# Patient Record
Sex: FEMALE | Race: WHITE | NOT HISPANIC OR LATINO | Employment: UNEMPLOYED | URBAN - NONMETROPOLITAN AREA
[De-identification: names, ages, dates, MRNs, and addresses within clinical notes are randomized per-mention and may not be internally consistent; named-entity substitution may affect disease eponyms.]

---

## 2017-01-04 ENCOUNTER — MYC MEDICAL ADVICE (OUTPATIENT)
Dept: FAMILY MEDICINE | Facility: CLINIC | Age: 47
End: 2017-01-04

## 2017-01-04 NOTE — Clinical Note
[facility]  [address] [facility] [address]  Phone: [phone]    January 10, 2017    Re: Noe Fitzgerald  [address] [address]              To Whom It May Concern:           I'm writing this letter on behalf of my patient, Noe Fitzgerald. For years she has suffered with chronic neck and back pain, headaches and deep grooves from her bra in to her shoulders. I recommend that she have breast reduction surgery done to alleviate her symptoms.       It is my pleasure to participate in Noe's health care needs. Please feel free to call if any questions or concerns.                Sincerely,            Abril Flores MD

## 2017-01-05 NOTE — TELEPHONE ENCOUNTER
Please advise   Thank you.      Notes from 11/23/16 Office Visit   Likely the phlegm in throat is from the lisinopril. Will stop and start losartan.      Omeprazole - Gastroesophageal reflux disease without esophagitis   Metoprolol - beta blocker - hypertension    Chlorthalidone - diuretic- Hypertension  Lisinopril - Ace inhibitor - Hypertension

## 2017-01-10 ENCOUNTER — TELEPHONE (OUTPATIENT)
Dept: FAMILY MEDICINE | Facility: CLINIC | Age: 47
End: 2017-01-10

## 2017-01-10 ENCOUNTER — ALLIED HEALTH/NURSE VISIT (OUTPATIENT)
Dept: FAMILY MEDICINE | Facility: CLINIC | Age: 47
End: 2017-01-10
Payer: COMMERCIAL

## 2017-01-10 ENCOUNTER — MEDICAL CORRESPONDENCE (OUTPATIENT)
Dept: HEALTH INFORMATION MANAGEMENT | Facility: CLINIC | Age: 47
End: 2017-01-10

## 2017-01-10 DIAGNOSIS — I77.810 ASCENDING AORTA DILATATION (H): Primary | ICD-10-CM

## 2017-01-10 PROCEDURE — 99207 ZZC NO CHARGE NURSE ONLY: CPT

## 2017-01-10 NOTE — PROGRESS NOTES
Patient came in to the clinic today for concerns about her medications.  Patient was confused about what she is suppose to stop and start.  Patient reports she has not started her Metoprolol.  Patient states she stopped the Lisinopril this summer and the tingling and swallowing concerns have not improved.  Patient reports the omeprazole did not improve the the swallowing concerns.  Patient stopped taking the Omeprazole. Patient stopped the chlorthalidone. Patient will start the Metoprolol.   Per notes below:    Notes from :     Abril Bello MD at 9/12/2016  4:43 PM      Status: Signed         Expand All Collapse All    BP Readings from Last 6 Encounters:    06/06/16  118/82    12/08/15  122/88    11/23/15  122/84    04/05/14  149/92    08/12/13  132/80    05/13/13  122/88        I discussed her echocardiogram with Dr Deshpande, recommend going on metoprolol, and recheck echocardiogram in one year.    Stop chlorthalidone  Start metoprolol    She is still having trouble swallowing and now it burns more, so she thinks it is heartburn.    Will have her start omeprazole and let me know           Will send to provider for review.    Thank you,  Valarie KAUR RN

## 2017-01-10 NOTE — TELEPHONE ENCOUNTER
Pt notified understood letter placed at  for pt to  per pt.  [facility]

## 2017-01-11 ENCOUNTER — OFFICE VISIT (OUTPATIENT)
Dept: FAMILY MEDICINE | Facility: CLINIC | Age: 47
End: 2017-01-11
Payer: COMMERCIAL

## 2017-01-11 VITALS
SYSTOLIC BLOOD PRESSURE: 128 MMHG | WEIGHT: 236.6 LBS | TEMPERATURE: 98.5 F | RESPIRATION RATE: 24 BRPM | HEIGHT: 66 IN | OXYGEN SATURATION: 97 % | BODY MASS INDEX: 38.02 KG/M2 | DIASTOLIC BLOOD PRESSURE: 72 MMHG | HEART RATE: 81 BPM

## 2017-01-11 DIAGNOSIS — I77.810 ASCENDING AORTA DILATATION (H): Primary | ICD-10-CM

## 2017-01-11 DIAGNOSIS — K21.9 GASTROESOPHAGEAL REFLUX DISEASE, ESOPHAGITIS PRESENCE NOT SPECIFIED: ICD-10-CM

## 2017-01-11 DIAGNOSIS — R13.10 DYSPHAGIA, UNSPECIFIED TYPE: ICD-10-CM

## 2017-01-11 DIAGNOSIS — I10 HYPERTENSION GOAL BP (BLOOD PRESSURE) < 140/90: ICD-10-CM

## 2017-01-11 PROCEDURE — 99214 OFFICE O/P EST MOD 30 MIN: CPT | Performed by: NURSE PRACTITIONER

## 2017-01-11 RX ORDER — METOPROLOL SUCCINATE 25 MG/1
25 TABLET, EXTENDED RELEASE ORAL DAILY
Qty: 30 TABLET | Refills: 3 | Status: SHIPPED | OUTPATIENT
Start: 2017-01-11 | End: 2017-02-17

## 2017-01-11 RX ORDER — LISINOPRIL 10 MG/1
10 TABLET ORAL DAILY
Qty: 90 TABLET | Refills: 3 | Status: SHIPPED | OUTPATIENT
Start: 2017-01-11 | End: 2017-02-17

## 2017-01-11 NOTE — MR AVS SNAPSHOT
After Visit Summary   1/11/2017    Noe Fitzgerald    MRN: [id number]           Patient Information     Date Of Birth          1970        Visit Information        Provider Department      1/11/2017 2:00 PM Samantha Cruz APRN [facility]        Today's Diagnoses     Ascending aorta dilatation (H)    -  1     Hypertension goal BP (blood pressure) < 140/90         Dysphagia, unspecified type         Gastroesophageal reflux disease, esophagitis presence not specified           Care Instructions    Schedule your endoscopy, phone number is below.    As the Prilosec is no longer effective, you could try Protonix 40 mg a day for a month then reduce to 20 mg a day. It's available over the counter.      Tips to Control Acid Reflux  To control acid reflux, you ll need to make some basic diet and lifestyle changes. The simple steps outlined below may be all you ll need to relieve discomfort.  Watch What You Eat      Avoid fatty foods and spicy foods.    Eat fewer acidic foods, such as citrus and tomato-based foods. These can increase symptoms.    Limit drinking alcohol, caffeine, and fizzy beverages. All increase acid reflux.    Try limiting chocolate, peppermint, and spearmint. These can worsen acid reflux in some people.  Watch When You Eat    Avoid lying down for 3 hours after eating.    Do not snack before going to bed.  Raise Your Head    Raising your head and upper body by 4 inches to 6 inches helps limit reflux when you re lying down. Put blocks under the head of the bed frame to raise it.  Other Changes    Lose weight, if you need to.    Don t work out near bedtime.    Avoid tight-fitting clothes.    Limit aspirin and ibuprofen.    Stop smoking.     2859-0559 [employer]. [address] [place] [address], [place] [address]. All rights reserved. This information is not intended as a substitute for professional medical care. Always follow your healthcare professional's  instructions.        What Is Acid Reflux?    Do you have to clear your throat or cough often? Are you hoarse? Do you have difficulty swallowing? If you have these or other throat symptoms, you may have acid reflux (when stomach acid washes up and irritates your throat).  Why You Have Throat Symptoms  At both ends of the esophagus (the tube that carries food to the stomach) are the esophageal sphincters. These muscles relax to let food pass, then tighten to keep stomach acid down. When the lower esophageal sphincter (LES) doesn t tighten enough, acid can reflux from the stomach into the esophagus. This may cause heartburn. If the upper esophageal sphincter (UES) also doesn t work well, acid can travel higher and enter your throat (pharynx). In many cases, this causes throat symptoms.  Common Throat Symptoms    Frequent need to clear your throat    Feeling like you re choking    Chronic cough    Hoarseness    Trouble swallowing    Sensation of having  a lump in the throat     Sour or acid taste    Recurrent sore throat     3960-6806 [employer]. [address] [place] [address]. All rights reserved. This information is not intended as a substitute for professional medical care. Always follow your healthcare professional's instructions.              Follow-ups after your visit        Additional Services     GASTROENTEROLOGY ADULT REFERRAL +/- PROCEDURE       Coverage of these services is subject to the terms and limitations of your health insurance plan.  Please call  member services at your health plan with any benefit or coverage questions.  Any procedures must be performed at a [facility] facility OR coordinated by your clinic's referral office.    PROCEDURE ONLY - UPPER GI ENDOSCOPY (EGD) - Reason for procedure: Chronic GERD  FMG: [facility] [place] [phone]   [email or web address]  0                  Your next 10 appointments already scheduled     [place]  23, 2017 11:00 AM   [facility] Mamm[facility] with [facility]   [facility]    [address] [address] [phone]           Do NOT use body powder, lotions, perfume or deodorant the day of the exam.      If your last mammogram was not done at [place], please bring your mammogram films. We will need the name of your provider to send a copy of your report.        A mammogram may be covered on an annual or biannual basis, please check with your insurance company.             Jan 30, 2017 11:00 AM   SHORT with Abril Bello MD   [facility]    [address] [address] [phone]              Who to contact     If you have questions or need follow up information about today's clinic visit or your schedule please contact [facility] directly at [phone].  Normal or non-critical lab and imaging results will be communicated to you by Connectbeamhart, letter or phone within 4 business days after the clinic has received the results. If you do not hear from us within 7 days, please contact the clinic through HidInImage or phone. If you have a critical or abnormal lab result, we will notify you by phone as soon as possible.  Submit refill requests through HidInImage or call your pharmacy and they will forward the refill request to us. Please allow 3 business days for your refill to be completed.          Additional Information About Your Visit        HidInImage Information     HidInImage gives you secure access to your electronic health record. If you see a primary care provider, you can also send messages to your care team and make appointments. If you have questions, please call your primary care clinic.  If you do not have a primary care provider, please call [phone] and they will assist you.        Care EveryWhere ID     This is your Care EveryWhere ID. This could be used by other organizations to  "access your [facility] medical records  [id number]        Your Vitals Were     Pulse Temperature Respirations    81 98.5  F (36.9  C) (Tympanic) 24    Height BMI (Body Mass Index) Pulse Oximetry    5' 5.75\" (1.67 m) 38.48 kg/m2 97%    Last Period Breastfeeding?       12/07/2016 (Approximate) No        Blood Pressure from Last 3 Encounters:   01/11/17 128/72   12/27/16 136/94   06/06/16 118/82    Weight from Last 3 Encounters:   01/11/17 236 lb 9.6 oz (107.321 kg)   12/27/16 240 lb 1.6 oz (108.909 kg)   06/06/16 233 lb (105.688 kg)              We Performed the Following     GASTROENTEROLOGY ADULT REFERRAL +/- PROCEDURE          Today's Medication Changes          These changes are accurate as of: 1/11/17  2:43 PM.  If you have any questions, ask your nurse or doctor.               Stop taking these medicines if you haven't already. Please contact your care team if you have questions.     chlorthalidone 25 MG tablet   Commonly known as:  HYGROTON                Where to get your medicines      These medications were sent to [facility] [address] [place] [address] - [address]  [address] [facility] [address]     Phone:  [phone]    - lisinopril 10 MG tablet  - metoprolol 25 MG 24 hr tablet             Primary Care Provider Office Phone # Fax #    Abril Bello [phone]       [facility] [address] [facility] [address]        Thank you!     Thank you for choosing [facility]  for your care. Our goal is always to provide you with excellent care. Hearing back from our patients is one way we can continue to improve our services. Please take a few minutes to complete the written survey that you may receive in the mail after your visit with us. Thank you!             Your Updated Medication List - Protect others around you: Learn how to safely use, store and throw away your medicines at www.disposemymeds.org.          This list is accurate as of: 1/11/17  " 2:43 PM.  Always use your most recent med list.                   Brand Name Dispense Instructions for use    IBU-200 PO      Take 2 tablets by mouth as needed.       lisinopril 10 MG tablet    PRINIVIL/ZESTRIL    90 tablet    Take 1 tablet (10 mg) by mouth daily       metoprolol 25 MG 24 hr tablet    TOPROL-XL    30 tablet    Take 1 tablet (25 mg) by mouth daily       omeprazole 20 MG CR capsule    priLOSEC    30 capsule    Take 1 capsule (20 mg) by mouth daily

## 2017-01-11 NOTE — PATIENT INSTRUCTIONS
Schedule your endoscopy, phone number is below.    As the Prilosec is no longer effective, you could try Protonix 40 mg a day for a month then reduce to 20 mg a day. It's available over the counter.      Tips to Control Acid Reflux  To control acid reflux, you ll need to make some basic diet and lifestyle changes. The simple steps outlined below may be all you ll need to relieve discomfort.  Watch What You Eat      Avoid fatty foods and spicy foods.    Eat fewer acidic foods, such as citrus and tomato-based foods. These can increase symptoms.    Limit drinking alcohol, caffeine, and fizzy beverages. All increase acid reflux.    Try limiting chocolate, peppermint, and spearmint. These can worsen acid reflux in some people.  Watch When You Eat    Avoid lying down for 3 hours after eating.    Do not snack before going to bed.  Raise Your Head    Raising your head and upper body by 4 inches to 6 inches helps limit reflux when you re lying down. Put blocks under the head of the bed frame to raise it.  Other Changes    Lose weight, if you need to.    Don t work out near bedtime.    Avoid tight-fitting clothes.    Limit aspirin and ibuprofen.    Stop smoking.     8815-8732 [employer]. [address] [place] [address], [place] [address]. All rights reserved. This information is not intended as a substitute for professional medical care. Always follow your healthcare professional's instructions.        What Is Acid Reflux?    Do you have to clear your throat or cough often? Are you hoarse? Do you have difficulty swallowing? If you have these or other throat symptoms, you may have acid reflux (when stomach acid washes up and irritates your throat).  Why You Have Throat Symptoms  At both ends of the esophagus (the tube that carries food to the stomach) are the esophageal sphincters. These muscles relax to let food pass, then tighten to keep stomach acid down. When the lower esophageal sphincter (LES) doesn t tighten  enough, acid can reflux from the stomach into the esophagus. This may cause heartburn. If the upper esophageal sphincter (UES) also doesn t work well, acid can travel higher and enter your throat (pharynx). In many cases, this causes throat symptoms.  Common Throat Symptoms    Frequent need to clear your throat    Feeling like you re choking    Chronic cough    Hoarseness    Trouble swallowing    Sensation of having  a lump in the throat     Sour or acid taste    Recurrent sore throat     9528-7207 [employer]. [address] [place] [address], [place] [address]. All rights reserved. This information is not intended as a substitute for professional medical care. Always follow your healthcare professional's instructions.

## 2017-01-11 NOTE — PROGRESS NOTES
SUBJECTIVE:                                                    Noe Fitzgerald is a 47 year old female who presents to clinic today for the following health issues:  GERD/Heartburn      Duration: 1 week worse, started in June    Description (location/character/radiation): heartburn, acid reflux and food getting stuck    Intensity:  severe    Accompanying signs and symptoms:  food getting stuck: YES  THROAT clearing   nausea/vomiting/blood: YES- vomiting  abdominal pain: no   black/tarry or bloody stools: no :    History (similar episodes/previous evaluation): tried going off lisinopril, not helping, otc prilosec helped for a little bit but quit working    Precipitating or alleviating factors:  worse with alcohol.  current NSAID/Aspirin use: YES- occasionally naproxyn    Therapies tried and outcome: Omeprazole (Prilosec) not helpful   Feels like Food is caught at back of throat. heartburn since June bur symptoms worsened for  about a week, .  No N&V during the day.  Prilosec helped in the past but no more. Normal diet, chews well. Some softer foods. .     Hypertension Follow-up      Outpatient blood pressures are not being checked.    Low Salt Diet: no added salt     BP Readings from Last 6 Encounters:   01/26/17 118/87   01/11/17 128/72   12/27/16 136/94   06/06/16 118/82   12/08/15 122/88   11/23/15 122/84         Amount of exercise or physical activity: 2-3 days/week for an average of 15-30 minutes    Problems taking medications regularly: No    Medication side effects: none    Diet: low salt    Problem list and histories reviewed & adjusted, as indicated.  Additional history: as documented    Labs reviewed in EPIC  Problem list, Medication list, Allergies, and Medical/Social/Surgical histories reviewed in [facility] and updated as appropriate.    ROS:  Constitutional, HEENT, cardiovascular, pulmonary, gi and gu systems are negative, except as otherwise noted.    OBJECTIVE:                                            "         /72 mmHg  Pulse 81  Temp(Src) 98.5  F (36.9  C) (Tympanic)  Resp 24  Ht 5' 5.75\" (1.67 m)  Wt 236 lb 9.6 oz (107.321 kg)  BMI 38.48 kg/m2  SpO2 97%  LMP 01/10/2017  Breastfeeding? No  Body mass index is 38.48 kg/(m^2).  GENERAL: healthy, alert and no distress  RESP: lungs clear to auscultation - no rales, rhonchi or wheezes  CV: regular rate and rhythm, normal S1 S2, no S3 or S4, no murmur, click or rub, no peripheral edema and peripheral pulses strong    Diagnostic Test Results:  none     ASSESSMENT/PLAN:                                                        1. Hypertension goal BP (blood pressure) < 140/90  Well controlled. Needs med refills. No change in plan of care   - lisinopril (PRINIVIL/ZESTRIL) 10 MG tablet; Take 1 tablet (10 mg) by mouth daily  Dispense: 90 tablet; Refill: 3  - metoprolol (TOPROL-XL) 25 MG 24 hr tablet; Take 1 tablet (25 mg) by mouth daily  Dispense: 30 tablet; Refill: 3    2. Dysphagia, unspecified type  Symptoms have worsened over the past week. Will refer for UGI  - GASTROENTEROLOGY ADULT REFERRAL +/- PROCEDURE    3. Gastroesophageal reflux disease, esophagitis presence not specified  Continue PPI. Consider changing to protonix. Further plan pending UGI      Patient Instructions     Schedule your endoscopy, phone number is below.    As the Prilosec is no longer effective, you could try Protonix 40 mg a day for a month then reduce to 20 mg a day. It's available over the counter.      Tips to Control Acid Reflux  To control acid reflux, you ll need to make some basic diet and lifestyle changes. The simple steps outlined below may be all you ll need to relieve discomfort.  Watch What You Eat      Avoid fatty foods and spicy foods.    Eat fewer acidic foods, such as citrus and tomato-based foods. These can increase symptoms.    Limit drinking alcohol, caffeine, and fizzy beverages. All increase acid reflux.    Try limiting chocolate, peppermint, and spearmint. These " can worsen acid reflux in some people.  Watch When You Eat    Avoid lying down for 3 hours after eating.    Do not snack before going to bed.  Raise Your Head    Raising your head and upper body by 4 inches to 6 inches helps limit reflux when you re lying down. Put blocks under the head of the bed frame to raise it.  Other Changes    Lose weight, if you need to.    Don t work out near bedtime.    Avoid tight-fitting clothes.    Limit aspirin and ibuprofen.    Stop smoking.     0789-2737 [employer]. [address] [place] [address]. All rights reserved. This information is not intended as a substitute for professional medical care. Always follow your healthcare professional's instructions.        What Is Acid Reflux?    Do you have to clear your throat or cough often? Are you hoarse? Do you have difficulty swallowing? If you have these or other throat symptoms, you may have acid reflux (when stomach acid washes up and irritates your throat).  Why You Have Throat Symptoms  At both ends of the esophagus (the tube that carries food to the stomach) are the esophageal sphincters. These muscles relax to let food pass, then tighten to keep stomach acid down. When the lower esophageal sphincter (LES) doesn t tighten enough, acid can reflux from the stomach into the esophagus. This may cause heartburn. If the upper esophageal sphincter (UES) also doesn t work well, acid can travel higher and enter your throat (pharynx). In many cases, this causes throat symptoms.  Common Throat Symptoms    Frequent need to clear your throat    Feeling like you re choking    Chronic cough    Hoarseness    Trouble swallowing    Sensation of having  a lump in the throat     Sour or acid taste    Recurrent sore throat     4571-8036 [employer]. [address] [place] [address]. All rights reserved. This information is not intended as a substitute for professional medical care. Always follow your  healthcare professional's instructions.              Samantha Cruz, SILKE, APRN [facility]

## 2017-01-11 NOTE — NURSING NOTE
"Chief Complaint   Patient presents with     Gastrophageal Reflux       Initial /72 mmHg  Pulse 81  Temp(Src) 98.5  F (36.9  C) (Tympanic)  Resp 24  Ht 5' 5.75\" (1.67 m)  Wt 236 lb 9.6 oz (107.321 kg)  BMI 38.48 kg/m2  SpO2 97%  LMP 12/07/2016 (Approximate)  Breastfeeding? No Estimated body mass index is 38.48 kg/(m^2) as calculated from the following:    Height as of this encounter: 5' 5.75\" (1.67 m).    Weight as of this encounter: 236 lb 9.6 oz (107.321 kg).  BP completed using cuff size: large  "

## 2017-01-23 DIAGNOSIS — Z12.31 VISIT FOR SCREENING MAMMOGRAM: ICD-10-CM

## 2017-01-23 PROCEDURE — G0202 SCR MAMMO BI INCL CAD: HCPCS | Mod: TC

## 2017-01-25 ENCOUNTER — ANESTHESIA EVENT (OUTPATIENT)
Dept: GASTROENTEROLOGY | Facility: CLINIC | Age: 47
End: 2017-01-25
Payer: COMMERCIAL

## 2017-01-25 NOTE — ANESTHESIA PREPROCEDURE EVALUATION
Anesthesia Evaluation     . Pt has had prior anesthetic. Type: General and MAC      ROS/MED HX    ENT/Pulmonary:  - neg pulmonary ROS     Neurologic:  - neg neurologic ROS     Cardiovascular:     (+) Dyslipidemia, hypertension----. : . . . :. .       METS/Exercise Tolerance:     Hematologic:  - neg hematologic  ROS       Musculoskeletal:  - neg musculoskeletal ROS       GI/Hepatic:     (+) GERD Symptomatic,       Renal/Genitourinary:  - ROS Renal section negative       Endo:     (+) Obesity, .      Psychiatric:  - neg psychiatric ROS       Infectious Disease:  - neg infectious disease ROS       Malignancy:      - no malignancy   Other:               Physical Exam  Normal systems: cardiovascular, pulmonary and dental    Airway   Mallampati: II  TM distance: >3 FB  Neck ROM: full    Dental     Cardiovascular       Pulmonary     Other findings: Dilated Ascending Aorta                Anesthesia Plan      History & Physical Review  History and physical reviewed and following examination; no interval change.    ASA Status:  2 .    NPO Status:  > 6 hours    Plan for MAC          Postoperative Care      Consents  Anesthetic plan, risks, benefits and alternatives discussed with:  Patient..                          .

## 2017-01-26 ENCOUNTER — ANESTHESIA (OUTPATIENT)
Dept: GASTROENTEROLOGY | Facility: CLINIC | Age: 47
End: 2017-01-26
Payer: COMMERCIAL

## 2017-01-26 ENCOUNTER — HOSPITAL ENCOUNTER (OUTPATIENT)
Facility: CLINIC | Age: 47
Discharge: HOME | End: 2017-01-26
Attending: SURGERY | Admitting: SURGERY
Payer: COMMERCIAL

## 2017-01-26 ENCOUNTER — SURGERY (OUTPATIENT)
Age: 47
End: 2017-01-26

## 2017-01-26 VITALS
OXYGEN SATURATION: 97 % | SYSTOLIC BLOOD PRESSURE: 118 MMHG | DIASTOLIC BLOOD PRESSURE: 87 MMHG | HEART RATE: 76 BPM | TEMPERATURE: 97.1 F | RESPIRATION RATE: 20 BRPM

## 2017-01-26 LAB
HCG UR QL: NEGATIVE
UPPER GI ENDOSCOPY: NORMAL

## 2017-01-26 PROCEDURE — 43235 EGD DIAGNOSTIC BRUSH WASH: CPT | Performed by: SURGERY

## 2017-01-26 PROCEDURE — 37000008 ZZH ANESTHESIA TECHNICAL FEE, 1ST 30 MIN: Performed by: SURGERY

## 2017-01-26 PROCEDURE — 25800025 ZZH RX 258: Performed by: SURGERY

## 2017-01-26 PROCEDURE — 25000125 ZZHC RX 250: Performed by: SURGERY

## 2017-01-26 PROCEDURE — 25000125 ZZHC RX 250: Performed by: NURSE ANESTHETIST, CERTIFIED REGISTERED

## 2017-01-26 PROCEDURE — 25000132 ZZH RX MED GY IP 250 OP 250 PS 637: Performed by: SURGERY

## 2017-01-26 PROCEDURE — 81025 URINE PREGNANCY TEST: CPT | Performed by: SURGERY

## 2017-01-26 RX ORDER — SODIUM CHLORIDE, SODIUM LACTATE, POTASSIUM CHLORIDE, CALCIUM CHLORIDE 600; 310; 30; 20 MG/100ML; MG/100ML; MG/100ML; MG/100ML
INJECTION, SOLUTION INTRAVENOUS CONTINUOUS
Status: DISCONTINUED | OUTPATIENT
Start: 2017-01-26 | End: 2017-01-26 | Stop reason: HOSPADM

## 2017-01-26 RX ORDER — PROPOFOL 10 MG/ML
INJECTION, EMULSION INTRAVENOUS PRN
Status: DISCONTINUED | OUTPATIENT
Start: 2017-01-26 | End: 2017-01-26

## 2017-01-26 RX ORDER — CEFAZOLIN SODIUM 2 G/100ML
2 INJECTION, SOLUTION INTRAVENOUS
Status: DISCONTINUED | OUTPATIENT
Start: 2017-01-26 | End: 2017-01-26 | Stop reason: HOSPADM

## 2017-01-26 RX ORDER — LIDOCAINE HYDROCHLORIDE 10 MG/ML
INJECTION, SOLUTION INFILTRATION; PERINEURAL PRN
Status: DISCONTINUED | OUTPATIENT
Start: 2017-01-26 | End: 2017-01-26

## 2017-01-26 RX ORDER — GLYCOPYRROLATE 0.2 MG/ML
INJECTION, SOLUTION INTRAMUSCULAR; INTRAVENOUS PRN
Status: DISCONTINUED | OUTPATIENT
Start: 2017-01-26 | End: 2017-01-26

## 2017-01-26 RX ORDER — SODIUM CHLORIDE, SODIUM LACTATE, POTASSIUM CHLORIDE, CALCIUM CHLORIDE 600; 310; 30; 20 MG/100ML; MG/100ML; MG/100ML; MG/100ML
1000 INJECTION, SOLUTION INTRAVENOUS CONTINUOUS
Status: DISCONTINUED | OUTPATIENT
Start: 2017-01-26 | End: 2017-01-26 | Stop reason: HOSPADM

## 2017-01-26 RX ORDER — LIDOCAINE 40 MG/G
CREAM TOPICAL
Status: DISCONTINUED | OUTPATIENT
Start: 2017-01-26 | End: 2017-01-26 | Stop reason: HOSPADM

## 2017-01-26 RX ORDER — ONDANSETRON 2 MG/ML
4 INJECTION INTRAMUSCULAR; INTRAVENOUS
Status: DISCONTINUED | OUTPATIENT
Start: 2017-01-26 | End: 2017-01-26 | Stop reason: HOSPADM

## 2017-01-26 RX ORDER — SIMETHICONE 40MG/0.6ML
SUSPENSION, DROPS(FINAL DOSAGE FORM)(ML) ORAL PRN
Status: DISCONTINUED | OUTPATIENT
Start: 2017-01-26 | End: 2017-01-26 | Stop reason: HOSPADM

## 2017-01-26 RX ORDER — PROPOFOL 10 MG/ML
INJECTION, EMULSION INTRAVENOUS CONTINUOUS PRN
Status: DISCONTINUED | OUTPATIENT
Start: 2017-01-26 | End: 2017-01-26

## 2017-01-26 RX ADMIN — PROPOFOL 200 MG: 10 INJECTION, EMULSION INTRAVENOUS at 12:48

## 2017-01-26 RX ADMIN — LIDOCAINE HYDROCHLORIDE 50 MG: 10 INJECTION, SOLUTION INFILTRATION; PERINEURAL at 12:48

## 2017-01-26 RX ADMIN — PROPOFOL 200 MCG/KG/MIN: 10 INJECTION, EMULSION INTRAVENOUS at 12:48

## 2017-01-26 RX ADMIN — BENZOCAINE 1 SPRAY: 220 SPRAY, METERED PERIODONTAL at 12:47

## 2017-01-26 RX ADMIN — SODIUM CHLORIDE, POTASSIUM CHLORIDE, SODIUM LACTATE AND CALCIUM CHLORIDE: 600; 310; 30; 20 INJECTION, SOLUTION INTRAVENOUS at 12:23

## 2017-01-26 RX ADMIN — LIDOCAINE HYDROCHLORIDE 1 ML: 10 INJECTION, SOLUTION INFILTRATION; PERINEURAL at 12:24

## 2017-01-26 RX ADMIN — GLYCOPYRROLATE 0.2 MG: 0.2 INJECTION, SOLUTION INTRAMUSCULAR; INTRAVENOUS at 12:46

## 2017-01-26 RX ADMIN — SIMETHICONE 40 MG: 20 SUSPENSION/ DROPS ORAL at 12:47

## 2017-01-26 NOTE — ANESTHESIA CARE TRANSFER NOTE
Patient: Noe Fitzgerald    COMBINED ESOPHAGOSCOPY, GASTROSCOPY, DUODENOSCOPY (EGD) (N/A Esophagus)  Additional InformationProcedure(s):  Gastroscopy - Wound Class: II-Clean Contaminated    Diagnosis: dysphagia  Diagnosis Additional Information: No value filed.    Anesthesia Type:   MAC     Note:  Airway :Room Air  Patient transferred to:Phase II        Vitals: (Last set prior to Anesthesia Care Transfer)              Electronically Signed By: GEETHA Victoria CRNA  January 26, 2017  1:02 PM

## 2017-01-26 NOTE — ANESTHESIA POSTPROCEDURE EVALUATION
Patient: Noe Fitzgerald    COMBINED ESOPHAGOSCOPY, GASTROSCOPY, DUODENOSCOPY (EGD) (N/A Esophagus)  Additional InformationProcedure(s):  Gastroscopy - Wound Class: II-Clean Contaminated    Diagnosis:dysphagia  Diagnosis Additional Information: No value filed.    Anesthesia Type:  MAC    Note:  Anesthesia Post Evaluation    Patient location during evaluation: Phase 2  Patient participation: Able to fully participate in evaluation  Level of consciousness: awake  Pain management: adequate  Airway patency: patent  Cardiovascular status: acceptable  Respiratory status: acceptable  Hydration status: acceptable  PONV: none     Anesthetic complications: None          Last vitals:  Filed Vitals:    01/26/17 1203   BP: 184/110   Temp: 36  C (96.8  F)   Resp: 20       Electronically Signed By: GEETHA Victoria CRNA  January 26, 2017  1:02 PM

## 2017-01-26 NOTE — H&P
47 year old year old female here for upper endoscopy for dysphagia        Patient Active Problem List   Diagnosis     Large breasts     CARDIOVASCULAR SCREENING; LDL GOAL LESS THAN 160     Hypertension goal BP (blood pressure) < 140/90     ASCUS with positive high risk HPV     Ascending aorta dilatation (H)       Past Medical History   Diagnosis Date     Vaginitis and vulvovaginitis, unspecified      Supervision of normal first pregnancy      Gynecological examination      S/P LEEP of cervix 1991     has been >20 yrs since LEEP, no abnls noted, paps moved to Q3 yrs     ASCUS with positive high risk HPV 11/2015       Past Surgical History   Procedure Laterality Date     Surgical history of -        Ear surgery     Surgical history of -        Foot surgery     Leep tx, cervical  1991     LEEP TX Cervical       Family History   Problem Relation Age of Onset     Hypertension Father        No current outpatient prescriptions on file.    Allergies   Allergen Reactions     Bactrim [Sulfamethoxazole W/Trimethoprim]        Pt reports that she has never smoked. She has never used smokeless tobacco. She reports that she does not drink alcohol or use illicit drugs.    Exam:    Awake, Alert OX3  Lungs - CTA bilaterally  CV - RRR, no murmurs, distal pulses intact  Abd - soft, non-distended, non-tender, +BS  Extr - No cyanosis or edema    A/P 47 year old year old female in need of upper endoscopy for dysphagia. Risks, benefits, alternatives, and complications were discussed including the possibility of perforation and the patient agreed to proceed.    Damian Ames MD

## 2017-02-17 ENCOUNTER — OFFICE VISIT (OUTPATIENT)
Dept: FAMILY MEDICINE | Facility: CLINIC | Age: 47
End: 2017-02-17
Payer: COMMERCIAL

## 2017-02-17 VITALS
BODY MASS INDEX: 37.93 KG/M2 | OXYGEN SATURATION: 98 % | SYSTOLIC BLOOD PRESSURE: 122 MMHG | HEART RATE: 102 BPM | DIASTOLIC BLOOD PRESSURE: 84 MMHG | WEIGHT: 236 LBS | TEMPERATURE: 99.3 F | HEIGHT: 66 IN

## 2017-02-17 DIAGNOSIS — Z00.00 ROUTINE GENERAL MEDICAL EXAMINATION AT A HEALTH CARE FACILITY: Primary | ICD-10-CM

## 2017-02-17 DIAGNOSIS — K21.9 GASTROESOPHAGEAL REFLUX DISEASE WITHOUT ESOPHAGITIS: ICD-10-CM

## 2017-02-17 DIAGNOSIS — I77.810 ASCENDING AORTA DILATATION (H): ICD-10-CM

## 2017-02-17 DIAGNOSIS — R87.610 PAPANICOLAOU SMEAR OF CERVIX WITH ATYPICAL SQUAMOUS CELLS OF UNDETERMINED SIGNIFICANCE (ASC-US): ICD-10-CM

## 2017-02-17 DIAGNOSIS — R21 RASH AND NONSPECIFIC SKIN ERUPTION: ICD-10-CM

## 2017-02-17 DIAGNOSIS — N62 LARGE BREASTS: ICD-10-CM

## 2017-02-17 DIAGNOSIS — I10 HYPERTENSION GOAL BP (BLOOD PRESSURE) < 140/90: ICD-10-CM

## 2017-02-17 PROCEDURE — 99396 PREV VISIT EST AGE 40-64: CPT | Performed by: FAMILY MEDICINE

## 2017-02-17 PROCEDURE — 99212 OFFICE O/P EST SF 10 MIN: CPT | Mod: 25 | Performed by: FAMILY MEDICINE

## 2017-02-17 PROCEDURE — G0145 SCR C/V CYTO,THINLAYER,RESCR: HCPCS | Performed by: FAMILY MEDICINE

## 2017-02-17 PROCEDURE — 87624 HPV HI-RISK TYP POOLED RSLT: CPT | Performed by: FAMILY MEDICINE

## 2017-02-17 PROCEDURE — 36415 COLL VENOUS BLD VENIPUNCTURE: CPT | Performed by: FAMILY MEDICINE

## 2017-02-17 PROCEDURE — 80053 COMPREHEN METABOLIC PANEL: CPT | Performed by: FAMILY MEDICINE

## 2017-02-17 RX ORDER — METOPROLOL SUCCINATE 25 MG/1
25 TABLET, EXTENDED RELEASE ORAL DAILY
Qty: 90 TABLET | Refills: 3 | Status: SHIPPED | OUTPATIENT
Start: 2017-02-17 | End: 2018-02-02

## 2017-02-17 RX ORDER — NYSTATIN 100000 [USP'U]/G
POWDER TOPICAL 2 TIMES DAILY PRN
Qty: 60 G | Refills: 1 | Status: SHIPPED | OUTPATIENT
Start: 2017-02-17 | End: 2017-05-01

## 2017-02-17 RX ORDER — LISINOPRIL 10 MG/1
10 TABLET ORAL DAILY
Qty: 90 TABLET | Refills: 3 | Status: SHIPPED | OUTPATIENT
Start: 2017-02-17 | End: 2017-05-18

## 2017-02-17 NOTE — PROGRESS NOTES
SUBJECTIVE:     CC: Noe Fitzgerald is an 47 year old woman who presents for preventive health visit.     Healthy Habits:    Do you get at least three servings of calcium containing foods daily (dairy, green leafy vegetables, etc.)? yes    Amount of exercise or daily activities, outside of work: 0 day(s) per week    Problems taking medications regularly No    Medication side effects: No    Have you had an eye exam in the past two years? no    Do you see a dentist twice per year? yes  Do you have sleep apnea, excessive snoring or daytime drowsiness?no    Back/neck pain-  She was denied for breast reduction surgery because she did not have skin complications. She states though that she does get moisture under her breasts. She has additional paperwork to fill out to get insurance to cover it. She is still having neck and back pain. She currently wears a 42DD bra.She has tried chiropractic care, ibuprofen and tylenol with no relief.    HTN-lisinopril, metoprolol  Has no chest pain or shortness of breath   BP Readings from Last 6 Encounters:   02/17/17 122/84   01/26/17 118/87   01/11/17 128/72   12/27/16 (!) 136/94   06/06/16 118/82   12/08/15 122/88       Ascending aorta dilatation-metoprolol  Echo in September 9/7/16- ascending aorta is mildly dilated. I had spoken to cardiology who recommended metoprolol and repeat echocardiogram in one year.    Reflux- omeprazole  She has not been waking up with reflux pain since she has started the omeprazole.    Today's PHQ-2 Score:   PHQ-2 ( 1999 Pf[employer]) 1/11/2017 2/21/2013   Q1: Little interest or pleasure in doing things 1 0   Q2: Feeling down, depressed or hopeless 1 0   PHQ-2 Score 2 0       Abuse: Current or Past(Physical, Sexual or Emotional)- No  Do you feel safe in your environment - Yes    Social History   Substance Use Topics     Smoking status: Never Smoker     Smokeless tobacco: Never Used     Alcohol use No      Comment: rarely     The patient does not drink >3  "drinks per day nor >7 drinks per week.    Recent Labs   Lab Test  02/25/11   1611   CHOL  220*   HDL  40*   LDL  147*   TRIG  167*   CHOLHDLRATIO  5.0       Reviewed orders with patient.  Reviewed health maintenance and updated orders accordingly - Yes    Mammo Decision Support:  Patient under age 50, mutual decision reflected in health maintenance.      Pertinent mammograms are reviewed under the imaging tab.  History of abnormal Pap smear:     Last 3 Pap Results:   PAP (no units)   Date Value   11/23/2015 ASC-US (A)   02/21/2013 NIL   02/25/2011 NIL   High Risk Other type.    All Histories reviewed and updated in Epic.    ROS:  C: NEGATIVE for fever, chills, change in weight  I: NEGATIVE for worrisome rashes, moles or lesions  E: NEGATIVE for vision changes or irritation  ENT: NEGATIVE for ear, mouth and throat problems  R: NEGATIVE for significant cough or SOB  B: NEGATIVE for masses, tenderness or discharge  CV: NEGATIVE for chest pain, palpitations or peripheral edema  GI: NEGATIVE for nausea, abdominal pain, heartburn, or change in bowel habits  : NEGATIVE for unusual urinary or vaginal symptoms.  M: POSITIVE for neck and back pain.     BP Readings from Last 3 Encounters:   02/17/17 122/84   01/26/17 118/87   01/11/17 128/72    Wt Readings from Last 3 Encounters:   02/17/17 236 lb (107 kg)   01/11/17 236 lb 9.6 oz (107.3 kg)   12/27/16 240 lb 1.6 oz (108.9 kg)            OBJECTIVE:     /84 (BP Location: Right arm, Patient Position: Chair, Cuff Size: Adult Large)  Pulse 102  Temp 99.3  F (37.4  C) (Tympanic)  Ht 5' 6\" (1.676 m)  Wt 236 lb (107 kg)  LMP 02/10/2017  SpO2 98%  BMI 38.09 kg/m2  EXAM:  GENERAL: healthy, alert and no distress  HENT: nose and mouth without ulcers or lesions. Sclerosis and post-op changes in right TM membrane, Left is normal.   NECK: no adenopathy, no asymmetry, masses, or scars and thyroid normal to palpation  RESP: lungs clear to auscultation - no rales, rhonchi or " wheezes  BREAST: very large breasts, without masses, tenderness or nipple discharge and no palpable axillary masses or adenopathy. Moist under breasts with some slightly pink skin  CV: regular rate and rhythm, normal S1 S2, no S3 or S4, no murmur, click or rub,   ABDOMEN: soft, nontender, no hepatosplenomegaly, no masses. Limited by body habitus.   (female): normal female external genitalia, normal urethral meatus, vaginal mucosa, normal cervix/adnexa/uterus discharge. At approximately 1 o'clock erythematous whitish cyst consistent with nabothian cyst. PAP and HPV taken.  MS: no gross musculoskeletal defects noted, no edema  SKIN: no suspicious lesions or rashes    ASSESSMENT/PLAN:     Noe was seen today for physical.    Diagnoses and all orders for this visit:    Routine general medical examination at a health care facility  -     Pap imaged thin layer screen with HPV - recommended age 30 - 65 years (select HPV order below)  -     HPV High Risk Types DNA Cervical    Hypertension goal BP (blood pressure) < 140/90  -     lisinopril (PRINIVIL/ZESTRIL) 10 MG tablet; Take 1 tablet (10 mg) by mouth daily  -     Comprehensive metabolic panel (BMP + Alb, Alk Phos, ALT, AST, Total. Bili, TP)    Ascending aorta dilatation (H)  -     metoprolol (TOPROL-XL) 25 MG 24 hr tablet; Take 1 tablet (25 mg) by mouth daily    Gastroesophageal reflux disease without esophagitis  -     omeprazole (PRILOSEC) 20 MG CR capsule; Take 1 capsule (20 mg) by mouth daily    Papanicolaou smear of cervix with atypical squamous cells of undetermined significance (ASC-US)    Rash and nonspecific skin eruption  -     nystatin (MYCOSTATIN) 221987 UNIT/GM POWD; Apply topically 2 times daily as needed    Large breasts    Will try nystatin to prevent rashes and yeast under breasts.  Letter written on patients behalf      Patient Instructions     Preventive Health Recommendations  Female Ages 40 to 49    Yearly exam:     See your health care provider  "every year in order to  1. Review health changes.   2. Discuss preventive care.    3. Review your medicines if your doctor prescribed any.      Get a Pap test every three years (unless you have an abnormal result and your provider advises testing more often).      If you get Pap tests with HPV test, you only need to test every 5 years, unless you have an abnormal result. You do not need a Pap test if your uterus was removed (hysterectomy) and you have not had cancer.      You should be tested each year for STDs (sexually transmitted diseases), if you're at risk.       Ask your doctor if you should have a mammogram.      Have a colonoscopy (test for colon cancer) if someone in your family has had colon cancer or polyps before age 50.       Have a cholesterol test every 5 years.       Have a diabetes test (fasting glucose) after age 45. If you are at risk for diabetes, you should have this test every 3 years.    Shots: Get a flu shot each year. Get a tetanus shot every 10 years.     Nutrition:     Eat at least 5 servings of fruits and vegetables each day.    Eat whole-grain bread, whole-wheat pasta and brown rice instead of white grains and rice.    Talk to your provider about Calcium and Vitamin D.     Lifestyle    Exercise at least 150 minutes a week (an average of 30 minutes a day, 5 days a week). This will help you control your weight and prevent disease.    Limit alcohol to one drink per day.    No smoking.     Wear sunscreen to prevent skin cancer.    See your dentist every six months for an exam and cleaning.        COUNSELING:   Reviewed preventive health counseling, as reflected in patient instructions   reports that she has never smoked. She has never used smokeless tobacco.  Estimated body mass index is 38.09 kg/(m^2) as calculated from the following:    Height as of this encounter: 5' 6\" (1.676 m).    Weight as of this encounter: 236 lb (107 kg).   Weight management plan: Discussed healthy diet and " exercise guidelines and patient will follow up in 12 months in clinic to re-evaluate.    Counseling Resources:  ATP IV Guidelines  Pooled Cohorts Equation Calculator  Breast Cancer Risk Calculator  FRAX Risk Assessment  ICSI Preventive Guidelines  Dietary Guidelines for Americans, 2010  USDA's MyPlate  ASA Prophylaxis  Lung CA Screening    This document serves as a record of the services and decisions personally performed and made by Abril Bello MD. It was created on her behalf by Adela Bae, a trained medical scribe. The creation of this document is based the provider's statements to the medical scribe.  Adela Bae 1:27 PM 2/17/2017    Provider:   The information in this document, created by the medical scribe for me, accurately reflects the services I personally performed and the decisions made by me. I have reviewed and approved this document for accuracy prior to leaving the patient care area.  Abril Bello MD 1:27 PM 2/17/2017    Abril Bello MD  [facility]

## 2017-02-17 NOTE — LETTER
[facility]  [address]  [address]  Phone: [phone]    02/17/17    Re: Noe Fitzgerald  [address] [address]      To Whom It May Concern:     I'm writing this letter on behalf of my patient, Noe Fitzgerald. For years she has suffered with chronic neck and back pain, headaches and deep grooves from her bra into her shoulders resulting from the excessive weight of her breasts. She has tried chiropractic care, Aspirin, NSAIDS such as ibuprofen, and acetaminophen for many years with no relief. She also is experiencing significant moisture under her breasts and has nystatin powder to prevent any rashes or yeast infections.  I recommend that she have breast reduction surgery done to alleviate her symptoms and prevent future further complications.      It is my pleasure to participate in Noe's health care needs. Please feel free to call if any questions or concerns.     Sincerely,        Abril Bello MD

## 2017-02-17 NOTE — MR AVS SNAPSHOT
After Visit Summary   2/17/2017    Noe Fitzgerald    MRN: [id number]           Patient Information     Date Of Birth          1970        Visit Information        Provider Department      2/17/2017 1:00 PM Abril Bello MD [facility]        Today's Diagnoses     Routine general medical examination at a health care facility    -  1    Hypertension goal BP (blood pressure) < 140/90        Ascending aorta dilatation (H)        Gastroesophageal reflux disease without esophagitis        Papanicolaou smear of cervix with atypical squamous cells of undetermined significance (ASC-US)        Rash and nonspecific skin eruption        Large breasts          Care Instructions      Preventive Health Recommendations  Female Ages 40 to 49    Yearly exam:     See your health care provider every year in order to  1. Review health changes.   2. Discuss preventive care.    3. Review your medicines if your doctor prescribed any.      Get a Pap test every three years (unless you have an abnormal result and your provider advises testing more often).      If you get Pap tests with HPV test, you only need to test every 5 years, unless you have an abnormal result. You do not need a Pap test if your uterus was removed (hysterectomy) and you have not had cancer.      You should be tested each year for STDs (sexually transmitted diseases), if you're at risk.       Ask your doctor if you should have a mammogram.      Have a colonoscopy (test for colon cancer) if someone in your family has had colon cancer or polyps before age 50.       Have a cholesterol test every 5 years.       Have a diabetes test (fasting glucose) after age 45. If you are at risk for diabetes, you should have this test every 3 years.    Shots: Get a flu shot each year. Get a tetanus shot every 10 years.     Nutrition:     Eat at least 5 servings of fruits and vegetables each day.    Eat whole-grain bread, whole-wheat pasta and  "brown rice instead of white grains and rice.    Talk to your provider about Calcium and Vitamin D.     Lifestyle    Exercise at least 150 minutes a week (an average of 30 minutes a day, 5 days a week). This will help you control your weight and prevent disease.    Limit alcohol to one drink per day.    No smoking.     Wear sunscreen to prevent skin cancer.    See your dentist every six months for an exam and cleaning.        Follow-ups after your visit        Who to contact     If you have questions or need follow up information about today's clinic visit or your schedule please contact [facility] directly at [phone].  Normal or non-critical lab and imaging results will be communicated to you by [employer], letter or phone within 4 business days after the clinic has received the results. If you do not hear from us within 7 days, please contact the clinic through [employer] or phone. If you have a critical or abnormal lab result, we will notify you by phone as soon as possible.  Submit refill requests through [employer] or call your pharmacy and they will forward the refill request to us. Please allow 3 business days for your refill to be completed.          Additional Information About Your Visit        [employer] Information     [employer] gives you secure access to your electronic health record. If you see a primary care provider, you can also send messages to your care team and make appointments. If you have questions, please call your primary care clinic.  If you do not have a primary care provider, please call [phone] and they will assist you.        Care EveryWhere ID     This is your Care EveryWhere ID. This could be used by other organizations to access your [place] medical records  [id number]        Your Vitals Were     Pulse Temperature Height Last Period Pulse Oximetry BMI (Body Mass Index)    102 99.3  F (37.4  C) (Tympanic) 5' 6\" (1.676 m) 02/10/2017 98% 38.09 kg/m2       Blood Pressure " from Last 3 Encounters:   02/17/17 122/84   01/26/17 118/87   01/11/17 128/72    Weight from Last 3 Encounters:   02/17/17 236 lb (107 kg)   01/11/17 236 lb 9.6 oz (107.3 kg)   12/27/16 240 lb 1.6 oz (108.9 kg)              We Performed the Following     Comprehensive metabolic panel (BMP + Alb, Alk Phos, ALT, AST, Total. Bili, TP)     HPV High Risk Types DNA Cervical     OFFICE/OUTPT VISIT,EST,LEVL II     Pap imaged thin layer screen with HPV - recommended age 30 - 65 years (select HPV order below)          Today's Medication Changes          These changes are accurate as of: 2/17/17 11:59 PM.  If you have any questions, ask your nurse or doctor.               Start taking these medicines.        Dose/Directions    nystatin 337560 UNIT/GM Powd   Commonly known as:  MYCOSTATIN   Used for:  Rash and nonspecific skin eruption   Started by:  Abril Bello MD        Apply topically 2 times daily as needed   Quantity:  60 g   Refills:  1            Where to get your medicines      These medications were sent to [facility] [address] [place] [address] [address]  [address] [facility] [address]     Phone:  [phone]     lisinopril 10 MG tablet    metoprolol 25 MG 24 hr tablet    nystatin 813824 UNIT/GM Powd    omeprazole 20 MG CR capsule                Primary Care Provider Office Phone # Fax #    Abril Bello [phone]       [facility] [address] [facility] [address]        Thank you!     Thank you for choosing [facility]  for your care. Our goal is always to provide you with excellent care. Hearing back from our patients is one way we can continue to improve our services. Please take a few minutes to complete the written survey that you may receive in the mail after your visit with us. Thank you!             Your Updated Medication List - Protect others around you: Learn how to safely use, store and throw away your medicines at  www.disposemymeds.org.          This list is accurate as of: 2/17/17 11:59 PM.  Always use your most recent med list.                   Brand Name Dispense Instructions for use    IBU-200 PO      Take 2 tablets by mouth as needed.       lisinopril 10 MG tablet    PRINIVIL/ZESTRIL    90 tablet    Take 1 tablet (10 mg) by mouth daily       metoprolol 25 MG 24 hr tablet    TOPROL-XL    90 tablet    Take 1 tablet (25 mg) by mouth daily       nystatin [id number] UNIT/GM Powd    MYCOSTATIN    60 g    Apply topically 2 times daily as needed       omeprazole 20 MG CR capsule    priLOSEC    90 capsule    Take 1 capsule (20 mg) by mouth daily

## 2017-02-17 NOTE — NURSING NOTE
"Chief Complaint   Patient presents with     Physical     yearly       Initial /84 (BP Location: Right arm, Patient Position: Chair, Cuff Size: Adult Large)  Pulse 102  Temp 99.3  F (37.4  C) (Tympanic)  Ht 5' 6\" (1.676 m)  Wt 236 lb (107 kg)  LMP 02/10/2017  SpO2 98%  BMI 38.09 kg/m2 Estimated body mass index is 38.09 kg/(m^2) as calculated from the following:    Height as of this encounter: 5' 6\" (1.676 m).    Weight as of this encounter: 236 lb (107 kg).  Medication Reconciliation: complete    Health Maintenance that is potentially due pending provider review:  Pap Smear    n/a    "

## 2017-02-18 LAB
ALBUMIN SERPL-MCNC: 3.7 G/DL (ref 3.4–5)
ALP SERPL-CCNC: 54 U/L (ref 40–150)
ALT SERPL W P-5'-P-CCNC: 22 U/L (ref 0–50)
ANION GAP SERPL CALCULATED.3IONS-SCNC: 7 MMOL/L (ref 3–14)
AST SERPL W P-5'-P-CCNC: 12 U/L (ref 0–45)
BILIRUB SERPL-MCNC: 0.3 MG/DL (ref 0.2–1.3)
BUN SERPL-MCNC: 11 MG/DL (ref 7–30)
CALCIUM SERPL-MCNC: 8.2 MG/DL (ref 8.5–10.1)
CHLORIDE SERPL-SCNC: 109 MMOL/L (ref 94–109)
CO2 SERPL-SCNC: 25 MMOL/L (ref 20–32)
CREAT SERPL-MCNC: 0.84 MG/DL (ref 0.52–1.04)
GFR SERPL CREATININE-BSD FRML MDRD: 72 ML/MIN/1.7M2
GLUCOSE SERPL-MCNC: 99 MG/DL (ref 70–99)
POTASSIUM SERPL-SCNC: 3.9 MMOL/L (ref 3.4–5.3)
PROT SERPL-MCNC: 7.5 G/DL (ref 6.8–8.8)
SODIUM SERPL-SCNC: 141 MMOL/L (ref 133–144)

## 2017-02-21 LAB
COPATH REPORT: NORMAL
PAP: NORMAL

## 2017-02-22 LAB
FINAL DIAGNOSIS: NORMAL
HPV HR 12 DNA CVX QL NAA+PROBE: NEGATIVE
HPV16 DNA SPEC QL NAA+PROBE: NEGATIVE
HPV18 DNA SPEC QL NAA+PROBE: NEGATIVE
SPECIMEN DESCRIPTION: NORMAL

## 2017-03-09 DIAGNOSIS — N62 HYPERTROPHY OF BREAST: Primary | ICD-10-CM

## 2017-05-01 ENCOUNTER — OFFICE VISIT (OUTPATIENT)
Dept: FAMILY MEDICINE | Facility: CLINIC | Age: 47
End: 2017-05-01
Payer: COMMERCIAL

## 2017-05-01 VITALS
OXYGEN SATURATION: 97 % | DIASTOLIC BLOOD PRESSURE: 100 MMHG | WEIGHT: 237 LBS | BODY MASS INDEX: 38.25 KG/M2 | SYSTOLIC BLOOD PRESSURE: 150 MMHG | TEMPERATURE: 98.6 F | HEART RATE: 84 BPM

## 2017-05-01 DIAGNOSIS — I10 HYPERTENSION GOAL BP (BLOOD PRESSURE) < 140/90: ICD-10-CM

## 2017-05-01 DIAGNOSIS — N62 LARGE BREASTS: ICD-10-CM

## 2017-05-01 DIAGNOSIS — Z01.818 PREOP GENERAL PHYSICAL EXAM: Primary | ICD-10-CM

## 2017-05-01 LAB
ANION GAP SERPL CALCULATED.3IONS-SCNC: 8 MMOL/L (ref 3–14)
BETA HCG QUAL IFA URINE: NEGATIVE
BUN SERPL-MCNC: 10 MG/DL (ref 7–30)
CALCIUM SERPL-MCNC: 8.6 MG/DL (ref 8.5–10.1)
CHLORIDE SERPL-SCNC: 105 MMOL/L (ref 94–109)
CO2 SERPL-SCNC: 24 MMOL/L (ref 20–32)
CREAT SERPL-MCNC: 0.88 MG/DL (ref 0.52–1.04)
ERYTHROCYTE [DISTWIDTH] IN BLOOD BY AUTOMATED COUNT: 12.9 % (ref 10–15)
GFR SERPL CREATININE-BSD FRML MDRD: 68 ML/MIN/1.7M2
GLUCOSE SERPL-MCNC: 93 MG/DL (ref 70–99)
HCT VFR BLD AUTO: 38.8 % (ref 35–47)
HGB BLD-MCNC: 13.2 G/DL (ref 11.7–15.7)
MCH RBC QN AUTO: 30 PG (ref 26.5–33)
MCHC RBC AUTO-ENTMCNC: 34 G/DL (ref 31.5–36.5)
MCV RBC AUTO: 88 FL (ref 78–100)
PLATELET # BLD AUTO: 216 10E9/L (ref 150–450)
POTASSIUM SERPL-SCNC: 4 MMOL/L (ref 3.4–5.3)
RBC # BLD AUTO: 4.4 10E12/L (ref 3.8–5.2)
SODIUM SERPL-SCNC: 137 MMOL/L (ref 133–144)
WBC # BLD AUTO: 4.8 10E9/L (ref 4–11)

## 2017-05-01 PROCEDURE — 80048 BASIC METABOLIC PNL TOTAL CA: CPT | Performed by: FAMILY MEDICINE

## 2017-05-01 PROCEDURE — 93000 ELECTROCARDIOGRAM COMPLETE: CPT | Performed by: FAMILY MEDICINE

## 2017-05-01 PROCEDURE — 36415 COLL VENOUS BLD VENIPUNCTURE: CPT | Performed by: FAMILY MEDICINE

## 2017-05-01 PROCEDURE — 99214 OFFICE O/P EST MOD 30 MIN: CPT | Performed by: FAMILY MEDICINE

## 2017-05-01 PROCEDURE — 85027 COMPLETE CBC AUTOMATED: CPT | Performed by: FAMILY MEDICINE

## 2017-05-01 PROCEDURE — 84703 CHORIONIC GONADOTROPIN ASSAY: CPT | Performed by: FAMILY MEDICINE

## 2017-05-01 NOTE — NURSING NOTE
"Chief Complaint   Patient presents with     Pre-Op Exam     Mammoplasty Reduction Bilateral       Initial BP (!) 139/100 (BP Location: Right arm)  Pulse 84  Temp 98.6  F (37  C) (Tympanic)  Wt 237 lb (107.5 kg)  LMP 04/24/2017  SpO2 97%  BMI 38.25 kg/m2 Estimated body mass index is 38.25 kg/(m^2) as calculated from the following:    Height as of 2/17/17: 5' 6\" (1.676 m).    Weight as of this encounter: 237 lb (107.5 kg).  Medication Reconciliation: complete    Health Maintenance that is potentially due pending provider review:  NONE    n/a    "

## 2017-05-01 NOTE — MR AVS SNAPSHOT
After Visit Summary   5/1/2017    Noe Fitzgerald    MRN: [id number]           Patient Information     Date Of Birth          1970        Visit Information        Provider Department      5/1/2017 12:40 PM Abril Bello MD [facility]        Today's Diagnoses     Preop general physical exam    -  1    Large breasts        Hypertension goal BP (blood pressure) < 140/90          Care Instructions      Before Your Surgery      Call your surgeon if there is any change in your health. This includes signs of a cold or flu (such as a sore throat, runny nose, cough, rash or fever).    Do not smoke, drink alcohol or take over the counter medicine (unless your surgeon or primary care doctor tells you to) for the 24 hours before and after surgery.    If you take prescribed drugs: Follow your doctor s orders about which medicines to take and which to stop until after surgery.    Eating and drinking prior to surgery: follow the instructions from your surgeon    Take a shower or bath the night before surgery. Use the soap your surgeon gave you to gently clean your skin. If you do not have soap from your surgeon, use your regular soap. Do not shave or scrub the surgery site.  Wear clean pajamas and have clean sheets on your bed.       Stop all NSAIDS (NSAIDs: Advil, Motrin (generic ibuprofen); Aleve (generic naproxen sodium); Ascript, Trisha, Ecoorin (generic aspirin) 5 days before surgery.    Take all of your current medications with a sip of water day of surgery.                 Follow-ups after your visit        Your next 10 appointments already scheduled     May 16, 2017  2:00 PM CDT   (Arrive by 1:45 PM)   Return Plastic Surgery with Franny Reynolds MD   [facility] ([facility])    [address] [place] [address] [phone]           Do not wear perfume.            May 22, 2017   Procedure  with Franny Reynolds MD   [facility] [place], Same Day Surgery (--)    [address]  [facility] [place] [address] [phone]              Who to contact     If you have questions or need follow up information about today's clinic visit or your schedule please contact [facility] directly at [phone].  Normal or non-critical lab and imaging results will be communicated to you by [email or web address], letter or phone within 4 business days after the clinic has received the results. If you do not hear from us within 7 days, please contact the clinic through [email or web address] or phone. If you have a critical or abnormal lab result, we will notify you by phone as soon as possible.  Submit refill requests through Xendo or call your pharmacy and they will forward the refill request to us. Please allow 3 business days for your refill to be completed.          Additional Information About Your Visit        [email or web address]???? Information     Xendo gives you secure access to your electronic health record. If you see a primary care provider, you can also send messages to your care team and make appointments. If you have questions, please call your primary care clinic.  If you do not have a primary care provider, please call [phone] and they will assist you.        Care EveryWhere ID     This is your Care EveryWhere ID. This could be used by other organizations to access your [facility] medical records  [id number]        Your Vitals Were     Pulse Temperature Last Period Pulse Oximetry BMI (Body Mass Index)       84 98.6  F (37  C) (Tympanic) 04/24/2017 97% 38.25 kg/m2        Blood Pressure from Last 3 Encounters:   05/01/17 (!) 139/100   02/17/17 122/84   01/26/17 118/87    Weight from Last 3 Encounters:   05/01/17 237 lb (107.5 kg)   02/17/17 236 lb (107 kg)   01/11/17 236 lb 9.6 oz (107.3 kg)              We Performed the Following     Basic metabolic panel  (Ca, Cl, CO2, Creat, Gluc, K, Na, BUN)     Beta HCG qual IFA urine      CBC with platelets     EKG 12-lead complete w/read - Clinics          Today's Medication Changes          These changes are accurate as of: 5/1/17  1:34 PM.  If you have any questions, ask your nurse or doctor.               Stop taking these medicines if you haven't already. Please contact your care team if you have questions.     nystatin [id number] UNIT/GM Powd   Commonly known as:  MYCOSTATIN   Stopped by:  Abril Bello MD                    Primary Care Provider Office Phone # Fax #    Abril Bello [phone]       [facility] [address] [facility] [address]        Thank you!     Thank you for choosing [facility]  for your care. Our goal is always to provide you with excellent care. Hearing back from our patients is one way we can continue to improve our services. Please take a few minutes to complete the written survey that you may receive in the mail after your visit with us. Thank you!             Your Updated Medication List - Protect others around you: Learn how to safely use, store and throw away your medicines at www.disposemymeds.org.          This list is accurate as of: 5/1/17  1:34 PM.  Always use your most recent med list.                   Brand Name Dispense Instructions for use    IBU-200 PO      Take 2 tablets by mouth as needed.       lisinopril 10 MG tablet    PRINIVIL/ZESTRIL    90 tablet    Take 1 tablet (10 mg) by mouth daily       metoprolol 25 MG 24 hr tablet    TOPROL-XL    90 tablet    Take 1 tablet (25 mg) by mouth daily       omeprazole 20 MG CR capsule    priLOSEC    90 capsule    Take 1 capsule (20 mg) by mouth daily

## 2017-05-01 NOTE — PROGRESS NOTES
[facility]  [address] [facility] [address]  [phone]  Dept: [phone]    PRE-OP EVALUATION:  Today's date: 2017    Noe Fitzgerald (: 1970) presents for pre-operative evaluation assessment as requested by Dr. Reynolds.  She requires evaluation and anesthesia risk assessment prior to undergoing surgery/procedure for treatment of breasts .  Proposed procedure: mammoplasty Reduction Bilateral    Date of Surgery/ Procedure: 2017  Time of Surgery/ Procedure: 8:00am  Hospital/Surgical Facility: [facility]    Primary Physician: Abril Bello  Type of Anesthesia Anticipated: General    Patient has a Health Care Directive or Living Will:  NO    1. NO - Do you have a history of heart attack, stroke, stent, bypass or surgery on an artery in the head, neck, heart or legs?  2. NO - Do you ever have any pain or discomfort in your chest?  3. NO - Do you have a history of  Heart Failure?  4. NO - Are you troubled by shortness of breath when: walking on the level, up a slight hill or at night?  5. NO - Do you currently have a cold, bronchitis or other respiratory infection?  6. NO - Do you have a cough, shortness of breath or wheezing?  7. NO - Do you sometimes get pains in the calves of your legs when you walk?  8. NO - Do you or anyone in your family have previous history of blood clots?  9. NO - Do you or does anyone in your family have a serious bleeding problem such as prolonged bleeding following surgeries or cuts?  10. NO - Have you ever had problems with anemia or been told to take iron pills?  11. NO - Have you had any abnormal blood loss such as black, tarry or bloody stools, or abnormal vaginal bleeding?  12. NO - Have you ever had a blood transfusion?  13. NO - Have you or any of your relatives ever had problems with anesthesia?  14. NO - Do you have sleep apnea, excessive snoring or daytime drowsiness?  15. NO - Do you have any prosthetic heart valves?  16. NO - Do you  have prosthetic joints?  17. NO - Is there any chance that you may be pregnant?      HPI:                                                      Brief HPI related to upcoming procedure: Patient has a long history of back and neck pain from weight of breasts. She has tried chiropractic care, ibuprofen, and tylenol without relief. She also gets rashes from moisture under her breasts.       See problem list for active medical problems.  Problems all longstanding and stable, except as noted/documented.  See ROS for pertinent symptoms related to these conditions.                                                                                                  .    MEDICAL HISTORY:                                                      Patient Active Problem List    Diagnosis Date Noted     Papanicolaou smear of cervix with atypical squamous cells of undetermined significance (ASC-US) 02/17/2017     Priority: Medium     Ascending aorta dilatation (H) 09/12/2016     Priority: Medium     Per cardiology, put on beta blocker and recheck echocardiogram in one year       ASCUS with positive high risk HPV cervical 12/02/2015     Priority: Medium     11/23/2015:Pap--ASCUS, +HR HPV.   12/08/15 Colpo benign. Plan: co-test in 12 months.  2/17/17:Pap--NIL, neg HPV. Repeat Pap+HPV in 3 yrs. Due 2020       Hypertension goal BP (blood pressure) < 140/90 10/17/2012     Priority: Medium     CARDIOVASCULAR SCREENING; LDL GOAL LESS THAN 160 10/31/2010     Priority: Medium     Large breasts 10/27/2008     Priority: Medium      Past Medical History:   Diagnosis Date     ASCUS with positive high risk HPV 11/2015     Gynecological examination      S/P LEEP of cervix 1991    has been >20 yrs since LEEP, no abnls noted, paps moved to Q3 yrs     Supervision of normal first pregnancy      Vaginitis and vulvovaginitis, unspecified      Past Surgical History:   Procedure Laterality Date     ESOPHAGOSCOPY, GASTROSCOPY, DUODENOSCOPY (EGD), COMBINED N/A  1/26/2017    Procedure: COMBINED ESOPHAGOSCOPY, GASTROSCOPY, DUODENOSCOPY (EGD);  Surgeon: Damian Ames MD;  Location: [facility]     LEEP TX, CERVICAL  1991    LEEP TX Cervical     SURGICAL HISTORY OF -       Ear surgery     SURGICAL HISTORY OF -       Foot surgery     Current Outpatient Prescriptions   Medication Sig Dispense Refill     metoprolol (TOPROL-XL) 25 MG 24 hr tablet Take 1 tablet (25 mg) by mouth daily 90 tablet 3     omeprazole (PRILOSEC) 20 MG CR capsule Take 1 capsule (20 mg) by mouth daily 90 capsule 3     lisinopril (PRINIVIL/ZESTRIL) 10 MG tablet Take 1 tablet (10 mg) by mouth daily 90 tablet 3     Ibuprofen (IBU-200 PO) Take 2 tablets by mouth as needed.       OTC products: None, except as noted above    Allergies   Allergen Reactions     Bactrim [Sulfamethoxazole W/Trimethoprim]       Latex Allergy: NO    Social History   Substance Use Topics     Smoking status: Never Smoker     Smokeless tobacco: Never Used     Alcohol use No      Comment: rarely     History   Drug Use No       REVIEW OF SYSTEMS:                                                    C: NEGATIVE for fever, chills, change in weight  I: NEGATIVE for worrisome rashes, moles or lesions  E: NEGATIVE for vision changes or irritation  E/M: NEGATIVE for ear, mouth and throat problems  R: NEGATIVE for significant cough or SOB  B: NEGATIVE for masses, tenderness or discharge  CV: NEGATIVE for chest pain, palpitations or peripheral edema  GI: NEGATIVE for nausea, abdominal pain, heartburn, or change in bowel habits  : NEGATIVE for frequency, dysuria, or hematuria  M: NEGATIVE for significant arthralgias or myalgia  N: NEGATIVE for weakness, dizziness or paresthesias  E: NEGATIVE for temperature intolerance, skin/hair changes  H: NEGATIVE for bleeding problems  P: NEGATIVE for changes in mood or affect    EXAM:                                                    BP (!) 150/100  Pulse 84  Temp 98.6  F (37  C) (Tympanic)  Wt 237 lb (107.5 kg)   LMP 04/24/2017  SpO2 97%  BMI 38.25 kg/m2    GENERAL APPEARANCE: healthy, alert and no distress     EYES: EOMI     HENT:  Nose and mouth without ulcers or lesions. Chronic perforation right TM, fluid behind left TM.      NECK: no adenopathy, no asymmetry, masses, or scars and thyroid normal to palpation     RESP: lungs clear to auscultation - no rales, rhonchi or wheezes     BREAST: Breasts not re-examed. See note from 2/17/17.     CV: regular rates and rhythm, normal S1 S2, no S3 or S4 and no murmur, click or rub     ABDOMEN:  soft, nontender, no HSM or masses      MS: extremities normal- no gross deformities noted, no evidence of inflammation in joints,.     SKIN: no suspicious lesions or rashes    DIAGNOSTICS:                                                    EKG: appears normal, NSR, normal axis, normal intervals, no acute ST/T changes c/w ischemia, no LVH by voltage criteria, unchanged from previous tracings  Results for orders placed or performed in visit on 05/01/17 (from the past 24 hour(s))   CBC with platelets   Result Value Ref Range    WBC 4.8 4.0 - 11.0 10e9/L    RBC Count 4.40 3.8 - 5.2 10e12/L    Hemoglobin 13.2 11.7 - 15.7 g/dL    Hematocrit 38.8 35.0 - 47.0 %    MCV 88 78 - 100 fl    MCH 30.0 26.5 - 33.0 pg    MCHC 34.0 31.5 - 36.5 g/dL    RDW 12.9 10.0 - 15.0 %    Platelet Count 216 150 - 450 10e9/L   Beta HCG qual IFA urine   Result Value Ref Range    Beta HCG Qual IFA Urine Negative NEG       IMPRESSION:                                                    Reason for surgery/procedure:  Reduction of breasts tp relive neck and back pain.     The proposed surgical procedure is considered INTERMEDIATE risk.    REVISED CARDIAC RISK INDEX  The patient has the following serious cardiovascular risks for perioperative complications such as (MI, PE, VFib and 3  AV Block):  No serious cardiac risks  INTERPRETATION: 0 risks: Class I (very low risk - 0.4% complication rate)    The patient has the  following additional risks for perioperative complications:  No identified additional risks      ICD-10-CM    1. Preop general physical exam Z01.818 Beta HCG qual IFA urine     Beta HCG qual IFA urine   2. Large breasts N62 Beta HCG qual IFA urine   3. Hypertension goal BP (blood pressure) < 140/90 I10 EKG 12-lead complete w/read - Clinics     CBC with platelets     Basic metabolic panel  (Ca, Cl, CO2, Creat, Gluc, K, Na, BUN)       RECOMMENDATIONS:                                                          --Patient is to take all scheduled medications on the day of surgery     APPROVAL GIVEN to proceed with proposed procedure, without further diagnostic evaluation       Signed Electronically by: Abril Bello MD    Copy of this evaluation report is provided to requesting physician.    [facility] Preop Guidelines

## 2017-05-01 NOTE — PATIENT INSTRUCTIONS
Before Your Surgery      Call your surgeon if there is any change in your health. This includes signs of a cold or flu (such as a sore throat, runny nose, cough, rash or fever).    Do not smoke, drink alcohol or take over the counter medicine (unless your surgeon or primary care doctor tells you to) for the 24 hours before and after surgery.    If you take prescribed drugs: Follow your doctor s orders about which medicines to take and which to stop until after surgery.    Eating and drinking prior to surgery: follow the instructions from your surgeon    Take a shower or bath the night before surgery. Use the soap your surgeon gave you to gently clean your skin. If you do not have soap from your surgeon, use your regular soap. Do not shave or scrub the surgery site.  Wear clean pajamas and have clean sheets on your bed.       Stop all NSAIDS (NSAIDs: Advil, Motrin (generic ibuprofen); Aleve (generic naproxen sodium); Ascript, Trisha, Ecoorin (generic aspirin) 5 days before surgery.    Take all of your current medications with a sip of water day of surgery.

## 2017-05-16 ENCOUNTER — OFFICE VISIT (OUTPATIENT)
Dept: PLASTIC SURGERY | Facility: CLINIC | Age: 47
End: 2017-05-16

## 2017-05-16 VITALS
OXYGEN SATURATION: 97 % | DIASTOLIC BLOOD PRESSURE: 94 MMHG | HEART RATE: 79 BPM | HEIGHT: 66 IN | BODY MASS INDEX: 37.93 KG/M2 | WEIGHT: 236 LBS | TEMPERATURE: 97.4 F | SYSTOLIC BLOOD PRESSURE: 128 MMHG

## 2017-05-16 DIAGNOSIS — N62 HYPERTROPHY OF BREAST: Primary | ICD-10-CM

## 2017-05-16 ASSESSMENT — PAIN SCALES - GENERAL: PAINLEVEL: NO PAIN (0)

## 2017-05-16 NOTE — LETTER
5/16/2017       RE: Noe Fitzgerald  [address]  [facility] [address]     Dear Colleague,    Thank you for referring your patient, Noe Fitzgerald, to the [facility]GERY at [facility]. Please see a copy of my visit note below.    PLASTICS PREOP    This 47 year old female is scheduled for bilateral breast reduction on 5/22. Her preop was done 5/1 and her mammogram was negative.     We reviewed her Schnur scale weights to be around 850-900 gms for insurance coverage and she's OK with much smaller breasts if we need to make weight. While her SN-NAC distance is 41cms, her IMF-NAC is only 15 cms, so the risk of needing nipple grafts should be low.  There is a possibility of needing TONIA drains depending on the total amounts of resection.     We discussed the possible risks and complications, as well as perioperative cares and limitations for his procedure.  Periop instructions included: NPO after midnight except for am meds with sip of water, preop shower with surgical soap. The events of the day of surgery were explained - including preop, intraop and postop phases of care.  We also went over the possible risks and complications involved with this elective procedure. These include but are not limited to: infection, bleeding, hematoma/seroma formation, poor healing - including dehiscence, nipple loss, hypertrophic scarring. Altered chest/breast sensation- either hypo or hypersensitive, residual deformities and asymmetries, possible further surgical revisions, possible injury to surrounding neurovascular and musculoskeletal structures, including intra-axillary or intra-thoracic, anesthetic risks such as DVT/PE or cardiopulmonary events.  Postop cares and limitations were discussed with relation to her home and work settings.    We will fill out her 's FMLA paperwork as needed. He works in the prison system and will need to take some time  postop to help her with her cares at home.     All of her questions and concerns were addressed to her satisfaction.     Total time = 20 minutes, all spent on educating her about her upcoming procedure.      Again, thank you for allowing me to participate in the care of your patient.      Sincerely,    Franny Reynolds MD

## 2017-05-16 NOTE — NURSING NOTE
"Chief Complaint   Patient presents with     RECHECK     Pre Op Visit 5/22/17       Vitals:    05/16/17 1410   BP: (!) 128/94   BP Location: Left arm   Patient Position: Chair   Cuff Size: Adult Large   Pulse: 79   Temp: 97.4  F (36.3  C)   SpO2: 97%   Weight: 236 lb   Height: 5' 6\"       Body mass index is 38.09 kg/(m^2).    Syl Hair                          "

## 2017-05-18 ENCOUNTER — MYC MEDICAL ADVICE (OUTPATIENT)
Dept: FAMILY MEDICINE | Facility: CLINIC | Age: 47
End: 2017-05-18

## 2017-05-18 DIAGNOSIS — I10 HYPERTENSION GOAL BP (BLOOD PRESSURE) < 140/90: ICD-10-CM

## 2017-05-18 RX ORDER — LISINOPRIL 20 MG/1
20 TABLET ORAL DAILY
Qty: 90 TABLET | Refills: 1 | Status: SHIPPED | OUTPATIENT
Start: 2017-05-18 | End: 2018-02-02

## 2017-05-18 NOTE — TELEPHONE ENCOUNTER
Please see message below:     BP Readings from Last 3 Encounters:   05/16/17 (!) 128/94   05/01/17 (!) 150/100   02/17/17 122/84     Thank you    Valarie KAUR RN

## 2017-05-21 ENCOUNTER — ANESTHESIA EVENT (OUTPATIENT)
Dept: SURGERY | Facility: CLINIC | Age: 47
End: 2017-05-21
Payer: COMMERCIAL

## 2017-05-22 ENCOUNTER — HOSPITAL ENCOUNTER (OUTPATIENT)
Facility: CLINIC | Age: 47
Discharge: HOME | End: 2017-05-22
Attending: SURGERY | Admitting: SURGERY
Payer: COMMERCIAL

## 2017-05-22 ENCOUNTER — ANESTHESIA (OUTPATIENT)
Dept: SURGERY | Facility: CLINIC | Age: 47
End: 2017-05-22
Payer: COMMERCIAL

## 2017-05-22 VITALS
OXYGEN SATURATION: 94 % | TEMPERATURE: 98.6 F | HEIGHT: 66 IN | SYSTOLIC BLOOD PRESSURE: 106 MMHG | RESPIRATION RATE: 18 BRPM | DIASTOLIC BLOOD PRESSURE: 78 MMHG | WEIGHT: 235.23 LBS | BODY MASS INDEX: 37.8 KG/M2

## 2017-05-22 DIAGNOSIS — Z98.890 S/P BILATERAL BREAST REDUCTION: Primary | ICD-10-CM

## 2017-05-22 LAB
HCG SERPL QL: NEGATIVE
HGB BLD-MCNC: 13.1 G/DL (ref 11.7–15.7)

## 2017-05-22 PROCEDURE — 88305 TISSUE EXAM BY PATHOLOGIST: CPT | Performed by: SURGERY

## 2017-05-22 PROCEDURE — 36415 COLL VENOUS BLD VENIPUNCTURE: CPT | Performed by: ANESTHESIOLOGY

## 2017-05-22 PROCEDURE — 25000125 ZZHC RX 250: Performed by: ANESTHESIOLOGY

## 2017-05-22 PROCEDURE — 71000027 ZZH RECOVERY PHASE 2 EACH 15 MINS: Performed by: SURGERY

## 2017-05-22 PROCEDURE — 71000014 ZZH RECOVERY PHASE 1 LEVEL 2 FIRST HR: Performed by: SURGERY

## 2017-05-22 PROCEDURE — 85018 HEMOGLOBIN: CPT | Performed by: ANESTHESIOLOGY

## 2017-05-22 PROCEDURE — 25000128 H RX IP 250 OP 636: Performed by: NURSE ANESTHETIST, CERTIFIED REGISTERED

## 2017-05-22 PROCEDURE — 25000128 H RX IP 250 OP 636: Performed by: SURGERY

## 2017-05-22 PROCEDURE — 36000059 ZZH SURGERY LEVEL 3 EA 15 ADDTL MIN UMMC: Performed by: SURGERY

## 2017-05-22 PROCEDURE — 84703 CHORIONIC GONADOTROPIN ASSAY: CPT | Performed by: ANESTHESIOLOGY

## 2017-05-22 PROCEDURE — 36000057 ZZH SURGERY LEVEL 3 1ST 30 MIN - UMMC: Performed by: SURGERY

## 2017-05-22 PROCEDURE — 37000009 ZZH ANESTHESIA TECHNICAL FEE, EACH ADDTL 15 MIN: Performed by: SURGERY

## 2017-05-22 PROCEDURE — 25000125 ZZHC RX 250: Performed by: SURGERY

## 2017-05-22 PROCEDURE — 25000128 H RX IP 250 OP 636: Performed by: ANESTHESIOLOGY

## 2017-05-22 PROCEDURE — 25000125 ZZHC RX 250: Performed by: NURSE ANESTHETIST, CERTIFIED REGISTERED

## 2017-05-22 PROCEDURE — 27210794 ZZH OR GENERAL SUPPLY STERILE: Performed by: SURGERY

## 2017-05-22 PROCEDURE — 71000015 ZZH RECOVERY PHASE 1 LEVEL 2 EA ADDTL HR: Performed by: SURGERY

## 2017-05-22 PROCEDURE — 37000008 ZZH ANESTHESIA TECHNICAL FEE, 1ST 30 MIN: Performed by: SURGERY

## 2017-05-22 PROCEDURE — 40000170 ZZH STATISTIC PRE-PROCEDURE ASSESSMENT II: Performed by: SURGERY

## 2017-05-22 PROCEDURE — 25000566 ZZH SEVOFLURANE, EA 15 MIN: Performed by: SURGERY

## 2017-05-22 PROCEDURE — 88305 TISSUE EXAM BY PATHOLOGIST: CPT | Mod: 26 | Performed by: SURGERY

## 2017-05-22 RX ORDER — NEOSTIGMINE METHYLSULFATE 1 MG/ML
VIAL (ML) INJECTION PRN
Status: DISCONTINUED | OUTPATIENT
Start: 2017-05-22 | End: 2017-05-22

## 2017-05-22 RX ORDER — FENTANYL CITRATE 50 UG/ML
25-50 INJECTION, SOLUTION INTRAMUSCULAR; INTRAVENOUS
Status: DISCONTINUED | OUTPATIENT
Start: 2017-05-22 | End: 2017-05-23 | Stop reason: HOSPADM

## 2017-05-22 RX ORDER — ONDANSETRON 2 MG/ML
4 INJECTION INTRAMUSCULAR; INTRAVENOUS EVERY 30 MIN PRN
Status: DISCONTINUED | OUTPATIENT
Start: 2017-05-22 | End: 2017-05-23 | Stop reason: HOSPADM

## 2017-05-22 RX ORDER — LIDOCAINE 40 MG/G
CREAM TOPICAL
Status: DISCONTINUED | OUTPATIENT
Start: 2017-05-22 | End: 2017-05-22 | Stop reason: HOSPADM

## 2017-05-22 RX ORDER — NALOXONE HYDROCHLORIDE 0.4 MG/ML
.1-.4 INJECTION, SOLUTION INTRAMUSCULAR; INTRAVENOUS; SUBCUTANEOUS
Status: DISCONTINUED | OUTPATIENT
Start: 2017-05-22 | End: 2017-05-23 | Stop reason: HOSPADM

## 2017-05-22 RX ORDER — HYDROXYZINE HYDROCHLORIDE 25 MG/1
25 TABLET, FILM COATED ORAL 3 TIMES DAILY PRN
Qty: 20 TABLET | Refills: 1 | Status: SHIPPED | OUTPATIENT
Start: 2017-05-22 | End: 2017-06-20

## 2017-05-22 RX ORDER — PROPOFOL 10 MG/ML
INJECTION, EMULSION INTRAVENOUS PRN
Status: DISCONTINUED | OUTPATIENT
Start: 2017-05-22 | End: 2017-05-22

## 2017-05-22 RX ORDER — SODIUM CHLORIDE, SODIUM LACTATE, POTASSIUM CHLORIDE, CALCIUM CHLORIDE 600; 310; 30; 20 MG/100ML; MG/100ML; MG/100ML; MG/100ML
INJECTION, SOLUTION INTRAVENOUS CONTINUOUS
Status: DISCONTINUED | OUTPATIENT
Start: 2017-05-22 | End: 2017-05-22 | Stop reason: HOSPADM

## 2017-05-22 RX ORDER — DEXAMETHASONE SODIUM PHOSPHATE 4 MG/ML
INJECTION, SOLUTION INTRA-ARTICULAR; INTRALESIONAL; INTRAMUSCULAR; INTRAVENOUS; SOFT TISSUE PRN
Status: DISCONTINUED | OUTPATIENT
Start: 2017-05-22 | End: 2017-05-22

## 2017-05-22 RX ORDER — LIDOCAINE HYDROCHLORIDE AND EPINEPHRINE 10; 10 MG/ML; UG/ML
INJECTION, SOLUTION INFILTRATION; PERINEURAL PRN
Status: DISCONTINUED | OUTPATIENT
Start: 2017-05-22 | End: 2017-05-23 | Stop reason: HOSPADM

## 2017-05-22 RX ORDER — FENTANYL CITRATE 50 UG/ML
INJECTION, SOLUTION INTRAMUSCULAR; INTRAVENOUS PRN
Status: DISCONTINUED | OUTPATIENT
Start: 2017-05-22 | End: 2017-05-22

## 2017-05-22 RX ORDER — OXYCODONE HYDROCHLORIDE 5 MG/1
5-10 TABLET ORAL EVERY 4 HOURS PRN
Qty: 40 TABLET | Refills: 0 | Status: SHIPPED | OUTPATIENT
Start: 2017-05-22 | End: 2017-06-20

## 2017-05-22 RX ORDER — MEPERIDINE HYDROCHLORIDE 25 MG/ML
12.5 INJECTION INTRAMUSCULAR; INTRAVENOUS; SUBCUTANEOUS
Status: DISCONTINUED | OUTPATIENT
Start: 2017-05-22 | End: 2017-05-23 | Stop reason: HOSPADM

## 2017-05-22 RX ORDER — CEFAZOLIN SODIUM 1 G/3ML
1 INJECTION, POWDER, FOR SOLUTION INTRAMUSCULAR; INTRAVENOUS SEE ADMIN INSTRUCTIONS
Status: DISCONTINUED | OUTPATIENT
Start: 2017-05-22 | End: 2017-05-22 | Stop reason: HOSPADM

## 2017-05-22 RX ORDER — EPHEDRINE SULFATE 50 MG/ML
INJECTION, SOLUTION INTRAMUSCULAR; INTRAVENOUS; SUBCUTANEOUS PRN
Status: DISCONTINUED | OUTPATIENT
Start: 2017-05-22 | End: 2017-05-22

## 2017-05-22 RX ORDER — SODIUM CHLORIDE, SODIUM LACTATE, POTASSIUM CHLORIDE, CALCIUM CHLORIDE 600; 310; 30; 20 MG/100ML; MG/100ML; MG/100ML; MG/100ML
INJECTION, SOLUTION INTRAVENOUS CONTINUOUS
Status: DISCONTINUED | OUTPATIENT
Start: 2017-05-22 | End: 2017-05-23 | Stop reason: HOSPADM

## 2017-05-22 RX ORDER — ONDANSETRON 4 MG/1
4 TABLET, ORALLY DISINTEGRATING ORAL EVERY 8 HOURS PRN
Qty: 20 TABLET | Refills: 1 | Status: SHIPPED | OUTPATIENT
Start: 2017-05-22 | End: 2017-06-20

## 2017-05-22 RX ORDER — ONDANSETRON 4 MG/1
4 TABLET, ORALLY DISINTEGRATING ORAL EVERY 30 MIN PRN
Status: DISCONTINUED | OUTPATIENT
Start: 2017-05-22 | End: 2017-05-23 | Stop reason: HOSPADM

## 2017-05-22 RX ORDER — GLYCOPYRROLATE 0.2 MG/ML
INJECTION, SOLUTION INTRAMUSCULAR; INTRAVENOUS PRN
Status: DISCONTINUED | OUTPATIENT
Start: 2017-05-22 | End: 2017-05-22

## 2017-05-22 RX ORDER — LIDOCAINE HYDROCHLORIDE 20 MG/ML
INJECTION, SOLUTION INFILTRATION; PERINEURAL PRN
Status: DISCONTINUED | OUTPATIENT
Start: 2017-05-22 | End: 2017-05-22

## 2017-05-22 RX ORDER — SCOLOPAMINE TRANSDERMAL SYSTEM 1 MG/1
1 PATCH, EXTENDED RELEASE TRANSDERMAL ONCE
Status: COMPLETED | OUTPATIENT
Start: 2017-05-22 | End: 2017-05-22

## 2017-05-22 RX ORDER — CEFAZOLIN SODIUM 2 G/100ML
2 INJECTION, SOLUTION INTRAVENOUS
Status: COMPLETED | OUTPATIENT
Start: 2017-05-22 | End: 2017-05-22

## 2017-05-22 RX ORDER — AZITHROMYCIN 250 MG/1
250 TABLET, FILM COATED ORAL DAILY
Qty: 6 TABLET | Refills: 0 | Status: SHIPPED | OUTPATIENT
Start: 2017-05-22 | End: 2017-06-20

## 2017-05-22 RX ORDER — ONDANSETRON 2 MG/ML
INJECTION INTRAMUSCULAR; INTRAVENOUS PRN
Status: DISCONTINUED | OUTPATIENT
Start: 2017-05-22 | End: 2017-05-22

## 2017-05-22 RX ADMIN — Medication: at 22:11

## 2017-05-22 RX ADMIN — CEFAZOLIN SODIUM 1 G: 2 INJECTION, SOLUTION INTRAVENOUS at 17:21

## 2017-05-22 RX ADMIN — ONDANSETRON 4 MG: 2 INJECTION INTRAMUSCULAR; INTRAVENOUS at 18:44

## 2017-05-22 RX ADMIN — FENTANYL CITRATE 50 MCG: 50 INJECTION, SOLUTION INTRAMUSCULAR; INTRAVENOUS at 14:15

## 2017-05-22 RX ADMIN — Medication 7.5 MG: at 13:53

## 2017-05-22 RX ADMIN — FENTANYL CITRATE 50 MCG: 50 INJECTION, SOLUTION INTRAMUSCULAR; INTRAVENOUS at 19:19

## 2017-05-22 RX ADMIN — PHENYLEPHRINE HYDROCHLORIDE 50 MCG: 10 INJECTION, SOLUTION INTRAMUSCULAR; INTRAVENOUS; SUBCUTANEOUS at 16:56

## 2017-05-22 RX ADMIN — FENTANYL CITRATE 50 MCG: 50 INJECTION, SOLUTION INTRAMUSCULAR; INTRAVENOUS at 19:54

## 2017-05-22 RX ADMIN — LIDOCAINE HYDROCHLORIDE 50 MG: 20 INJECTION, SOLUTION INFILTRATION; PERINEURAL at 12:46

## 2017-05-22 RX ADMIN — PROPOFOL 40 MG: 10 INJECTION, EMULSION INTRAVENOUS at 13:05

## 2017-05-22 RX ADMIN — Medication 5 MG: at 15:14

## 2017-05-22 RX ADMIN — HYDROMORPHONE HYDROCHLORIDE 0.3 MG: 1 INJECTION, SOLUTION INTRAMUSCULAR; INTRAVENOUS; SUBCUTANEOUS at 20:04

## 2017-05-22 RX ADMIN — SODIUM CHLORIDE, POTASSIUM CHLORIDE, SODIUM LACTATE AND CALCIUM CHLORIDE: 600; 310; 30; 20 INJECTION, SOLUTION INTRAVENOUS at 12:29

## 2017-05-22 RX ADMIN — NEOSTIGMINE METHYLSULFATE 3 MG: 1 INJECTION INTRAMUSCULAR; INTRAVENOUS; SUBCUTANEOUS at 19:16

## 2017-05-22 RX ADMIN — PHENYLEPHRINE HYDROCHLORIDE 100 MCG: 10 INJECTION, SOLUTION INTRAMUSCULAR; INTRAVENOUS; SUBCUTANEOUS at 17:33

## 2017-05-22 RX ADMIN — MIDAZOLAM HYDROCHLORIDE 2 MG: 1 INJECTION, SOLUTION INTRAMUSCULAR; INTRAVENOUS at 12:31

## 2017-05-22 RX ADMIN — GLYCOPYRROLATE 0.6 MG: 0.2 INJECTION, SOLUTION INTRAMUSCULAR; INTRAVENOUS at 19:16

## 2017-05-22 RX ADMIN — FENTANYL CITRATE 50 MCG: 50 INJECTION, SOLUTION INTRAMUSCULAR; INTRAVENOUS at 14:11

## 2017-05-22 RX ADMIN — HYDROMORPHONE HYDROCHLORIDE 0.5 MG: 1 INJECTION, SOLUTION INTRAMUSCULAR; INTRAVENOUS; SUBCUTANEOUS at 14:23

## 2017-05-22 RX ADMIN — Medication 5 MG: at 13:40

## 2017-05-22 RX ADMIN — PROPOFOL 200 MG: 10 INJECTION, EMULSION INTRAVENOUS at 12:46

## 2017-05-22 RX ADMIN — Medication 10 MG: at 13:36

## 2017-05-22 RX ADMIN — SCOPALAMINE 1 PATCH: 1 PATCH, EXTENDED RELEASE TRANSDERMAL at 11:33

## 2017-05-22 RX ADMIN — PHENYLEPHRINE HYDROCHLORIDE 25 MCG: 10 INJECTION, SOLUTION INTRAMUSCULAR; INTRAVENOUS; SUBCUTANEOUS at 16:40

## 2017-05-22 RX ADMIN — CEFAZOLIN SODIUM 2 G: 2 INJECTION, SOLUTION INTRAVENOUS at 13:21

## 2017-05-22 RX ADMIN — CEFAZOLIN SODIUM 1 G: 2 INJECTION, SOLUTION INTRAVENOUS at 15:21

## 2017-05-22 RX ADMIN — FENTANYL CITRATE 50 MCG: 50 INJECTION, SOLUTION INTRAMUSCULAR; INTRAVENOUS at 19:38

## 2017-05-22 RX ADMIN — DEXAMETHASONE SODIUM PHOSPHATE 4 MG: 4 INJECTION, SOLUTION INTRAMUSCULAR; INTRAVENOUS at 13:20

## 2017-05-22 RX ADMIN — PHENYLEPHRINE HYDROCHLORIDE 50 MCG: 10 INJECTION, SOLUTION INTRAMUSCULAR; INTRAVENOUS; SUBCUTANEOUS at 14:46

## 2017-05-22 RX ADMIN — FENTANYL CITRATE 50 MCG: 50 INJECTION, SOLUTION INTRAMUSCULAR; INTRAVENOUS at 13:36

## 2017-05-22 RX ADMIN — FENTANYL CITRATE 50 MCG: 50 INJECTION, SOLUTION INTRAMUSCULAR; INTRAVENOUS at 13:04

## 2017-05-22 RX ADMIN — PHENYLEPHRINE HYDROCHLORIDE 50 MCG: 10 INJECTION, SOLUTION INTRAMUSCULAR; INTRAVENOUS; SUBCUTANEOUS at 15:32

## 2017-05-22 RX ADMIN — SODIUM CHLORIDE, POTASSIUM CHLORIDE, SODIUM LACTATE AND CALCIUM CHLORIDE: 600; 310; 30; 20 INJECTION, SOLUTION INTRAVENOUS at 15:42

## 2017-05-22 RX ADMIN — Medication 40 MG: at 12:53

## 2017-05-22 RX ADMIN — DEXAMETHASONE SODIUM PHOSPHATE 6 MG: 4 INJECTION, SOLUTION INTRAMUSCULAR; INTRAVENOUS at 13:15

## 2017-05-22 RX ADMIN — PHENYLEPHRINE HYDROCHLORIDE 25 MCG: 10 INJECTION, SOLUTION INTRAMUSCULAR; INTRAVENOUS; SUBCUTANEOUS at 16:24

## 2017-05-22 RX ADMIN — HYDROMORPHONE HYDROCHLORIDE 0.5 MG: 1 INJECTION, SOLUTION INTRAMUSCULAR; INTRAVENOUS; SUBCUTANEOUS at 15:44

## 2017-05-22 RX ADMIN — FENTANYL CITRATE 50 MCG: 50 INJECTION, SOLUTION INTRAMUSCULAR; INTRAVENOUS at 14:29

## 2017-05-22 RX ADMIN — Medication 10 MG: at 13:06

## 2017-05-22 RX ADMIN — PHENYLEPHRINE HYDROCHLORIDE 100 MCG: 10 INJECTION, SOLUTION INTRAMUSCULAR; INTRAVENOUS; SUBCUTANEOUS at 17:05

## 2017-05-22 RX ADMIN — PHENYLEPHRINE HYDROCHLORIDE 50 MCG: 10 INJECTION, SOLUTION INTRAMUSCULAR; INTRAVENOUS; SUBCUTANEOUS at 13:21

## 2017-05-22 RX ADMIN — FENTANYL CITRATE 50 MCG: 50 INJECTION, SOLUTION INTRAMUSCULAR; INTRAVENOUS at 12:46

## 2017-05-22 NOTE — ANESTHESIA PREPROCEDURE EVALUATION
Anesthesia Evaluation     . Pt has had prior anesthetic. Type: MAC           ROS/MED HX    ENT/Pulmonary:       Neurologic:       Cardiovascular: Comment: H/o ascending aortic dilatation. (09/12/2016).  Per PCP note, pt placed on beta blocker with repeat ECHO in 1 year.    (+) Dyslipidemia, hypertension----. : . . . :. . Previous cardiac testing Echodate:09/07/2016results:Interpretation Summary     The aortic valve is not well visualized.  No aortic stenosis is present.  Partial effacement of sinutubular ridge. Sinus valsalva 37mm, ascending aorta  39-41mm  The ascending aorta is Mildly dilated.    EF 55-60%date: results:ECG reviewed date:05/01/2017 results:NSR @ 77 bpm date: results:          METS/Exercise Tolerance:     Hematologic:         Musculoskeletal:         GI/Hepatic:     (+) GERD Asymptomatic on medication,       Renal/Genitourinary:         Endo:     (+) Obesity, .      Psychiatric:         Infectious Disease:         Malignancy:         Other:                     Physical Exam  Normal systems: cardiovascular, pulmonary and dental    Airway   Mallampati: II  TM distance: >3 FB  Neck ROM: full    Dental     Cardiovascular       Pulmonary     Other findings: Rear lower crowns                Anesthesia Plan      History & Physical Review  History and physical reviewed and following examination; no interval change.    ASA Status:  3 .    NPO Status:  > 8 hours    Plan for ETT and General with Propofol induction. Maintenance will be Balanced.    PONV prophylaxis:  Ondansetron (or other 5HT-3) and Scopolamine patch       Postoperative Care  Postoperative pain management:  IV analgesics.      Consents  Anesthetic plan, risks, benefits and alternatives discussed with:  Patient and Spouse..            Plan - GETA.  Transdermal scopolamine patch in PreOp.    Risks and possible complications of GETA discussed in full with patient.  She voices understanding and wishes to proceed.    Dr. Virginia Roman,  MD  Anesthesia  05/22/2017 @ 1034 am              .

## 2017-05-22 NOTE — IP AVS SNAPSHOT
[place] [address] [facility] [address]    Phone:  [phone]                                       After Visit Summary   5/22/2017    Noe Fitzgerald    MRN: [id number]           After Visit Summary Signature Page     I have received my discharge instructions, and my questions have been answered. I have discussed any challenges I see with this plan with the nurse or doctor.    ..........................................................................................................................................  Patient/Patient Representative Signature      ..........................................................................................................................................  Patient Representative Print Name and Relationship to Patient    ..................................................               ................................................  Date                                            Time    ..........................................................................................................................................  Reviewed by Signature/Title    ...................................................              ..............................................  Date                                                            Time

## 2017-05-22 NOTE — IP AVS SNAPSHOT
MRN:[id number]                      After Visit Summary   5/22/2017    Noe Fitzgerald    MRN: [id number]           Thank you!     Thank you for choosing [facility] for your care. Our goal is always to provide you with excellent care. Hearing back from our patients is one way we can continue to improve our services. Please take a few minutes to complete the written survey that you may receive in the mail after you visit with us. Thank you!        Patient Information     Date Of Birth          1970        About your hospital stay     You were admitted on:  May 22, 2017 [facility]    You were discharged on:  May 22, 2017        Reason for your hospital stay       S/p bilateral breast reduction.   Tolerated under GET.  OK to dc home per anesthesia criteria                  Who to Call     For medical emergencies, please call 911.  For non-urgent questions about your medical care, please call your primary care provider or clinic, [phone]  For questions related to your surgery, please call your surgery clinic        Attending Provider     Provider Specialty    Franny Reynolds MD Plastic Surgery       Primary Care Provider Office Phone # Fax #    Abril Bello [phone]       [facility] [address] [place] [id number]         When to contact your care team       Call plastics on-call ([phone]) or clinic nurses ([phone]) if you have any of the following: temperature greater than 102.5. Excessive bright bleeding from TONIA drain. Significant swelling on one side with increasing pain (expanding hematoma). Problems with drains or dressings or meds.                  After Care Instructions     Activity       Your activity upon discharge: no heavy lifting for 3 weeks. Avoid excessive or extreme movement of upper extremities. Avoid direct trauma to surgical sites.            Diet       Follow this diet upon discharge:  Advance to a regular diet as tolerated            Monitor and record       Drain output            Supplies       List the supplies the pt needs to go home: please send extra ACE wraps from OR home with patient.  Please send supplies for TONIA drain cares. Thanks!            Tubes and drains       You are going home with the following tubes or drains: TONIA.  Follow these instructions to care for your tube:  STRIP and record tubing output Qam and Qpm.            Wound care and dressings       Instructions to care for your wound at home: as directed. OK to rewrap ACE if too tight or too loose, or if shower. rewrap over old t-shirt or gauze padding.                  Follow-up Appointments     Follow Up and recommended labs and tests       Follow up with me,  Franny Reynolds, within 1 week. to evaluate after surgery.  No follow up labs or test are needed.  Clinic RN will call you to assess when drain tube can be removed.                  Your next 10 appointments already scheduled     Jun 06, 2017  4:00 PM CDT   (Arrive by 3:45 PM)   Post-Op with Franny Reynolds MD   [facility]astic [facility] ([facility])    [address] [place] [address] [id number] [phone]              Further instructions from your care team       Same-Day Surgery   Adult Discharge Orders & Instructions     For 24 hours after surgery:  1. Get plenty of rest.  A responsible adult must stay with you for at least 24 hours after you leave the hospital.   2. Pain medication can slow your reflexes. Do not drive or use heavy equipment.  If you have weakness or tingling, don't drive or use heavy equipment until this feeling goes away.  3. Mixing alcohol and pain medication can cause dizziness and slow your breathing. It can even be fatal. Do not drink alcohol while taking pain medication.  4. Avoid strenuous or risky activities.  Ask for help when climbing stairs.   5. You may feel  lightheaded.  If so, sit for a few minutes before standing.  Have someone help you get up.   6. If you have nausea (feel sick to your stomach), drink only clear liquids such as apple juice, ginger ale, broth or 7-Up.  Rest may also help.  Be sure to drink enough fluids.  Move to a regular diet as you feel able. Take pain medications with a small amount of solid food, such as toast or crackers, to avoid nausea.   7. A slight fever is normal. Call the doctor if your fever is over 100 F (37.7 C) (taken under the tongue) or lasts longer than 24 hours.  8. You may have a dry mouth, muscle aches, trouble sleeping or a sore throat.  These symptoms should go away after 24 hours.  9. Do not make important or legal decisions.   Pain Management:      1. Take pain medication (if prescribed) for pain as directed by your physician.        2. WARNING: If the pain medication you have been prescribed contains Tylenol  (acetaminophen), DO NOT take additional doses of Tylenol (acetaminophen).     Call your doctor for any of the followin.  Signs of infection (fever, growing tenderness at the surgery site, severe pain, a large amount of drainage or bleeding, foul-smelling drainage, redness, swelling).    2.  It has been over 8 to 10 hours since surgery and you are still not able to urinate (pee).    3.  Headache for over 24 hours.    4.  Numbness, tingling or weakness the day after surgery (if you had spinal anesthesia).  To contact a doctor, call _____________________________________ or:      [phone] and ask for the Resident On Call for:          __Plastic Surgery__________ (answered 24 hours a day)      Emergency Department:  [facility]: [phone]  [facility]: [phone]               Rev. 10/2014       Pending Results     No orders found from 2017 to 2017.            Admission Information     Date & Time Provider Department Dept. Phone    2017 Franny Reynolds  "MD [facility] PACU [phone]      Your Vitals Were     Blood Pressure Temperature Respirations Height Weight Last Period    107/76 98.6  F (37  C) (Oral) 16 1.676 m (5' 6\") 106.7 kg (235 lb 3.7 oz) 04/22/2017 (Exact Date)    Pulse Oximetry BMI (Body Mass Index)                93% 37.97 kg/m2          [employer] Information     [employer] gives you secure access to your electronic health record. If you see a primary care provider, you can also send messages to your care team and make appointments. If you have questions, please call your primary care clinic.  If you do not have a primary care provider, please call [phone] and they will assist you.        Care EveryWhere ID     This is your Care EveryWhere ID. This could be used by other organizations to access your [facility] medical records  [id number]           Review of your medicines      START taking        Dose / Directions    azithromycin 250 MG tablet   Commonly known as:  ZITHROMAX Z-ALFRED   Used for:  S/P bilateral breast reduction        Dose:  250 mg   Take 1 tablet (250 mg) by mouth daily Take 2 tabs 1st day   Quantity:  6 tablet   Refills:  0       hydrOXYzine 25 MG tablet   Commonly known as:  ATARAX   Used for:  S/P bilateral breast reduction        Dose:  25 mg   Take 1 tablet (25 mg) by mouth 3 times daily as needed for itching   Quantity:  20 tablet   Refills:  1       ondansetron 4 MG ODT tab   Commonly known as:  ZOFRAN ODT   Used for:  S/P bilateral breast reduction        Dose:  4 mg   Take 1 tablet (4 mg) by mouth every 8 hours as needed for nausea   Quantity:  20 tablet   Refills:  1       oxyCODONE 5 MG IR tablet   Commonly known as:  ROXICODONE   Used for:  S/P bilateral breast reduction        Dose:  5-10 mg   Take 1-2 tablets (5-10 mg) by mouth every 4 hours as needed for moderate to severe pain   Quantity:  40 tablet   Refills:  0         CONTINUE these medicines which may have CHANGED, or have new prescriptions. If we are uncertain of " the size of tablets/capsules you have at home, strength may be listed as something that might have changed.        Dose / Directions    lisinopril 20 MG tablet   Commonly known as:  PRINIVIL/ZESTRIL   This may have changed:  when to take this   Used for:  Hypertension goal BP (blood pressure) < 140/90        Dose:  20 mg   Take 1 tablet (20 mg) by mouth daily   Quantity:  90 tablet   Refills:  1       metoprolol 25 MG 24 hr tablet   Commonly known as:  TOPROL-XL   This may have changed:  when to take this   Used for:  Ascending aorta dilatation (H)        Dose:  25 mg   Take 1 tablet (25 mg) by mouth daily   Quantity:  90 tablet   Refills:  3       omeprazole 20 MG CR capsule   Commonly known as:  priLOSEC   This may have changed:  when to take this   Used for:  Gastroesophageal reflux disease without esophagitis        Dose:  20 mg   Take 1 capsule (20 mg) by mouth daily   Quantity:  90 capsule   Refills:  3         STOP taking     IBU-200 PO                Where to get your medicines      These medications were sent to [facility] [address]  [address] [address]     Phone:  [phone]     azithromycin 250 MG tablet    hydrOXYzine 25 MG tablet    ondansetron 4 MG ODT tab         Some of these will need a paper prescription and others can be bought over the counter. Ask your nurse if you have questions.     Bring a paper prescription for each of these medications     oxyCODONE 5 MG IR tablet                Protect others around you: Learn how to safely use, store and throw away your medicines at www.disposemymeds.org.             Medication List: This is a list of all your medications and when to take them. Check marks below indicate your daily home schedule. Keep this list as a reference.      Medications           Morning Afternoon Evening Bedtime As Needed    azithromycin 250 MG tablet   Commonly known as:  ZITHROMAX Z-ALFRED   Take 1 tablet (250 mg)  by mouth daily Take 2 tabs 1st day                                hydrOXYzine 25 MG tablet   Commonly known as:  ATARAX   Take 1 tablet (25 mg) by mouth 3 times daily as needed for itching                                lisinopril 20 MG tablet   Commonly known as:  PRINIVIL/ZESTRIL   Take 1 tablet (20 mg) by mouth daily                                metoprolol 25 MG 24 hr tablet   Commonly known as:  TOPROL-XL   Take 1 tablet (25 mg) by mouth daily                                omeprazole 20 MG CR capsule   Commonly known as:  priLOSEC   Take 1 capsule (20 mg) by mouth daily                                ondansetron 4 MG ODT tab   Commonly known as:  ZOFRAN ODT   Take 1 tablet (4 mg) by mouth every 8 hours as needed for nausea                                oxyCODONE 5 MG IR tablet   Commonly known as:  ROXICODONE   Take 1-2 tablets (5-10 mg) by mouth every 4 hours as needed for moderate to severe pain

## 2017-05-23 ENCOUNTER — TELEPHONE (OUTPATIENT)
Dept: PLASTIC SURGERY | Facility: CLINIC | Age: 47
End: 2017-05-23

## 2017-05-23 NOTE — BRIEF OP NOTE
Brief Op Note    Pre Op Diagnosis: symptomatic bilateral breast hypertrophy  Post Op Diagnosis: same  Procedure: bilateral breast reduction  Anesthesia:GET  Surgeon: Rosalina Reynolds MD  Assistant: none  EBL: 100 ml  IV fluids:2000  Urine output:1200  Counts: correct  Specimens: right breast=1134 gms. Left breast= 1102 gms  Drain: TONIA x 2  Condition: stable  Findings:good nipple perfusion. No obvious abnormalities. Specimens sent to pathology.      Rosalina Reynolds MD

## 2017-05-23 NOTE — TELEPHONE ENCOUNTER
RN Post Op Care Coordination Note    Ms. Noe Fitzgerald is a 47 year old female who underwent bilateral breast reduction on 5/22/17 with Dr. Reynolds for a history of hypertrophy of breast.  Spoke with Patient.    Support  Family member assisting with care   staying home assistingGALO paperwork faxed 5/23/17   Health Status  Fevers/chills: Patient reports subjective fever and chills. Minor chills feels as if a little warm.  Patient does not have thermometer.  Nausea/Vomiting: Patient reports feeling nauseated after eating, no vomiting after last night.  Nausea is getting better with medication and time patient reports.   Eating/drinking: Patient is able to eat and drink without any complaints. Patient is eating soups and soft foods working her way up to more solid foods.   Bowel habits: Patient reports no bowel movement since surgery.   Urinary: Voiding normally  Drains (TONIA): Output 25 to 10 mL each empty , Color dark red   Incisions: Patient denies any signs and symptoms of infection.  Vascular Assessment: Patient reports good color motion sensitivity with no numbness or tingling.  Pain: Patient reports pain as a 7 out of 10.  The pain is located high in between the breast area . Pain is managed with Narcotics.    Activity/Restrictions  Following restrictions outlined by surgeon.    Equipment  Ace wrap    Whom and When to Call  Patient acknowledges understanding of how to manage any medication changes and when to seek medical care.     Patient advised that if after hour medical concerns arise to please call [phone] and choose option 4 to speak to the physician on call.       Follow up appointment scheduled for 6/6/17 with Dr. Reynolds.

## 2017-05-23 NOTE — ANESTHESIA CARE TRANSFER NOTE
Patient: Noe Fitzgerald    Procedure(s):  Bilateral Breast Reduction Mammoplasty - Wound Class: I-Clean    Diagnosis: Symptomatic Breast Hypertrophy  Diagnosis Additional Information: No value filed.    Anesthesia Type:   ETT, General     Note:  Airway :Face Mask  Patient transferred to:PACU        Vitals: (Last set prior to Anesthesia Care Transfer)    CRNA VITALS  5/22/2017 1901 - 5/22/2017 1939      5/22/2017             NIBP: (!)  138/91    Ht Rate: 80                Electronically Signed By: GEETHA Escobar CRNA  May 22, 2017  7:39 PM

## 2017-05-23 NOTE — DISCHARGE INSTRUCTIONS
Same-Day Surgery   Adult Discharge Orders & Instructions     For 24 hours after surgery:  1. Get plenty of rest.  A responsible adult must stay with you for at least 24 hours after you leave the hospital.   2. Pain medication can slow your reflexes. Do not drive or use heavy equipment.  If you have weakness or tingling, don't drive or use heavy equipment until this feeling goes away.  3. Mixing alcohol and pain medication can cause dizziness and slow your breathing. It can even be fatal. Do not drink alcohol while taking pain medication.  4. Avoid strenuous or risky activities.  Ask for help when climbing stairs.   5. You may feel lightheaded.  If so, sit for a few minutes before standing.  Have someone help you get up.   6. If you have nausea (feel sick to your stomach), drink only clear liquids such as apple juice, ginger ale, broth or 7-Up.  Rest may also help.  Be sure to drink enough fluids.  Move to a regular diet as you feel able. Take pain medications with a small amount of solid food, such as toast or crackers, to avoid nausea.   7. A slight fever is normal. Call the doctor if your fever is over 100 F (37.7 C) (taken under the tongue) or lasts longer than 24 hours.  8. You may have a dry mouth, muscle aches, trouble sleeping or a sore throat.  These symptoms should go away after 24 hours.  9. Do not make important or legal decisions.   Pain Management:      1. Take pain medication (if prescribed) for pain as directed by your physician.        2. WARNING: If the pain medication you have been prescribed contains Tylenol  (acetaminophen), DO NOT take additional doses of Tylenol (acetaminophen).     Call your doctor for any of the followin.  Signs of infection (fever, growing tenderness at the surgery site, severe pain, a large amount of drainage or bleeding, foul-smelling drainage, redness, swelling).    2.  It has been over 8 to 10 hours since surgery and you are still not able to urinate (pee).    3.   Headache for over 24 hours.    4.  Numbness, tingling or weakness the day after surgery (if you had spinal anesthesia).  To contact a doctor, call _____________________________________ or:      [phone] and ask for the Resident On Call for:          __Plastic Surgery__________ (answered 24 hours a day)      Emergency Department:  [facility]: [phone]  [facility]: [phone]               Rev. 10/2014

## 2017-05-23 NOTE — OP NOTE
DATE OF SERVICE:  05/22/2017      ATTENDING PHYSICIAN:  Gail.      PREOPERATIVE DIAGNOSIS:  Symptomatic bilateral breast hypertrophy.      POSTOPERATIVE DIAGNOSIS:  Symptomatic bilateral breast hypertrophy.      PROCEDURE:  Bilateral breast reduction, mammoplasty.      ANESTHESIA:  GET.      ESTIMATED BLOOD LOSS:  100 mL.      IV FLUIDS:  2000 mL.      URINE OUTPUT:  1200 mL.      COUNTS:  Correct.      COMPLICATIONS:  None.      DRAINS:  TONIA x2.      SPECIMEN:  Right breast 1134 g, left breast 1102 grams.  Note that for a portion of this case a student nurse first assist Austin Wynn was assisting.      INDICATIONS:  Noe Fitzgerald is a 47-year-old female who presented with symptomatic bilateral breast hypertrophy.  She met insurance criteria for breast reduction and per the Schnur scale would require at least 850 grams removal per breast.  Due to her large size and ptosis, we did have a discussion about the possibility of nipple grafting.  We also talked about the possibility that she might have fairly small breasts for her body habitus.  She felt comfortable with having small breasts if necessary.      DESCRIPTION OF PROCEDURE:  The patient was seen in the preoperative waiting area.  The operative sites were marked.  This included the sternal notch, sternal midline, inframammary folds and essentially a Burgos pattern.  The vertical limbs were approximately 8 cm.  The base was 14 cm with a small triangle at the midline.  We did draw an extension of the IMF incision out laterally to include some of her lateral thoracic rolls.  By estimation, the right breast was slightly larger than the left.  Otherwise, they were fairly symmetric.  Informed consent was obtained after reviewing the possible risks and complications including but not limited to the following:  Infection, bleeding, hematoma, seroma formation, poor healing, including possible dehiscence, possible fat necrosis, possible skin or nipple necrosis,  possible hypertrophic scarring, altered sensation of the operative site including either hypo or hypersensitivity of the nipple areolar complex, asymmetries, residual deformities, possible injury to surrounding neurovascular and musculoskeletal structures as well as intrathoracic and intra-axillary structures, need for further surgery and anesthetic risks such as DVT, PE and cardiopulmonary arrest.  The patient was then brought to the operating room, placed supine on the OR table.  After general anesthesia was administered, the patient was secured with her arms on arm boards using Ace wraps.  These were at 90 degrees from the OR table.  After Serrano was placed, additional padding was placed over her thighs and forelegs with security straps.  She already had sequential compression devices on her lower extremities prior to induction.  The patient was then put into a sitting position to check for symmetry and adjustments were made as necessary.  Chest breast area was then prepped and draped in the usual sterile fashion using ChloraPrep.      After timeout and the proper patient and procedure were identified, we prashant out an approximately 10 to 12 cm wide pedicle to de-epithelialize.  The skin was injected in the subq, subdermal layer with 1% lidocaine with epinephrine.  Approximately 50 mL was used per side.  A 38 mm nipple marker was used to identify our NAC.  The pedicle skin was then deepithelialized sharply with a #10 blade.  Great care was taken not to undermine the NAC.  After this was done, the incisions for the superior skin flaps were incised sharply with a peak plasma blade on the right side and a scalpel followed by Valleylab cautery on the left.  The skin flaps were made approximately 4 cm in thickness.  Once this dissection was taken down to the pec fascia, we then turned our attention to incising the inferior pedicle.  This was done in a similar fashion and the excess tissue surrounding the pedicle was then  resected with the Bovie.  Great care was taken not to undermine the pedicle or disrupt its blood supply.  The resected tissue was then weighed off the backtable and kept for ultimate submission to pathology.  The nipple areola complex had adequate perfusion throughout the case.  We temporarily stapled our skin envelope and put the patient into a sitting position.  This allowed us to alfonso the area for resecting the dog ear and to further adjust our skin flaps.  This took off another centimeter to bring our distance from the NAC to the IMF to be approximately 5 cm.  Some additional tissue needed to be resected from the left breast to achieve better symmetry for volume and shape.  When the rest of the tissue was resected, we put the patient back into a sitting position with temporary skin staples.  We had pretty good symmetry with regards to volume and shape and incisions.  A 38 mm nipple marker was then used again at 5 cm from the IMF and marked with a marker.  The patient was then put into a supine position again and the entire dissection pocket was irrigated with triple antibiotic solution.  Final hemostasis was achieved with cautery.  15 round TONIA drains were introduced through separate stab wound incisions laterally and secured with 3-0 nylon suture.  We then performed a definitive closure with 3-0 Vicryl deep dermal buried sutures followed by 4-0 Vicryl running subcuticular sutures.  The nipple inset site was then excised sharply and the NAC was delivered into the wound and secured with 3-0 Vicryl deep dermal buried sutures.  The skin was reapproximated with 5-0 fast absorbing running cutaneous sutures.  When we were happy with our closure, Dermabond Prineo was applied to the incisions.  JPs were trimmed and put to bulb suction.  Additional dressings of Kerlix were applied and then a double long 6-inch Ace was wrapped from the patient's chest circumferentially.  The Serrano was removed.  The patient was extubated.   She was transferred to a stretcher and taken to the recovery room in satisfactory condition having tolerated the procedure without difficulty or complication.  Of note, final weights for breast tissue was 1134 grams on the right and 1102 grams on the left.  This tissue was then sent to pathology for permanent examination for histologic abnormalities.         HAWA CARRILLO MD             D: 2017 08:54   T: 2017 11:18   MT: MIGUELINA      Name:     LISA PRATT   MRN:      [id number]        Account:        [id number]   :      1970           Procedure Date: 2017      Document: [id number]

## 2017-05-23 NOTE — ANESTHESIA POSTPROCEDURE EVALUATION
Patient: Noe Fitzgerald    Procedure(s):  Bilateral Breast Reduction Mammoplasty - Wound Class: I-Clean    Diagnosis:Symptomatic Breast Hypertrophy  Diagnosis Additional Information: No value filed.    Anesthesia Type:  ETT, General    Note:  Anesthesia Post Evaluation    Patient location during evaluation: PACU  Patient participation: Able to fully participate in evaluation  Level of consciousness: awake and alert  Pain management: adequate  Airway patency: patent  Cardiovascular status: acceptable  Respiratory status: acceptable  Hydration status: acceptable  PONV: none     Anesthetic complications: None    Comments: Doing well.        Last vitals:  Vitals:    05/22/17 2015 05/22/17 2030 05/22/17 2045   BP: 118/77 116/73 107/81   Resp: 16 20 16   Temp:      SpO2: 94% 94% 94%         Electronically Signed By: Juan Carlos Ojeda MD  May 22, 2017  8:53 PM

## 2017-05-26 ENCOUNTER — OFFICE VISIT (OUTPATIENT)
Dept: FAMILY MEDICINE | Facility: CLINIC | Age: 47
End: 2017-05-26
Payer: COMMERCIAL

## 2017-05-26 VITALS
HEART RATE: 88 BPM | DIASTOLIC BLOOD PRESSURE: 100 MMHG | SYSTOLIC BLOOD PRESSURE: 144 MMHG | WEIGHT: 226 LBS | BODY MASS INDEX: 36.48 KG/M2

## 2017-05-26 DIAGNOSIS — Z98.890 S/P BILATERAL BREAST REDUCTION: Primary | ICD-10-CM

## 2017-05-26 LAB — COPATH REPORT: NORMAL

## 2017-05-26 PROCEDURE — 99213 OFFICE O/P EST LOW 20 MIN: CPT | Performed by: FAMILY MEDICINE

## 2017-05-26 NOTE — NURSING NOTE
"Chief Complaint   Patient presents with     Surgical Followup     breast reduction - requesting tubes to be pulled       Initial BP (!) 144/100  Pulse 88  Wt 226 lb (102.5 kg)  LMP 04/22/2017 (Exact Date)  BMI 36.48 kg/m2 Estimated body mass index is 36.48 kg/(m^2) as calculated from the following:    Height as of 5/22/17: 5' 6\" (1.676 m).    Weight as of this encounter: 226 lb (102.5 kg).  Medication Reconciliation: complete    Health Maintenance that is potentially due pending provider review:  NONE    n/a    "

## 2017-05-26 NOTE — MR AVS SNAPSHOT
After Visit Summary   5/26/2017    Noe Fitzgerald    MRN: [id number]           Patient Information     Date Of Birth          1970        Visit Information        Provider Department      5/26/2017 12:40 PM Abril Bello MD [facility]        Today's Diagnoses     S/P bilateral breast reduction    -  1       Follow-ups after your visit        Your next 10 appointments already scheduled     May 30, 2017 12:30 PM CDT   (Arrive by 12:15 PM)   Post-Op with Franny Reynolds MD   [facility] Plastic and Reconstructive Surgery ([facility])    [address] [place] [address]  4th [facility] [address] [phone]              Who to contact     If you have questions or need follow up information about today's clinic visit or your schedule please contact [facility] directly at [phone].  Normal or non-critical lab and imaging results will be communicated to you by MyChart, letter or phone within 4 business days after the clinic has received the results. If you do not hear from us within 7 days, please contact the clinic through MyChart or phone. If you have a critical or abnormal lab result, we will notify you by phone as soon as possible.  Submit refill requests through [employer] or call your pharmacy and they will forward the refill request to us. Please allow 3 business days for your refill to be completed.          Additional Information About Your Visit        MyChart Information     [employer] gives you secure access to your electronic health record. If you see a primary care provider, you can also send messages to your care team and make appointments. If you have questions, please call your primary care clinic.  If you do not have a primary care provider, please call [phone] and they will assist you.        Care EveryWhere ID     This is your Care EveryWhere ID. This could be used by other organizations to access your [place]  medical records  [id number]        Your Vitals Were     Pulse Last Period BMI (Body Mass Index)             88 04/22/2017 (Exact Date) 36.48 kg/m2          Blood Pressure from Last 3 Encounters:   05/26/17 (!) 144/100   05/22/17 106/78   05/16/17 (!) 128/94    Weight from Last 3 Encounters:   05/26/17 226 lb (102.5 kg)   05/22/17 235 lb 3.7 oz (106.7 kg)   05/16/17 236 lb (107 kg)              Today, you had the following     No orders found for display         Today's Medication Changes          These changes are accurate as of: 5/26/17 11:59 PM.  If you have any questions, ask your nurse or doctor.               These medicines have changed or have updated prescriptions.        Dose/Directions    lisinopril 20 MG tablet   Commonly known as:  PRINIVIL/ZESTRIL   This may have changed:  when to take this   Used for:  Hypertension goal BP (blood pressure) < 140/90        Dose:  20 mg   Take 1 tablet (20 mg) by mouth daily   Quantity:  90 tablet   Refills:  1       metoprolol 25 MG 24 hr tablet   Commonly known as:  TOPROL-XL   This may have changed:  when to take this   Used for:  Ascending aorta dilatation (H)        Dose:  25 mg   Take 1 tablet (25 mg) by mouth daily   Quantity:  90 tablet   Refills:  3       omeprazole 20 MG CR capsule   Commonly known as:  priLOSEC   This may have changed:  when to take this   Used for:  Gastroesophageal reflux disease without esophagitis        Dose:  20 mg   Take 1 capsule (20 mg) by mouth daily   Quantity:  90 capsule   Refills:  3                Primary Care Provider Office Phone # Fax #    Abril Bello [phone]       [facility] [address]  [address]        Thank you!     Thank you for choosing [facility]  for your care. Our goal is always to provide you with excellent care. Hearing back from our patients is one way we can continue to improve our services. Please take a few minutes to complete the written  survey that you may receive in the mail after your visit with us. Thank you!             Your Updated Medication List - Protect others around you: Learn how to safely use, store and throw away your medicines at www.disposemymeds.org.          This list is accurate as of: 5/26/17 11:59 PM.  Always use your most recent med list.                   Brand Name Dispense Instructions for use    azithromycin 250 MG tablet    ZITHROMAX Z-ALFRED    6 tablet    Take 1 tablet (250 mg) by mouth daily Take 2 tabs 1st day       hydrOXYzine 25 MG tablet    ATARAX    20 tablet    Take 1 tablet (25 mg) by mouth 3 times daily as needed for itching       lisinopril 20 MG tablet    PRINIVIL/ZESTRIL    90 tablet    Take 1 tablet (20 mg) by mouth daily       metoprolol 25 MG 24 hr tablet    TOPROL-XL    90 tablet    Take 1 tablet (25 mg) by mouth daily       omeprazole 20 MG CR capsule    priLOSEC    90 capsule    Take 1 capsule (20 mg) by mouth daily       ondansetron 4 MG ODT tab    ZOFRAN ODT    20 tablet    Take 1 tablet (4 mg) by mouth every 8 hours as needed for nausea       oxyCODONE 5 MG IR tablet    ROXICODONE    40 tablet    Take 1-2 tablets (5-10 mg) by mouth every 4 hours as needed for moderate to severe pain

## 2017-05-26 NOTE — PROGRESS NOTES
SUBJECTIVE:                                                    Noe Fitzgerald is a 47 year old female who presents to clinic today for the following health issues:    Post surgical breast reduction 5/22/17  Tubes were to be removed yesterday, but there was a problem with scheduling. She is here today for tubal removal.     Problem list and histories reviewed & adjusted, as indicated.  Additional history: as documented    BP Readings from Last 3 Encounters:   05/26/17 (!) 144/100   05/22/17 106/78   05/16/17 (!) 128/94    Wt Readings from Last 3 Encounters:   05/26/17 102.5 kg (226 lb)   05/22/17 106.7 kg (235 lb 3.7 oz)   05/16/17 107 kg (236 lb)         ROS:  C: NEGATIVE for fever, chills, change in weight  BREAST: NEGATIVE for masses, tenderness or discharge    OBJECTIVE:                                                    BP (!) 144/100  Pulse 88  Wt 102.5 kg (226 lb)  LMP 04/22/2017 (Exact Date)  BMI 36.48 kg/m2  Body mass index is 36.48 kg/(m^2).   General: appears well, in no distress   Breasts with multiple well healing incisions  No fluid draining from drainage tubes in breasts.          ASSESSMENT/PLAN:                                                      ASSESSMENT:  1. S/P bilateral breast reduction      Patient told what to look for in case of infection, tenderness, fever.    Procedure note:   Sutures clipped and bilateral drainage tubes removed without difficulty.   follow up with surgeon as planned    This document serves as a record of the services and decisions personally performed and made by Abril Bello MD. It was created on her behalf by Adela Bae, a trained medical scribe. The creation of this document is based the provider's statements to the medical scribe.  Adela Bae 12:56 PM 5/26/2017    Provider:   The information in this document, created by the medical scribe for me, accurately reflects the services I personally performed and the decisions made by me. I have reviewed  and approved this document for accuracy prior to leaving the patient care area.  Abril Bello MD 12:56 PM 5/26/2017    Abril Bello MD  [facility]

## 2017-05-30 DIAGNOSIS — M62.830 BACK MUSCLE SPASM: Primary | ICD-10-CM

## 2017-05-30 DIAGNOSIS — Z98.890 S/P REDUCTION MAMMOPLASTY: Primary | ICD-10-CM

## 2017-05-30 RX ORDER — CYCLOBENZAPRINE HCL 5 MG
5 TABLET ORAL 3 TIMES DAILY PRN
Qty: 42 TABLET | Refills: 0 | Status: CANCELLED | OUTPATIENT
Start: 2017-05-30

## 2017-05-30 RX ORDER — CYCLOBENZAPRINE HCL 5 MG
5 TABLET ORAL 3 TIMES DAILY PRN
Qty: 42 TABLET | Refills: 0 | Status: SHIPPED | OUTPATIENT
Start: 2017-05-30 | End: 2018-10-25

## 2017-06-20 ENCOUNTER — OFFICE VISIT (OUTPATIENT)
Dept: PLASTIC SURGERY | Facility: CLINIC | Age: 47
End: 2017-06-20

## 2017-06-20 VITALS
DIASTOLIC BLOOD PRESSURE: 104 MMHG | WEIGHT: 226.5 LBS | HEART RATE: 92 BPM | TEMPERATURE: 97.5 F | OXYGEN SATURATION: 95 % | BODY MASS INDEX: 36.4 KG/M2 | SYSTOLIC BLOOD PRESSURE: 149 MMHG | HEIGHT: 66 IN

## 2017-06-20 DIAGNOSIS — Z98.890 POSTOPERATIVE STATE: Primary | ICD-10-CM

## 2017-06-20 DIAGNOSIS — Z98.890 S/P BILATERAL BREAST REDUCTION: ICD-10-CM

## 2017-06-20 ASSESSMENT — PAIN SCALES - GENERAL: PAINLEVEL: WORST PAIN (10)

## 2017-06-20 NOTE — NURSING NOTE
"Chief Complaint   Patient presents with     Surgical Followup     4 week post op visit       Vitals:    06/20/17 1702   BP: (!) 149/104   BP Location: Left arm   Patient Position: Chair   Cuff Size: Adult Large   Pulse: 92   Temp: 97.5  F (36.4  C)   TempSrc: Oral   SpO2: 95%   Weight: 102.7 kg (226 lb 8 oz)   Height: 1.676 m (5' 6\")       Body mass index is 36.56 kg/(m^2).      Татьяна Bravo"

## 2017-06-20 NOTE — LETTER
6/20/2017       RE: Noe Fitzgerald  [address] [address]     Dear Colleague,    Thank you for referring your patient, Noe Fitzgerald, to the [facility] P[facility]IC [facility] RECONSTRUCTIVE SURGERY at [facility]. Please see a copy of my visit note below.    PLASTICS POSTOP  HISTORY OF PRESENT ILLNESS:  This is a 47-year-old female who is approximately 4 weeks status post bilateral breast reduction mammoplasty.  She had some issues early on with her drains due to the large resection weight, but that was removed locally by one of her providers after about 4 days.  Once they came out, she was feeling much better.  She is still having some problems having to sleep supine since she is usually a stomach sleeper.  She has been using a sports bra because she feels like she is kind of in between sizes for other bras.  The majority of the Prineo was still intact and that was removed today.  She has a few little scattered areas of rawness at the T-junction on the right and just a little bit around the areola on the left, but otherwise she is healing quite nicely.  Overall, I would say she has pretty good symmetry as far shape and volume.  She has increased sensation in both nipples.  Overall, I think she is quite happy with them.  I think they match her body habitus, although she did comment on her large belly.      ASSESSMENT AND PLAN:  At this point she has been complaining of lower back pain and really in need of chiropractic adjustment, but we have not been comfortable letting her lie prone for that.  I think she can trial sleeping prone with her sports bra here in the next week or so and see how that goes and possibly see her chiropractor to do any possible adjustments from a lateral position or maybe a sitting chair position, but I would wait an additional 2 weeks before she gets any adjustments in the prone position since she is still kind of midway on the curve for  laying down collagen.  Once those little raw areas heal over, she is okay for sitting.  She is hoping to go back to work on 07/05 and may be calling for a letter for return to work.  I would like to see her in a couple months just to make sure all of her healing is good and see how things settle out after the edema and the remodeling has occurred.  We gave her some Uni-Solve to remove the tape residue from the Dermabond Prineo and she will let us know if there are any other issues or concerns.     Again, thank you for allowing me to participate in the care of your patient.      Sincerely,    Franny Reynolds MD

## 2017-06-20 NOTE — PROGRESS NOTES
PLASTICS POSTOP  HISTORY OF PRESENT ILLNESS:  This is a 47-year-old female who is approximately 4 weeks status post bilateral breast reduction mammoplasty.  She had some issues early on with her drains due to the large resection weight, but that was removed locally by one of her providers after about 4 days.  Once they came out, she was feeling much better.  She is still having some problems having to sleep supine since she is usually a stomach sleeper.  She has been using a sports bra because she feels like she is kind of in between sizes for other bras.  The majority of the Prineo was still intact and that was removed today.  She has a few little scattered areas of rawness at the T-junction on the right and just a little bit around the areola on the left, but otherwise she is healing quite nicely.  Overall, I would say she has pretty good symmetry as far shape and volume.  She has increased sensation in both nipples.  Overall, I think she is quite happy with them.  I think they match her body habitus, although she did comment on her large belly.      ASSESSMENT AND PLAN:  At this point she has been complaining of lower back pain and really in need of chiropractic adjustment, but we have not been comfortable letting her lie prone for that.  I think she can trial sleeping prone with her sports bra here in the next week or so and see how that goes and possibly see her chiropractor to do any possible adjustments from a lateral position or maybe a sitting chair position, but I would wait an additional 2 weeks before she gets any adjustments in the prone position since she is still kind of midway on the curve for laying down collagen.  Once those little raw areas heal over, she is okay for sitting.  She is hoping to go back to work on 07/05 and may be calling for a letter for return to work.  I would like to see her in a couple months just to make sure all of her healing is good and see how things settle out after the  edema and the remodeling has occurred.  We gave her some Uni-Solve to remove the tape residue from the Dermabond eo and she will let us know if there are any other issues or concerns.

## 2017-06-20 NOTE — MR AVS SNAPSHOT
After Visit Summary   6/20/2017    Noe Fitzgerald    MRN: [id number]           Patient Information     Date Of Birth          1970        Visit Information        Provider Department      6/20/2017 4:30 PM Franny Reynolds MD [facility] Plastic and Reconstructive Surgery        Today's Diagnoses     Postoperative state    -  1    S/P bilateral breast reduction           Follow-ups after your visit        Who to contact     Please call your clinic at [phone] to:    Ask questions about your health    Make or cancel appointments    Discuss your medicines    Learn about your test results    Speak to your doctor   If you have compliments or concerns about an experience at your clinic, or if you wish to file a complaint, please contact [facility] Patient Relations at [phone] or email us at [email or web address]         Additional Information About Your Visit        MyChart Information     [email or web address] gives you secure access to your electronic health record. If you see a primary care provider, you can also send messages to your care team and make appointments. If you have questions, please call your primary care clinic.  If you do not have a primary care provider, please call [phone] and they will assist you.      [employer] is an electronic gateway that provides easy, online access to your medical records. With [employer], you can request a clinic appointment, read your test results, renew a prescription or communicate with your care team.     To access your existing account, please contact your [facility] or call [phone] for assistance.        Care EveryWhere ID     This is your Care EveryWhere ID. This could be used by other organizations to access your [place] medical records  [id number]        Your Vitals Were     Pulse Temperature Height Last Period Pulse Oximetry BMI (Body Mass Index)    92 97.5  F (36.4  C)  "(Oral) 5' 6\" 06/05/2017 (Approximate) 95% 36.56 kg/m2       Blood Pressure from Last 3 Encounters:   06/20/17 (!) 149/104   05/26/17 (!) 144/100   05/22/17 106/78    Weight from Last 3 Encounters:   06/20/17 226 lb 8 oz   05/26/17 226 lb   05/22/17 235 lb 3.7 oz              Today, you had the following     No orders found for display         Today's Medication Changes          These changes are accurate as of: 6/20/17 11:59 PM.  If you have any questions, ask your nurse or doctor.               These medicines have changed or have updated prescriptions.        Dose/Directions    lisinopril 20 MG tablet   Commonly known as:  PRINIVIL/ZESTRIL   This may have changed:  when to take this   Used for:  Hypertension goal BP (blood pressure) < 140/90        Dose:  20 mg   Take 1 tablet (20 mg) by mouth daily   Quantity:  90 tablet   Refills:  1       metoprolol 25 MG 24 hr tablet   Commonly known as:  TOPROL-XL   This may have changed:  when to take this   Used for:  Ascending aorta dilatation (H)        Dose:  25 mg   Take 1 tablet (25 mg) by mouth daily   Quantity:  90 tablet   Refills:  3       omeprazole 20 MG CR capsule   Commonly known as:  priLOSEC   This may have changed:  when to take this   Used for:  Gastroesophageal reflux disease without esophagitis        Dose:  20 mg   Take 1 capsule (20 mg) by mouth daily   Quantity:  90 capsule   Refills:  3                Primary Care Provider Office Phone # Fax #    JoannNely Bello [phone]       [facility] [address] [place] [address]        Equal Access to Services     [facility]: Hadii matt arreguin hadasho Soomaali, waaxda luqadaha, qaybta kaalmada adeegocnor, david idimagali guillen. So [facility] [phone].    ATENCIÓN: Si habla español, tiene a gaytan disposición servicios gratuitos de asistencia lingüística. Llame al [phone].    We comply with applicable federal civil rights laws and [place] laws. We do not " discriminate on the basis of race, color, national origin, age, disability sex, sexual orientation or gender identity.            Thank you!     Thank you for choosing [facility] PLASTIC AND RECONSTRUCTIVE SURGERY  for your care. Our goal is always to provide you with excellent care. Hearing back from our patients is one way we can continue to improve our services. Please take a few minutes to complete the written survey that you may receive in the mail after your visit with us. Thank you!             Your Updated Medication List - Protect others around you: Learn how to safely use, store and throw away your medicines at www.disposemymeds.org.          This list is accurate as of: 6/20/17 11:59 PM.  Always use your most recent med list.                   Brand Name Dispense Instructions for use Diagnosis    cyclobenzaprine 5 MG tablet    FLEXERIL    42 tablet    Take 1 tablet (5 mg) by mouth 3 times daily as needed for muscle spasms    Back muscle spasm       lisinopril 20 MG tablet    PRINIVIL/ZESTRIL    90 tablet    Take 1 tablet (20 mg) by mouth daily    Hypertension goal BP (blood pressure) < 140/90       metoprolol 25 MG 24 hr tablet    TOPROL-XL    90 tablet    Take 1 tablet (25 mg) by mouth daily    Ascending aorta dilatation (H)       omeprazole 20 MG CR capsule    priLOSEC    90 capsule    Take 1 capsule (20 mg) by mouth daily    Gastroesophageal reflux disease without esophagitis

## 2017-07-17 NOTE — PROGRESS NOTES
PLASTICS PREOP    This 47 year old female is scheduled for bilateral breast reduction on 5/22. Her preop was done 5/1 and her mammogram was negative.     We reviewed her Schnur scale weights to be around 850-900 gms for insurance coverage and she's OK with much smaller breasts if we need to make weight. While her SN-NAC distance is 41cms, her IMF-NAC is only 15 cms, so the risk of needing nipple grafts should be low.  There is a possibility of needing TONIA drains depending on the total amounts of resection.     We discussed the possible risks and complications, as well as perioperative cares and limitations for his procedure.  Periop instructions included: NPO after midnight except for am meds with sip of water, preop shower with surgical soap. The events of the day of surgery were explained - including preop, intraop and postop phases of care.  We also went over the possible risks and complications involved with this elective procedure. These include but are not limited to: infection, bleeding, hematoma/seroma formation, poor healing - including dehiscence, nipple loss, hypertrophic scarring. Altered chest/breast sensation- either hypo or hypersensitive, residual deformities and asymmetries, possible further surgical revisions, possible injury to surrounding neurovascular and musculoskeletal structures, including intra-axillary or intra-thoracic, anesthetic risks such as DVT/PE or cardiopulmonary events.  Postop cares and limitations were discussed with relation to her home and work settings.    We will fill out her 's [place] paperwork as needed. 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     All of her questions and concerns were addressed to her satisfaction.     Total time = 20 minutes, all spent on educating her about her upcoming procedure.

## 2017-07-21 ENCOUNTER — TELEPHONE (OUTPATIENT)
Dept: PLASTIC SURGERY | Facility: CLINIC | Age: 47
End: 2017-07-21

## 2017-07-21 NOTE — TELEPHONE ENCOUNTER
Left message on voicemail to make appt with dr Reynolds before resuming these activities        ----- Message from Татьяна Bravo LPN sent at 7/21/2017  9:14 AM CDT -----  Regarding: FW: Questions--would like a call back today  Neftali Bass,   Can you please give her a call back.  I am not able to get to her today.  Thanks so much.  Татьяна  ----- Message -----     From: Yue Curry RN     Sent: 7/20/2017   1:40 PM       To: Татьяна Bravo LPN  Subject: Questions--would like a call back today          BRM on 5/22    1.  Can she go on amusement rides?  2. Can she start exercising?    Selena

## 2018-02-02 DIAGNOSIS — I10 HYPERTENSION GOAL BP (BLOOD PRESSURE) < 140/90: ICD-10-CM

## 2018-02-02 DIAGNOSIS — I77.810 ASCENDING AORTA DILATATION (H): ICD-10-CM

## 2018-02-02 RX ORDER — METOPROLOL SUCCINATE 25 MG/1
TABLET, EXTENDED RELEASE ORAL
Qty: 90 TABLET | Refills: 3 | Status: SHIPPED | OUTPATIENT
Start: 2018-02-02 | End: 2019-02-23

## 2018-02-02 RX ORDER — LISINOPRIL 20 MG/1
TABLET ORAL
Qty: 90 TABLET | Refills: 1 | Status: SHIPPED | OUTPATIENT
Start: 2018-02-02 | End: 2018-08-21

## 2018-08-21 DIAGNOSIS — I10 HYPERTENSION GOAL BP (BLOOD PRESSURE) < 140/90: ICD-10-CM

## 2018-08-21 RX ORDER — LISINOPRIL 20 MG/1
TABLET ORAL
Qty: 30 TABLET | Refills: 0 | Status: SHIPPED | OUTPATIENT
Start: 2018-08-21 | End: 2018-10-02

## 2018-08-21 NOTE — TELEPHONE ENCOUNTER
"Requested Prescriptions   Pending Prescriptions Disp Refills     lisinopril (PRINIVIL/ZESTRIL) 20 MG tablet [Pharmacy Med Name: LISINOPRIL 20MG     TAB] 90 tablet 1     Sig: TAKE ONE TABLET BY MOUTH ONCE DAILY    ACE Inhibitors (Including Combos) Protocol Failed    8/21/2018  1:59 PM       Failed - Blood pressure under 140/90 in past 12 months    BP Readings from Last 3 Encounters:   06/20/17 (!) 149/104   05/26/17 (!) 144/100   05/22/17 106/78                Failed - Recent (12 mo) or future (30 days) visit within the authorizing provider's specialty    Patient had office visit in the last 12 months or has a visit in the next 30 days with authorizing provider or within the authorizing provider's specialty.  See \"Patient Info\" tab in inbasket, or \"Choose Columns\" in Meds & Orders section of the refill encounter.           Failed - Normal serum creatinine on file in past 12 months    Recent Labs   Lab Test  05/01/17   1314   CR  0.88            Failed - Normal serum potassium on file in past 12 months    Recent Labs   Lab Test  05/01/17   1314   POTASSIUM  4.0            Passed - Patient is age 18 or older       Passed - No active pregnancy on record       Passed - No positive pregnancy test in past 12 months          "

## 2018-10-02 DIAGNOSIS — I10 HYPERTENSION GOAL BP (BLOOD PRESSURE) < 140/90: ICD-10-CM

## 2018-10-02 RX ORDER — LISINOPRIL 20 MG/1
TABLET ORAL
Qty: 14 TABLET | Refills: 0 | Status: SHIPPED | OUTPATIENT
Start: 2018-10-02 | End: 2018-10-17

## 2018-10-02 NOTE — TELEPHONE ENCOUNTER
"Requested Prescriptions   Pending Prescriptions Disp Refills     lisinopril (PRINIVIL/ZESTRIL) 20 MG tablet [Pharmacy Med Name: LISINOPRIL 20MG     TAB] 30 tablet 0     Sig: TAKE 1 TABLET BY MOUTH ONCE DAILY (NEED  APPOINTMENT  BEFORE  FURTHER  REFILLS)    ACE Inhibitors (Including Combos) Protocol Failed    10/2/2018  1:16 PM       Failed - Blood pressure under 140/90 in past 12 months    BP Readings from Last 3 Encounters:   06/20/17 (!) 149/104   05/26/17 (!) 144/100   05/22/17 106/78                Failed - Normal serum creatinine on file in past 12 months    Recent Labs   Lab Test  05/01/17   1314   CR  0.88            Failed - Normal serum potassium on file in past 12 months    Recent Labs   Lab Test  05/01/17   1314   POTASSIUM  4.0            Passed - Recent (12 mo) or future (30 days) visit within the authorizing provider's specialty    Patient had office visit in the last 12 months or has a visit in the next 30 days with authorizing provider or within the authorizing provider's specialty.  See \"Patient Info\" tab in inbasket, or \"Choose Columns\" in Meds & Orders section of the refill encounter.           Passed - Patient is age 18 or older       Passed - No active pregnancy on record       Passed - No positive pregnancy test in past 12 months            "

## 2018-10-17 DIAGNOSIS — I10 HYPERTENSION GOAL BP (BLOOD PRESSURE) < 140/90: ICD-10-CM

## 2018-10-17 RX ORDER — LISINOPRIL 20 MG/1
TABLET ORAL
Qty: 14 TABLET | Refills: 0 | Status: SHIPPED | OUTPATIENT
Start: 2018-10-17 | End: 2018-10-25

## 2018-10-17 NOTE — TELEPHONE ENCOUNTER
"** Pt has appt scheduled 10/25/18 Dr. Flores. Pt is requesting another two weeks until appt.  Requested Prescriptions   Pending Prescriptions Disp Refills     lisinopril (PRINIVIL/ZESTRIL) 20 MG tablet 14 tablet 0    ACE Inhibitors (Including Combos) Protocol Failed    10/17/2018 10:29 AM       Failed - Blood pressure under 140/90 in past 12 months    BP Readings from Last 3 Encounters:   06/20/17 (!) 149/104   05/26/17 (!) 144/100   05/22/17 106/78                Failed - Normal serum creatinine on file in past 12 months    Recent Labs   Lab Test  05/01/17   1314   CR  0.88            Failed - Normal serum potassium on file in past 12 months    Recent Labs   Lab Test  05/01/17   1314   POTASSIUM  4.0            Passed - Recent (12 mo) or future (30 days) visit within the authorizing provider's specialty    Patient had office visit in the last 12 months or has a visit in the next 30 days with authorizing provider or within the authorizing provider's specialty.  See \"Patient Info\" tab in inbasket, or \"Choose Columns\" in Meds & Orders section of the refill encounter.             Passed - Patient is age 18 or older       Passed - No active pregnancy on record       Passed - No positive pregnancy test in past 12 months        Last Written Prescription Date: 10/2/18  Last Fill Quantity: 14,  # refills: 0   Last office visit: 5/26/2017 with prescribing provider:  Sandra   Future Office Visit:   Next 5 appointments (look out 90 days)     Oct 25, 2018  8:00 AM CDT   SHORT with Abril Bello MD   [facility] ([facility]    [address] [place] [id number]   [phone]                      "

## 2018-10-25 ENCOUNTER — OFFICE VISIT (OUTPATIENT)
Dept: FAMILY MEDICINE | Facility: CLINIC | Age: 48
End: 2018-10-25
Payer: COMMERCIAL

## 2018-10-25 VITALS
BODY MASS INDEX: 38.09 KG/M2 | HEIGHT: 66 IN | OXYGEN SATURATION: 97 % | TEMPERATURE: 97.8 F | SYSTOLIC BLOOD PRESSURE: 126 MMHG | RESPIRATION RATE: 16 BRPM | HEART RATE: 71 BPM | WEIGHT: 237 LBS | DIASTOLIC BLOOD PRESSURE: 80 MMHG

## 2018-10-25 DIAGNOSIS — Z13.6 CARDIOVASCULAR SCREENING; LDL GOAL LESS THAN 160: ICD-10-CM

## 2018-10-25 DIAGNOSIS — Z00.00 ROUTINE GENERAL MEDICAL EXAMINATION AT A HEALTH CARE FACILITY: Primary | ICD-10-CM

## 2018-10-25 DIAGNOSIS — L98.9 SKIN LESION: ICD-10-CM

## 2018-10-25 DIAGNOSIS — I10 HYPERTENSION GOAL BP (BLOOD PRESSURE) < 140/90: ICD-10-CM

## 2018-10-25 DIAGNOSIS — Z23 NEED FOR PROPHYLACTIC VACCINATION AND INOCULATION AGAINST INFLUENZA: ICD-10-CM

## 2018-10-25 DIAGNOSIS — E66.01 MORBID OBESITY (H): ICD-10-CM

## 2018-10-25 LAB
ALBUMIN SERPL-MCNC: 3.5 G/DL (ref 3.4–5)
ALP SERPL-CCNC: 66 U/L (ref 40–150)
ALT SERPL W P-5'-P-CCNC: 25 U/L (ref 0–50)
ANION GAP SERPL CALCULATED.3IONS-SCNC: 6 MMOL/L (ref 3–14)
AST SERPL W P-5'-P-CCNC: 17 U/L (ref 0–45)
BILIRUB SERPL-MCNC: 0.4 MG/DL (ref 0.2–1.3)
BUN SERPL-MCNC: 10 MG/DL (ref 7–30)
CALCIUM SERPL-MCNC: 8.5 MG/DL (ref 8.5–10.1)
CHLORIDE SERPL-SCNC: 103 MMOL/L (ref 94–109)
CHOLEST SERPL-MCNC: 230 MG/DL
CO2 SERPL-SCNC: 29 MMOL/L (ref 20–32)
CREAT SERPL-MCNC: 0.93 MG/DL (ref 0.52–1.04)
GFR SERPL CREATININE-BSD FRML MDRD: 64 ML/MIN/1.7M2
GLUCOSE SERPL-MCNC: 88 MG/DL (ref 70–99)
HDLC SERPL-MCNC: 38 MG/DL
LDLC SERPL CALC-MCNC: 155 MG/DL
NONHDLC SERPL-MCNC: 192 MG/DL
POTASSIUM SERPL-SCNC: 4.2 MMOL/L (ref 3.4–5.3)
PROT SERPL-MCNC: 7.5 G/DL (ref 6.8–8.8)
SODIUM SERPL-SCNC: 138 MMOL/L (ref 133–144)
TRIGL SERPL-MCNC: 186 MG/DL

## 2018-10-25 PROCEDURE — 90686 IIV4 VACC NO PRSV 0.5 ML IM: CPT | Performed by: FAMILY MEDICINE

## 2018-10-25 PROCEDURE — 99213 OFFICE O/P EST LOW 20 MIN: CPT | Mod: 25 | Performed by: FAMILY MEDICINE

## 2018-10-25 PROCEDURE — 90471 IMMUNIZATION ADMIN: CPT | Performed by: FAMILY MEDICINE

## 2018-10-25 PROCEDURE — 36415 COLL VENOUS BLD VENIPUNCTURE: CPT | Performed by: FAMILY MEDICINE

## 2018-10-25 PROCEDURE — 80053 COMPREHEN METABOLIC PANEL: CPT | Performed by: FAMILY MEDICINE

## 2018-10-25 PROCEDURE — 99396 PREV VISIT EST AGE 40-64: CPT | Mod: 25 | Performed by: FAMILY MEDICINE

## 2018-10-25 PROCEDURE — 80061 LIPID PANEL: CPT | Performed by: FAMILY MEDICINE

## 2018-10-25 RX ORDER — LOSARTAN POTASSIUM 50 MG/1
50 TABLET ORAL DAILY
Qty: 90 TABLET | Refills: 3 | Status: SHIPPED | OUTPATIENT
Start: 2018-10-25 | End: 2018-12-12

## 2018-10-25 RX ORDER — TRIAMCINOLONE ACETONIDE 1 MG/G
CREAM TOPICAL
Qty: 30 G | Refills: 0 | Status: SHIPPED | OUTPATIENT
Start: 2018-10-25 | End: 2018-11-17

## 2018-10-25 ASSESSMENT — ENCOUNTER SYMPTOMS
HEADACHES: 0
ABDOMINAL PAIN: 0
JOINT SWELLING: 0
SORE THROAT: 0
NAUSEA: 0
FREQUENCY: 0
CHILLS: 0
FEVER: 0
NERVOUS/ANXIOUS: 0
COUGH: 0
BREAST MASS: 0
DIARRHEA: 0
MYALGIAS: 0
HEMATURIA: 0
WEAKNESS: 0
DYSURIA: 0
EYE PAIN: 0
SHORTNESS OF BREATH: 0
DIZZINESS: 0
ARTHRALGIAS: 0
HEMATOCHEZIA: 0
HEARTBURN: 1
PARESTHESIAS: 0
CONSTIPATION: 0
PALPITATIONS: 0

## 2018-10-25 NOTE — PROGRESS NOTES
SUBJECTIVE:   CC: Noe Fitzgerald is an 48 year old woman who presents for preventive health visit.     Physical   Annual:     Getting at least 3 servings of Calcium per day:  Yes    Bi-annual eye exam:  NO    Dental care twice a year:  Yes    Sleep apnea or symptoms of sleep apnea:  None    Diet:  Regular (no restrictions)    Frequency of exercise:  2-3 days/week    Duration of exercise:  15-30 minutes    Taking medications regularly:  Yes    Medication side effects:  None    Additional concerns today:  No  Hypertension     Diet:  Regular (no restrictions)  Frequency of exercise:  2-3 days/week  Duration of exercise:  15-30 minutes  Taking medications regularly:  Yes  Medication side effects:  None  Additional concerns today:  No    Ear pain-had hole in TM and surgical fix years ago, have been sore lately    Hypertension Follow-up      Outpatient blood pressures are not being checked.    Low Salt Diet: low salt    On lisinopril, metoprolol  Still has phlegm in throat, may be from lisinopril.     BP Readings from Last 6 Encounters:   10/25/18 126/80   06/20/17 (!) 149/104   05/26/17 (!) 144/100   05/22/17 106/78   05/16/17 (!) 128/94   05/01/17 (!) 150/100      Skin lesion-has something on her elbow, has been there awhile, a little sore    Today's PHQ-2 Score:   PHQ-2 ( 1999 [employer]) 10/25/2018   Q1: Little interest or pleasure in doing things 0   Q2: Feeling down, depressed or hopeless 0   PHQ-2 Score 0   Q1: Little interest or pleasure in doing things Not at all   Q2: Feeling down, depressed or hopeless Not at all   PHQ-2 Score 0       Abuse: Current or Past(Physical, Sexual or Emotional)- No  Do you feel safe in your environment - Yes    Social History   Substance Use Topics     Smoking status: Never Smoker     Smokeless tobacco: Never Used     Alcohol use No      Comment: rarely     Alcohol Use 10/25/2018   If you drink alcohol do you typically have greater than 3 drinks per day OR greater than 7 drinks per  week? No       Reviewed orders with patient.  Reviewed health maintenance and updated orders accordingly - Yes  BP Readings from Last 3 Encounters:   10/25/18 126/80   06/20/17 (!) 149/104   05/26/17 (!) 144/100    Wt Readings from Last 3 Encounters:   10/25/18 237 lb (107.5 kg)   06/20/17 226 lb 8 oz (102.7 kg)   05/26/17 226 lb (102.5 kg)                    Patient under age 50, mutual decision reflected in health maintenance.      Pertinent mammograms are reviewed under the imaging tab.  History of abnormal Pap smear:   Last 3 Pap and HPV Results:   PAP / HPV Latest Ref Rng & Units 2/17/2017 11/23/2015 2/21/2013   PAP - NIL ASC-US(A) NIL   HPV 16 DNA NEG Negative Negative -   HPV 18 DNA NEG Negative Negative -   OTHER HR HPV NEG Negative Positive(A) -     PAP / HPV Latest Ref Rng & Units 2/17/2017 11/23/2015 2/21/2013   PAP - NIL ASC-US(A) NIL   HPV 16 DNA NEG Negative Negative -   HPV 18 DNA NEG Negative Negative -   OTHER HR HPV NEG Negative Positive(A) -     Reviewed and updated as needed this visit by clinical staff  Tobacco  Allergies  Meds  Problems  Med Hx  Surg Hx  Fam Hx  Soc Hx       Answers for HPI/ROS submitted by the patient on 10/25/2018   PHQ-2 Score: 0      Reviewed and updated as needed this visit by Provider  Allergies  Meds  Problems        Past Medical History:   Diagnosis Date     ASCUS with positive high risk HPV 11/2015     Gynecological examination      S/P LEEP of cervix 1991    has been >20 yrs since LEEP, no abnls noted, paps moved to Q3 yrs     Supervision of normal first pregnancy      Vaginitis and vulvovaginitis, unspecified       Past Surgical History:   Procedure Laterality Date     ESOPHAGOSCOPY, GASTROSCOPY, DUODENOSCOPY (EGD), COMBINED N/A 1/26/2017    Procedure: COMBINED ESOPHAGOSCOPY, GASTROSCOPY, DUODENOSCOPY (EGD);  Surgeon: Damian Ames MD;  Location: [facility]     LEEP TX, CERVICAL  1991    LEEP TX Cervical     MAMMOPLASTY REDUCTION BILATERAL Bilateral 5/22/2017  "   Procedure: MAMMOPLASTY REDUCTION BILATERAL;  Bilateral Breast Reduction Mammoplasty;  Surgeon: Franny Reynolds MD;  Location: [facility]     SURGICAL HISTORY OF -       Ear surgery     SURGICAL HISTORY OF -       Foot surgery     Obstetric History       T2      L2     SAB0   TAB0   Ectopic0   Multiple0   Live Births2       # Outcome Date GA Lbr Herve/2nd Weight Sex Delivery Anes PTL Lv   4  07 38w6d 02:48 7 lb 1 oz (3.204 kg) F    FREDRICK      Name: Cande      Apgar1:  8                Apgar5: 9   3  05 39w0d 03:07 6 lb 15 oz (3.147 kg) F IVD EPIDURAL  FREDRICK      Name: Edith Pearson       Apgar1:  9                Apgar5: 9   2 Term            1 Term                   Review of Systems   Constitutional: Negative for chills and fever.   HENT: Positive for ear pain. Negative for congestion, hearing loss and sore throat.    Eyes: Negative for pain and visual disturbance.   Respiratory: Negative for cough and shortness of breath.    Cardiovascular: Negative for chest pain, palpitations and peripheral edema.   Gastrointestinal: Positive for heartburn. Negative for abdominal pain, constipation, diarrhea, hematochezia and nausea.   Breasts:  Negative for tenderness, breast mass and discharge.   Genitourinary: Negative for dysuria, frequency, genital sores, hematuria, pelvic pain, urgency, vaginal bleeding and vaginal discharge.   Musculoskeletal: Negative for arthralgias, joint swelling and myalgias.   Skin: Negative for rash.   Neurological: Negative for dizziness, weakness, headaches and paresthesias.   Psychiatric/Behavioral: Negative for mood changes. The patient is not nervous/anxious.         OBJECTIVE:   /80 (BP Location: Right arm)  Pulse 71  Temp 97.8  F (36.6  C) (Tympanic)  Resp 16  Ht 5' 6\" (1.676 m)  Wt 237 lb (107.5 kg)  LMP 10/10/2018  SpO2 97%  BMI 38.25 kg/m2  Physical Exam  GENERAL: healthy, alert and no distress  EYES: Eyes grossly normal to " inspection, PERRL and conjunctivae and sclerae normal  HENT: ear canals and TM's normal, nose and mouth without ulcers or lesions  NECK: no adenopathy, no asymmetry, masses, or scars and thyroid normal to palpation  RESP: lungs clear to auscultation - no rales, rhonchi or wheezes  BREAST: normal without masses, tenderness or nipple discharge and no palpable axillary masses or adenopathy, well healed surgical scars present  CV: regular rate and rhythm, normal S1 S2, no S3 or S4, no murmur, click or rub, no peripheral edema and peripheral pulses strong  ABDOMEN: soft, nontender, no hepatosplenomegaly, no masses and bowel sounds normal  MS: no gross musculoskeletal defects noted, no edema  SKIN: left elbow 1.3 cm raised pink dome lesion with adherent white plaque  NEURO: Normal strength and tone, mentation intact and speech normal  PSYCH: mentation appears normal, affect normal/bright      ASSESSMENT/PLAN:   Noe was seen today for physical, hypertension and flu shot.    Diagnoses and all orders for this visit:    Routine general medical examination at a health care facility    Hypertension goal BP (blood pressure) < 140/90  -     losartan (COZAAR) 50 MG tablet; Take 1 tablet (50 mg) by mouth daily  -     Comprehensive metabolic panel    Morbid obesity (H)    CARDIOVASCULAR SCREENING; LDL GOAL LESS THAN 160  -     Lipid panel reflex to direct LDL Fasting    Skin lesion  -     triamcinolone (KENALOG) 0.1 % cream; Apply sparingly to affected area three times daily for 14 days.  ? Dermatitis vs psoriasis  If steroid cream does not resolve it, consider biopsy     Need for prophylactic vaccination and inoculation against influenza  -     FLU VACCINE, SPLIT VIRUS, IM (QUADRIVALENT) [29178]- >3 YRS  -     Vaccine Administration, Initial [72264]        COUNSELING:  Reviewed preventive health counseling, as reflected in patient instructions  Special attention given to:        Regular exercise    BP Readings from Last 1  "Encounters:   10/25/18 126/80     Estimated body mass index is 38.25 kg/(m^2) as calculated from the following:    Height as of this encounter: 5' 6\" (1.676 m).    Weight as of this encounter: 237 lb (107.5 kg).      Weight management plan: Discussed healthy diet and exercise guidelines and patient will follow up in 12 months in clinic to re-evaluate.     reports that she has never smoked. She has never used smokeless tobacco.      Counseling Resources:  ATP IV Guidelines  Pooled Cohorts Equation Calculator  Breast Cancer Risk Calculator  FRAX Risk Assessment  ICSI Preventive Guidelines  Dietary Guidelines for Americans, 2010  USDA's MyPlate  ASA Prophylaxis  Lung CA Screening    Abril Bello MD  [facility]  "

## 2018-10-25 NOTE — NURSING NOTE
"Chief Complaint   Patient presents with     Physical     yearly     Hypertension     refill meds       Initial /80 (BP Location: Right arm)  Pulse 71  Temp 97.8  F (36.6  C) (Tympanic)  Resp 16  Ht 5' 6\" (1.676 m)  Wt 237 lb (107.5 kg)  LMP 10/10/2018  SpO2 97%  BMI 38.25 kg/m2 Estimated body mass index is 38.25 kg/(m^2) as calculated from the following:    Height as of this encounter: 5' 6\" (1.676 m).    Weight as of this encounter: 237 lb (107.5 kg).    Patient presents to the clinic using No DME    Health Maintenance that is potentially due pending provider review:  NONE    n/a    Is there anyone who you would like to be able to receive your results? Yes  If yes have patient fill out NATACHA    "

## 2018-10-25 NOTE — MR AVS SNAPSHOT
After Visit Summary   10/25/2018    Noe Fitzgerald    MRN: [id number]           Patient Information     Date Of Birth          1970        Visit Information        Provider Department      10/25/2018 8:00 AM Abril Bello MD [facility]        Today's Diagnoses     Routine general medical examination at a health care facility    -  1    Hypertension goal BP (blood pressure) < 140/90        Morbid obesity (H)        CARDIOVASCULAR SCREENING; LDL GOAL LESS THAN 160          Care Instructions    Stop the lisinopril and start losartan  Monitor blood pressures   Have them send them to me    Preventive Health Recommendations  Female Ages 40 to 49    Yearly exam:     See your health care provider every year in order to  1. Review health changes.   2. Discuss preventive care.    3. Review your medicines if your doctor prescribed any.      Get a Pap test every three years (unless you have an abnormal result and your provider advises testing more often).      If you get Pap tests with HPV test, you only need to test every 5 years, unless you have an abnormal result. You do not need a Pap test if your uterus was removed (hysterectomy) and you have not had cancer.      You should be tested each year for STDs (sexually transmitted diseases), if you're at risk.     Ask your doctor if you should have a mammogram.      Have a colonoscopy (test for colon cancer) if someone in your family has had colon cancer or polyps before age 50.       Have a cholesterol test every 5 years.       Have a diabetes test (fasting glucose) after age 45. If you are at risk for diabetes, you should have this test every 3 years.    Shots: Get a flu shot each year. Get a tetanus shot every 10 years.     Nutrition:     Eat at least 5 servings of fruits and vegetables each day.    Eat whole-grain bread, whole-wheat pasta and brown rice instead of white grains and rice.    Get adequate Calcium and Vitamin  "D.      Lifestyle    Exercise at least 150 minutes a week (an average of 30 minutes a day, 5 days a week). This will help you control your weight and prevent disease.    Limit alcohol to one drink per day.    No smoking.     Wear sunscreen to prevent skin cancer.    See your dentist every six months for an exam and cleaning.          Follow-ups after your visit        Follow-up notes from your care team     Return in about 1 month (around 11/25/2018).      Who to contact     If you have questions or need follow up information about today's clinic visit or your schedule please contact [facility] directly at [phone].  Normal or non-critical lab and imaging results will be communicated to you by Telismahart, letter or phone within 4 business days after the clinic has received the results. If you do not hear from us within 7 days, please contact the clinic through Seedf[employer] or phone. If you have a critical or abnormal lab result, we will notify you by phone as soon as possible.  Submit refill requests through [employer] or call your pharmacy and they will forward the refill request to us. Please allow 3 business days for your refill to be completed.          Additional Information About Your Visit        [facility] Information     [employer] gives you secure access to your electronic health record. If you see a primary care provider, you can also send messages to your care team and make appointments. If you have questions, please call your primary care clinic.  If you do not have a primary care provider, please call [phone] and they will assist you.        Care EveryWhere ID     This is your Care EveryWhere ID. This could be used by other organizations to access your [facility] medical records  [id number]        Your Vitals Were     Pulse Temperature Respirations Height Last Period Pulse Oximetry    71 97.8  F (36.6  C) (Tympanic) 16 5' 6\" (1.676 m) 10/10/2018 97%    BMI (Body Mass Index)                   " 38.25 kg/m2            Blood Pressure from Last 3 Encounters:   10/25/18 126/80   06/20/17 (!) 149/104   05/26/17 (!) 144/100    Weight from Last 3 Encounters:   10/25/18 237 lb (107.5 kg)   06/20/17 226 lb 8 oz (102.7 kg)   05/26/17 226 lb (102.5 kg)              We Performed the Following     Comprehensive metabolic panel     Lipid panel reflex to direct LDL Fasting          Today's Medication Changes          These changes are accurate as of 10/25/18  8:42 AM.  If you have any questions, ask your nurse or doctor.               Start taking these medicines.        Dose/Directions    losartan 50 MG tablet   Commonly known as:  COZAAR   Used for:  Hypertension goal BP (blood pressure) < 140/90   Replaces:  lisinopril 20 MG tablet   Started by:  Abril Belol MD        Dose:  50 mg   Take 1 tablet (50 mg) by mouth daily   Quantity:  90 tablet   Refills:  3         Stop taking these medicines if you haven't already. Please contact your care team if you have questions.     cyclobenzaprine 5 MG tablet   Commonly known as:  FLEXERIL   Stopped by:  Abril Bello MD           lisinopril 20 MG tablet   Commonly known as:  PRINIVIL/ZESTRIL   Replaced by:  losartan 50 MG tablet   Stopped by:  Abril Bello MD           omeprazole 20 MG CR capsule   Commonly known as:  priLOSEC   Stopped by:  Abril Bello MD                Where to get your medicines      These medications were sent to [facility] [address] [place] [address] [address] [place] [address]  [address] [facility] [address]     Phone:  [phone]     losartan 50 MG tablet                Primary Care Provider Office Phone # Fax #    Abril Bello [phone]       [address] [place] [address]        Equal Access to Services     GOLDEN WADDELL AH: Hadii matt hallo Sobekah, waaxda luqadaha, qaybta kaalmada adeegyada, david guillen. So [facility] [phone].    ATENCIÓN: Si gricelda manzano gaytan  disposición servicios gratuitos de asistencia lingüística. Doyle lackey [phone].    We comply with applicable federal civil rights laws and [place] laws. We do not discriminate on the basis of race, color, national origin, age, disability, sex, sexual orientation, or gender identity.            Thank you!     Thank you for choosing [facility]  for your care. Our goal is always to provide you with excellent care. Hearing back from our patients is one way we can continue to improve our services. Please take a few minutes to complete the written survey that you may receive in the mail after your visit with us. Thank you!             Your Updated Medication List - Protect others around you: Learn how to safely use, store and throw away your medicines at www.disposemymeds.org.          This list is accurate as of 10/25/18  8:42 AM.  Always use your most recent med list.                   Brand Name Dispense Instructions for use Diagnosis    losartan 50 MG tablet    COZAAR    90 tablet    Take 1 tablet (50 mg) by mouth daily    Hypertension goal BP (blood pressure) < 140/90       metoprolol succinate 25 MG 24 hr tablet    TOPROL-XL    90 tablet    TAKE ONE TABLET BY MOUTH ONCE DAILY    Ascending aorta dilatation (H)

## 2018-10-25 NOTE — PROGRESS NOTES

## 2018-10-25 NOTE — PATIENT INSTRUCTIONS
Stop the lisinopril and start losartan  Monitor blood pressures   Have them send them to me    Preventive Health Recommendations  Female Ages 40 to 49    Yearly exam:     See your health care provider every year in order to  1. Review health changes.   2. Discuss preventive care.    3. Review your medicines if your doctor prescribed any.      Get a Pap test every three years (unless you have an abnormal result and your provider advises testing more often).      If you get Pap tests with HPV test, you only need to test every 5 years, unless you have an abnormal result. You do not need a Pap test if your uterus was removed (hysterectomy) and you have not had cancer.      You should be tested each year for STDs (sexually transmitted diseases), if you're at risk.     Ask your doctor if you should have a mammogram.      Have a colonoscopy (test for colon cancer) if someone in your family has had colon cancer or polyps before age 50.       Have a cholesterol test every 5 years.       Have a diabetes test (fasting glucose) after age 45. If you are at risk for diabetes, you should have this test every 3 years.    Shots: Get a flu shot each year. Get a tetanus shot every 10 years.     Nutrition:     Eat at least 5 servings of fruits and vegetables each day.    Eat whole-grain bread, whole-wheat pasta and brown rice instead of white grains and rice.    Get adequate Calcium and Vitamin D.      Lifestyle    Exercise at least 150 minutes a week (an average of 30 minutes a day, 5 days a week). This will help you control your weight and prevent disease.    Limit alcohol to one drink per day.    No smoking.     Wear sunscreen to prevent skin cancer.    See your dentist every six months for an exam and cleaning.

## 2018-11-14 ENCOUNTER — TELEPHONE (OUTPATIENT)
Dept: FAMILY MEDICINE | Facility: CLINIC | Age: 48
End: 2018-11-14

## 2018-11-14 NOTE — TELEPHONE ENCOUNTER
Reason for call:  Patient reporting a symptom    Symptom or request: Noe asking to talk to a nurse. States she is having problems with her periods and clots.     Phone Number patient can be reached at:  Home number on file [phone] (home)    Best Time:  anytime    Can we leave a detailed message on this number:  YES    Call taken on 11/14/2018 at 12:51 PM by Lois Chan

## 2018-11-14 NOTE — TELEPHONE ENCOUNTER
Pt called. States she got her period yesterday and she is having heavy bleeding and clots larger than 1/2 dollar. States she was having cramping as well, but controlled. States she has went through 1 pad this am. Appointment made for today. Dasha Choe RN

## 2018-11-17 ENCOUNTER — MYC MEDICAL ADVICE (OUTPATIENT)
Dept: FAMILY MEDICINE | Facility: CLINIC | Age: 48
End: 2018-11-17

## 2018-11-17 ENCOUNTER — NURSE TRIAGE (OUTPATIENT)
Dept: NURSING | Facility: CLINIC | Age: 48
End: 2018-11-17

## 2018-11-17 ENCOUNTER — HOSPITAL ENCOUNTER (EMERGENCY)
Facility: CLINIC | Age: 48
Discharge: HOME | End: 2018-11-17
Attending: EMERGENCY MEDICINE | Admitting: EMERGENCY MEDICINE
Payer: COMMERCIAL

## 2018-11-17 ENCOUNTER — APPOINTMENT (OUTPATIENT)
Dept: ULTRASOUND IMAGING | Facility: CLINIC | Age: 48
End: 2018-11-17
Attending: EMERGENCY MEDICINE
Payer: COMMERCIAL

## 2018-11-17 VITALS
RESPIRATION RATE: 16 BRPM | SYSTOLIC BLOOD PRESSURE: 150 MMHG | TEMPERATURE: 97.9 F | DIASTOLIC BLOOD PRESSURE: 88 MMHG | OXYGEN SATURATION: 98 %

## 2018-11-17 DIAGNOSIS — I10 HYPERTENSION GOAL BP (BLOOD PRESSURE) < 140/90: ICD-10-CM

## 2018-11-17 DIAGNOSIS — R42 LIGHTHEADEDNESS: ICD-10-CM

## 2018-11-17 DIAGNOSIS — N93.9 EPISODE OF HEAVY VAGINAL BLEEDING: ICD-10-CM

## 2018-11-17 LAB
ABO + RH BLD: NORMAL
ABO + RH BLD: NORMAL
ALBUMIN UR-MCNC: NEGATIVE MG/DL
APPEARANCE UR: CLEAR
BACTERIA #/AREA URNS HPF: ABNORMAL /HPF
BASOPHILS # BLD AUTO: 0 10E9/L (ref 0–0.2)
BASOPHILS NFR BLD AUTO: 0.6 %
BILIRUB UR QL STRIP: NEGATIVE
BLD GP AB SCN SERPL QL: NORMAL
BLOOD BANK CMNT PATIENT-IMP: NORMAL
COLOR UR AUTO: ABNORMAL
DIFFERENTIAL METHOD BLD: NORMAL
EOSINOPHIL # BLD AUTO: 0.2 10E9/L (ref 0–0.7)
EOSINOPHIL NFR BLD AUTO: 4 %
ERYTHROCYTE [DISTWIDTH] IN BLOOD BY AUTOMATED COUNT: 13.3 % (ref 10–15)
GLUCOSE UR STRIP-MCNC: NEGATIVE MG/DL
HCG UR QL: NEGATIVE
HCT VFR BLD AUTO: 38.4 % (ref 35–47)
HGB BLD-MCNC: 12.5 G/DL (ref 11.7–15.7)
HGB UR QL STRIP: ABNORMAL
IMM GRANULOCYTES # BLD: 0 10E9/L (ref 0–0.4)
IMM GRANULOCYTES NFR BLD: 0.2 %
KETONES UR STRIP-MCNC: NEGATIVE MG/DL
LEUKOCYTE ESTERASE UR QL STRIP: NEGATIVE
LYMPHOCYTES # BLD AUTO: 1.9 10E9/L (ref 0.8–5.3)
LYMPHOCYTES NFR BLD AUTO: 37.4 %
MCH RBC QN AUTO: 29.4 PG (ref 26.5–33)
MCHC RBC AUTO-ENTMCNC: 32.6 G/DL (ref 31.5–36.5)
MCV RBC AUTO: 90 FL (ref 78–100)
MONOCYTES # BLD AUTO: 0.4 10E9/L (ref 0–1.3)
MONOCYTES NFR BLD AUTO: 7.3 %
NEUTROPHILS # BLD AUTO: 2.6 10E9/L (ref 1.6–8.3)
NEUTROPHILS NFR BLD AUTO: 50.5 %
NITRATE UR QL: NEGATIVE
NRBC # BLD AUTO: 0 10*3/UL
NRBC BLD AUTO-RTO: 0 /100
PH UR STRIP: 7 PH (ref 5–7)
PLATELET # BLD AUTO: 237 10E9/L (ref 150–450)
RBC # BLD AUTO: 4.25 10E12/L (ref 3.8–5.2)
RBC #/AREA URNS AUTO: <1 /HPF (ref 0–2)
SOURCE: ABNORMAL
SP GR UR STRIP: 1 (ref 1–1.03)
SPECIMEN EXP DATE BLD: NORMAL
SQUAMOUS #/AREA URNS AUTO: <1 /HPF (ref 0–1)
UROBILINOGEN UR STRIP-MCNC: 0 MG/DL (ref 0–2)
WBC # BLD AUTO: 5.1 10E9/L (ref 4–11)
WBC #/AREA URNS AUTO: 1 /HPF (ref 0–5)

## 2018-11-17 PROCEDURE — 99284 EMERGENCY DEPT VISIT MOD MDM: CPT | Mod: Z6 | Performed by: EMERGENCY MEDICINE

## 2018-11-17 PROCEDURE — 81001 URINALYSIS AUTO W/SCOPE: CPT | Performed by: EMERGENCY MEDICINE

## 2018-11-17 PROCEDURE — 81025 URINE PREGNANCY TEST: CPT | Performed by: EMERGENCY MEDICINE

## 2018-11-17 PROCEDURE — 85025 COMPLETE CBC W/AUTO DIFF WBC: CPT | Performed by: EMERGENCY MEDICINE

## 2018-11-17 PROCEDURE — 86850 RBC ANTIBODY SCREEN: CPT | Performed by: EMERGENCY MEDICINE

## 2018-11-17 PROCEDURE — 76856 US EXAM PELVIC COMPLETE: CPT

## 2018-11-17 PROCEDURE — 99284 EMERGENCY DEPT VISIT MOD MDM: CPT | Mod: 25

## 2018-11-17 PROCEDURE — 86900 BLOOD TYPING SEROLOGIC ABO: CPT | Performed by: EMERGENCY MEDICINE

## 2018-11-17 PROCEDURE — 86901 BLOOD TYPING SEROLOGIC RH(D): CPT | Performed by: EMERGENCY MEDICINE

## 2018-11-17 RX ORDER — LISINOPRIL 20 MG/1
20 TABLET ORAL DAILY
COMMUNITY
Start: 2018-10-17 | End: 2018-11-17

## 2018-11-17 ASSESSMENT — ENCOUNTER SYMPTOMS
PSYCHIATRIC NEGATIVE: 1
ALLERGIC/IMMUNOLOGIC NEGATIVE: 1
MUSCULOSKELETAL NEGATIVE: 1
EYES NEGATIVE: 1
CARDIOVASCULAR NEGATIVE: 1
CONSTITUTIONAL NEGATIVE: 1
RESPIRATORY NEGATIVE: 1
LIGHT-HEADEDNESS: 1
GASTROINTESTINAL NEGATIVE: 1
HEMATOLOGIC/LYMPHATIC NEGATIVE: 1
ENDOCRINE NEGATIVE: 1

## 2018-11-17 NOTE — ED AVS SNAPSHOT
[facility] Emergency Department    [address] [address]    Phone:  [phone]    Fax:  [phone]                                       Noe Fitzgerald   MRN: [id number]    Department:  [facility] Emergency Department   Date of Visit:  11/17/2018           Patient Information     Date Of Birth          1970        Your diagnoses for this visit were:     Lightheadedness may be related to medication changes    Episode of heavy vaginal bleeding With report of passage of large clots over 48 hours.       You were seen by Osito Vilchis MD.        Discharge Instructions       Follow up with primary doctor.   Monitor blood pressures at home .  Take the losartan in the evening.          Dizziness (Uncertain Cause)  Dizziness is a common symptom. It may be described as lightheadedness, spinning, or feeling like you are going to faint. Dizziness can have many causes.  Be sure to tell the healthcare provider about:    All medicines you take, including prescription, over-the-counter, herbs, and supplements    Any other symptoms you have    Any health problems you are being treated for    Any past major health problems you've had, such as a heart attack, balance issues, hearing problems, or blood pressure problems    Anything that causes the dizziness to get worse or better  Today's exam did not show an exact cause for your dizziness. Other tests may be needed. Follow up with your healthcare provider.  Home care    Dizziness that occurs with sudden standing may be a sign of mild dehydration. Drink extra fluids for the next few days.    If you recently started a new medicine, stopped a medicine, or had the dose of a current medicine changed, talk with the prescribing healthcare provider. Your medicine plan may need adjustment.    If dizziness lasts more than a few seconds, sit or lie down until it passes. This may help prevent injury in case you pass out. Get up slowly when you  feel better.    Don't drive or use power tools or dangerous equipment until you have had no dizziness for at least 48 hours.  Follow-up care  Follow up with your healthcare provider for further evaluation within the next 7 days or as advised.  When to seek medical advice  Call your healthcare provider for any of the following:    Worsening of symptoms or new symptoms    Passing out or seizure    Repeated vomiting    Headache    Palpitations (the sense that your heart is fluttering or beating fast or hard)    Shortness of breath    Blood in vomit or stool (black or red color)    Weakness of an arm or leg or 1 side of the face    Vision or hearing changes    Trouble walking or speaking    Chest, arm, neck, back, or jaw pain  Date Last Reviewed: 11/1/2017 2000-2018 [employer]. [address] [place] [address], [place] [id number]. All rights reserved. This information is not intended as a substitute for professional medical care. Always follow your healthcare professional's instructions.        Established High Blood Pressure    High blood pressure (hypertension) is a chronic disease. Often, healthcare providers don t know what causes it. But it can be caused by certain health conditions and medicines.  If you have high blood pressure, you may not have any symptoms. If you do have symptoms, they may include headache, dizziness, changes in your vision, chest pain, and shortness of breath. But even without symptoms, high blood pressure that s not treated raises your risk for heart attack, heart failure, and stroke. High blood pressure is a serious health risk and shouldn t be ignored.  Blood pressure measurements are given as 2 numbers. Systolic blood pressure is the upper number. This is the pressure when the heart contracts. Diastolic blood pressure is the lower number. This is the pressure when the heart relaxes between beats. You will see your blood pressure readings written together. For example, a person with  a systolic pressure of 118 and a diastolic pressure of 78 will have 118/78 written in the medical record.  Blood pressure is categorized as normal, elevated, or stage 1 or stage 2 high blood pressure:    Normal blood pressure is systolic of less than 120 and diastolic of less than 80 (120/80)    Elevated blood pressure is systolic of 120 to 129 and diastolic less than 80    Stage 1 high blood pressure is systolic is 130 to 139 or diastolic between 80 to 89    Stage 2 high blood pressure is when systolic is 140 or higher or the diastolic is 90 or higher  Home care  If you have high blood pressure, follow these home care guidelines to help lower your blood pressure. If you are taking medicines for high blood pressure, these methods may reduce or end your need for medicines in the future.    Start a weight-loss program if you are overweight.    Cut back on how much salt you get in your diet. Here s how to do this:  ? Don t eat foods that have a lot of salt. These include olives, pickles, smoked meats, and salted potato chips.  ? Don t add salt to your food at the table.  ? Use only small amounts of salt when cooking.    Start an exercise program. Talk with your healthcare provider about the type of exercise program that would be best for you. It doesn't have to be hard. Even brisk walking for 20 minutes 3 times a week is a good form of exercise.    Don t take medicines that stimulate the heart. This includes many over-the-counter cold and sinus decongestant pills and sprays, as well as diet pills. Check the warnings about high blood pressure on the label. Before buying any over-the-counter medicines or supplements, always ask the pharmacist about the product's potential interaction with your high blood pressure and your high blood pressure medicines.    Stimulants such as amphetamine or cocaine could be deadly for someone with high blood pressure. Never take these.    Limit how much caffeine you get in your diet.  Switch to caffeine-free products.    Stop smoking. If you are a long-time smoker, this can be hard. Talk to your healthcare provider about medicines and nicotine replacement options to help you. Also, enroll in a stop-smoking program to make it more likely that you will quit for good.    Learn how to handle stress. This is an important part of any program to lower blood pressure. Learn about relaxation methods like meditation, yoga, or biofeedback.    If your provider prescribed medicines, take them exactly as directed. Missing doses may cause your blood pressure get out of control.    If you miss a dose or doses, check with your healthcare provider or pharmacist about what to do.    Consider buying an automatic blood pressure machine to check your blood pressure at home. Ask your provider for a recommendation. You can get one of these at most pharmacies.     The American Heart Association recommends the following guidelines for home blood pressure monitoring:    Don't smoke or drink coffee for 30 minutes before taking your blood pressure.    Go to the bathroom before the test.    Relax for 5 minutes before taking the measurement.    Sit with your back supported (don't sit on a couch or soft chair); keep your feet on the floor uncrossed. Place your arm on a solid flat surface (like a table) with the upper part of the arm at heart level. Place the middle of the cuff directly above the bend of the elbow. Check the monitor's instruction manual for an illustration.    Take multiple readings. When you measure, take 2 to 3 readings one minute apart and record all of the results.    Take your blood pressure at the same time every day, or as your healthcare provider recommends.    Record the date, time, and blood pressure reading.    Take the record with you to your next medical appointment. If your blood pressure monitor has a built-in memory, simply take the monitor with you to your next appointment.    Call your provider  if you have several high readings. Don't be frightened by a single high blood pressure reading, but if you get several high readings, check in with your healthcare provider.    Note: When blood pressure reaches a systolic (top number) of 180 or higher OR diastolic (bottom number) of 110 or higher, seek emergency medical treatment.  Follow-up care  You will need to see your healthcare provider regularly. This is to check your blood pressure and to make changes to your medicines. Make a follow-up appointment as directed. Bring the record of your home blood pressure readings to the appointment.  When to seek medical advice  Call your healthcare provider right away if any of these occur:    Blood pressure reaches a systolic (upper number) of 180 or higher OR a diastolic (bottom number) of 110 or higher    Chest pain or shortness of breath    Severe headache    Throbbing or rushing sound in the ears    Nosebleed    Sudden severe pain in your belly (abdomen)    Extreme drowsiness, confusion, or fainting    Dizziness or spinning sensation (vertigo)    Weakness of an arm or leg or one side of the face    You have problems speaking or seeing   Date Last Reviewed: 12/1/2016 2000-2018 [employer]. [address] [place] [address]. All rights reserved. This information is not intended as a substitute for professional medical care. Always follow your healthcare professional's instructions.          24 Hour Appointment Hotline       To make an appointment at any [facility], call [phone] ([phone]). If you don't have a family doctor or clinic, we will help you find one. [facility] clinics are conveniently located to serve the needs of you and your family.             Review of your medicines      Our records show that you are taking the medicines listed below. If these are incorrect, please call your family doctor or clinic.        Dose / Directions Last dose taken    losartan 50 MG tablet    Commonly known as:  COZAAR   Dose:  50 mg   Quantity:  90 tablet        Take 1 tablet (50 mg) by mouth daily   Refills:  3        metoprolol succinate 25 MG 24 hr tablet   Commonly known as:  TOPROL-XL   Quantity:  90 tablet        TAKE ONE TABLET BY MOUTH ONCE DAILY   Refills:  3        ranitidine 150 MG capsule   Commonly known as:  ZANTAC   Dose:  150 mg        Take 150 mg by mouth daily as needed for heartburn   Refills:  0                Procedures and tests performed during your visit     ABO/Rh type and screen    CBC with platelets differential    HCG qualitative urine    Orthostatic blood pressure and pulse    Pelvis w/Transvaginal    UA with Microscopic      Orders Needing Specimen Collection     None      Pending Results     Date and Time Order Name Status Description    11/17/2018 1515 Pelvis w/Transvaginal Preliminary     11/17/2018 1514 ABO/Rh type and screen In process             Pending Culture Results     No orders found from 11/15/2018 to 11/18/2018.            Pending Results Instructions     If you had any lab results that were not finalized at the time of your Discharge, you can call the ED Lab Result RN at [phone]. You will be contacted by this team for any positive Lab results or changes in treatment. The nurses are available 7 days a week from 10A to 6:30P.  You can leave a message 24 hours per day and they will return your call.        Test Results From Your Hospital Stay        11/17/2018  3:09 PM      Component Results     Component Value Ref Range & Units Status    Color Urine Straw  Final    Appearance Urine Clear  Final    Glucose Urine Negative NEG^Negative mg/dL Final    Bilirubin Urine Negative NEG^Negative Final    Ketones Urine Negative NEG^Negative mg/dL Final    Specific Gravity Urine 1.004 1.003 - 1.035 Final    Blood Urine Large (A) NEG^Negative Final    pH Urine 7.0 5.0 - 7.0 pH Final    Protein Albumin Urine Negative NEG^Negative mg/dL Final    Urobilinogen mg/dL 0.0  0.0 - 2.0 mg/dL Final    Nitrite Urine Negative NEG^Negative Final    Leukocyte Esterase Urine Negative NEG^Negative Final    Source Midstream Urine  Final    WBC Urine 1 0 - 5 /HPF Final    RBC Urine <1 0 - 2 /HPF Final    Bacteria Urine Few (A) NEG^Negative /HPF Final    Squamous Epithelial /HPF Urine <1 0 - 1 /HPF Final         11/17/2018  3:13 PM      Component Results     Component Value Ref Range & Units Status    HCG Qual Urine Negative NEG^Negative Final    This test is for screening purposes.  Results should be interpreted along with   the clinical picture.  Confirmation testing is available if warranted by   ordering [id number], HCG Quantitative Pregnancy.           11/17/2018  3:39 PM      Component Results     Component Value Ref Range & Units Status    WBC 5.1 4.0 - 11.0 10e9/L Final    RBC Count 4.25 3.8 - 5.2 10e12/L Final    Hemoglobin 12.5 11.7 - 15.7 g/dL Final    Hematocrit 38.4 35.0 - 47.0 % Final    MCV 90 78 - 100 fl Final    MCH 29.4 26.5 - 33.0 pg Final    MCHC 32.6 31.5 - 36.5 g/dL Final    RDW 13.3 10.0 - 15.0 % Final    Platelet Count 237 150 - 450 10e9/L Final    Diff Method Automated Method  Final    % Neutrophils 50.5 % Final    % Lymphocytes 37.4 % Final    % Monocytes 7.3 % Final    % Eosinophils 4.0 % Final    % Basophils 0.6 % Final    % Immature Granulocytes 0.2 % Final    Nucleated RBCs 0 0 /100 Final    Absolute Neutrophil 2.6 1.6 - 8.3 10e9/L Final    Absolute Lymphocytes 1.9 0.8 - 5.3 10e9/L Final    Absolute Monocytes 0.4 0.0 - 1.3 10e9/L Final    Absolute Eosinophils 0.2 0.0 - 0.7 10e9/L Final    Absolute Basophils 0.0 0.0 - 0.2 10e9/L Final    Abs Immature Granulocytes 0.0 0 - 0.4 10e9/L Final    Absolute Nucleated RBC 0.0  Final         11/17/2018  3:32 PM         11/17/2018  4:59 PM      Narrative     ULTRASOUND PELVIS WITH TRANSVAGINAL IMAGING  11/17/2018 4:43 PM     HISTORY: Heavy vaginal bleeding with passage of clots 48 hours ago.   Perimenopausal.  Evaluate for fibroids  versus polyps versus other  acute  process.     COMPARISON: None. May 16, 2013    FINDINGS:  Transvaginal images were performed to better evaluate the  patient's uterus, ovaries and endometrial stripe.    1.4 x 1.0 x 1.3 cm subserosal anterior fundal fibroid. The uterus is  10.7 x 4.3 x 5.9. Endometrial stripe measures 10 mm and is normal for  patient's age and menstrual status. The right ovary is normal. The  left ovary is normal.  No adnexal masses are present. No free pelvic  fluid is present.        Impression     IMPRESSION: Fibroid uterus, no definite endometrial abnormality  demonstrated for age and menstrual status.                Thank you for choosing [facility]       Thank you for choosing [facility] for your care. Our goal is always to provide you with excellent care. Hearing back from our patients is one way we can continue to improve our services. Please take a few minutes to complete the written survey that you may receive in the mail after you visit with us. Thank you!        [employer] Information     [employer] gives you secure access to your electronic health record. If you see a primary care provider, you can also send messages to your care team and make appointments. If you have questions, please call your primary care clinic.  If you do not have a primary care provider, please call [phone] and they will assist you.        Care EveryWhere ID     This is your Care EveryWhere ID. This could be used by other organizations to access your [facility] medical records  [id number]        Equal Access to Services     GOLDEN WADDELL AH: Hadeliezer Butts, waaxda alecia, qaybta kaaldavid chun. So [facility] [place] [phone].    ATENCIÓN: Si habla español, tiene a gaytan disposición servicios gratuitos de asistencia lingüística. Llame al [phone].    We comply with applicable federal civil rights laws and [place] laws. We do not discriminate on the basis of race,  color, national origin, age, disability, sex, sexual orientation, or gender identity.            After Visit Summary       This is your record. Keep this with you and show to your community pharmacist(s) and doctor(s) at your next visit.

## 2018-11-17 NOTE — ED AVS SNAPSHOT
[facility] Emergency Department    [address] [facility] [address]    Phone:  [phone]    Fax:  [phone]                                       Noe Fitzgerald   MRN: [id number]    Department:  [facility] Emergency Department   Date of Visit:  11/17/2018           After Visit Summary Signature Page     I have received my discharge instructions, and my questions have been answered. I have discussed any challenges I see with this plan with the nurse or doctor.    ..........................................................................................................................................  Patient/Patient Representative Signature      ..........................................................................................................................................  Patient Representative Print Name and Relationship to Patient    ..................................................               ................................................  Date                                   Time    ..........................................................................................................................................  Reviewed by Signature/Title    ...................................................              ..............................................  Date                                               Time          22EPIC Rev 08/18

## 2018-11-17 NOTE — ED PROVIDER NOTES
"  History     Chief Complaint   Patient presents with     Dizziness     lightheaded and weak for several days, recent menses with large clot     HPI  Noe Fitzgerald is a 48 year old female with a history significant for ASCUS with positive cervical HPV, ascending aorta dilatation, hypertension, and obesity who presents to the ED for evaluation of menses concerns and lightheadedness. Three days ago, the patient began her menstrual period and began passing golf-ball to fist-sized blood clots each time she used the bathroom. She also notes experiencing a cramping pain in her lower abdomen. The cramping pain and blood clots continued through the entirety of the next day. At that time, the patient called the nurse line and was advised to be seen in the ED for further evaluation, but she waited a few days. Yesterday and today, the patient reports that there has been a drastic change in her menstrual flow and that she is \"barely bleeding at all\". The patient believes that she is \"pre-menopausal\"; her menstrual periods typically lasted 5-7 days each month, but now she has been getting a period almost every other week.  The patient has a history of heavy vaginal bleeding after giving birth to her second daughter 11 years ago. Her most recent pap smear was done last year. The patient also notes developed worsening lightheadedness as well as \"cold sweats\" intermittently over the past few days, but initially thought these symptoms were due to passing blood clots. However, she is now concerned that the lightheadedness is related to a recent medication change.     The patient reports that she recently changed blood pressure medications from lisinopril to losartan at the beginning of this month. Two days ago, the patient was working at the [employer] when she began experiencing lightheadedness and severe shaking in her hands. The patient describes the lightheadedness as a feeling of unsteadiness and denies any vertigo. She asked " "the staffed EMT[facility] at [facility] to take her blood pressure, which was found to be 159/114. The patient was concerned as this number was significantly elevated compared to her typically controlled blood pressure, so she was seen at a walk-in clinic later that day. In clinic, the patient's hemoglobin was tested and was found to be within normal limits. She was subsequently advised to \"double-up\" her regular dosage of losartan and metoprolol. She then measured her blood pressure again yesterday, and it was found to be 130/94. The patient notes that her blood pressure was significantly lower when measured at her physical examination at the end of 2018. She was advised to switch blood pressure medications at that time and no other medication changes were made. The patient also regularly takes metoprolol as a preventative measure because her mother  of a ruptured aortic aneurysm and stroke. The patient notes that she was screened with a CT scan, which found that she had an mild ascending aortic dilatation.    Social History: The patient lives in [place], [place]. She presents to the ED with her  and daughters via private car.      Problem List:    Patient Active Problem List    Diagnosis Date Noted     Obesity (BMI 35.0-39.9) with comorbidity (H) 10/25/2018     Priority: Medium     Papanicolaou smear of cervix with atypical squamous cells of undetermined significance (ASC-US) 2017     Priority: Medium     Ascending aorta dilatation (H) 2016     Priority: Medium     Per cardiology, put on beta blocker and recheck echocardiogram in one year       ASCUS with positive high risk HPV cervical 2015     Priority: Medium     2015:Pap--ASCUS, +HR HPV.   12/08/15 Colpo benign. Plan: co-test in 12 months.  17:Pap--NIL, neg HPV. Repeat Pap+HPV in 3 yrs. Due 2020       Hypertension goal BP (blood pressure) < 140/90 10/17/2012     Priority: Medium     CARDIOVASCULAR SCREENING; LDL GOAL LESS " THAN 160 10/31/2010     Priority: Medium     Large breasts 10/27/2008     Priority: Medium        Past Medical History:    Past Medical History:   Diagnosis Date     ASCUS with positive high risk HPV 11/2015     Gynecological examination      S/P LEEP of cervix 1991     Supervision of normal first pregnancy      Vaginitis and vulvovaginitis, unspecified        Past Surgical History:    Past Surgical History:   Procedure Laterality Date     ESOPHAGOSCOPY, GASTROSCOPY, DUODENOSCOPY (EGD), COMBINED N/A 1/26/2017    Procedure: COMBINED ESOPHAGOSCOPY, GASTROSCOPY, DUODENOSCOPY (EGD);  Surgeon: Damian Ames MD;  Location: [facility]     LEEP TX, CERVICAL  1991    LEEP TX Cervical     MAMMOPLASTY REDUCTION BILATERAL Bilateral 5/22/2017    Procedure: MAMMOPLASTY REDUCTION BILATERAL;  Bilateral Breast Reduction Mammoplasty;  Surgeon: Franny Reynolds MD;  Location:  OR     SURGICAL HISTORY OF -       Ear surgery     SURGICAL HISTORY OF -       Foot surgery       Family History:    Family History   Problem Relation Age of Onset     Hypertension Father        Social History:  Marital Status:   [2]  Social History   Substance Use Topics     Smoking status: Never Smoker     Smokeless tobacco: Never Used     Alcohol use No      Comment: rarely        Medications:      lisinopril (PRINIVIL/ZESTRIL) 20 MG tablet   losartan (COZAAR) 50 MG tablet   metoprolol succinate (TOPROL-XL) 25 MG 24 hr tablet   triamcinolone (KENALOG) 0.1 % cream         Review of Systems   Constitutional: Negative.    HENT: Negative.    Eyes: Negative.    Respiratory: Negative.    Cardiovascular: Negative.    Gastrointestinal: Negative.    Endocrine: Negative.    Genitourinary: Positive for vaginal bleeding.   Musculoskeletal: Negative.    Skin: Negative.    Allergic/Immunologic: Negative.    Neurological: Positive for light-headedness.   Hematological: Negative.    Psychiatric/Behavioral: Negative.    All other systems reviewed and are  negative.      Physical Exam   BP: (!) 178/133  Heart Rate: 82  Temp: 97.9  F (36.6  C)  Resp: 20  SpO2: 96 %  Lying Orthostatic BP: 146/100  Lying Orthostatic Pulse: 59 bpm  Sitting Orthostatic BP: 157/103  Sitting Orthostatic Pulse: 65 bpm  Standing Orthostatic BP: 151/100  Standing Orthostatic Pulse: 71 bpm      Physical Exam   Constitutional: She is oriented to person, place, and time. She appears well-developed and well-nourished. No distress.   HENT:   Head: Normocephalic and atraumatic.   Eyes: Conjunctivae and EOM are normal. Pupils are equal, round, and reactive to light. Right eye exhibits no discharge. Left eye exhibits no discharge. No scleral icterus.   Neck: Normal range of motion. Neck supple. No JVD present. No tracheal deviation present. No thyromegaly present.   Cardiovascular: Normal rate and regular rhythm.  Exam reveals no gallop and no friction rub.    No murmur heard.  Pulmonary/Chest: Effort normal and breath sounds normal. No stridor. No respiratory distress. She has no wheezes. She has no rales. She exhibits no tenderness.   Abdominal: Soft. Bowel sounds are normal. She exhibits no distension and no mass. There is no tenderness. There is no rebound and no guarding.   Musculoskeletal: She exhibits no edema, tenderness or deformity.   Lymphadenopathy:     She has no cervical adenopathy.   Neurological: She is alert and oriented to person, place, and time. She displays normal reflexes. No cranial nerve deficit. She exhibits normal muscle tone. Coordination normal.   Skin: No rash noted. She is not diaphoretic. No erythema. No pallor.   Psychiatric: She has a normal mood and affect. Her behavior is normal. Judgment and thought content normal.       ED Course     ED Course     Procedures          Critical Care time:  none        ED Medications: none      ED Vitals:  Vitals:    11/17/18 1404   BP: (!) 178/133   Resp: 20   Temp: 97.9  F (36.6  C)   TempSrc: Oral   SpO2: 96%       ED Labs and  Imaging:  Results for orders placed or performed during the hospital encounter of 11/17/18 (from the past 24 hour(s))   UA with Microscopic   Result Value Ref Range    Color Urine Straw     Appearance Urine Clear     Glucose Urine Negative NEG^Negative mg/dL    Bilirubin Urine Negative NEG^Negative    Ketones Urine Negative NEG^Negative mg/dL    Specific Gravity Urine 1.004 1.003 - 1.035    Blood Urine Large (A) NEG^Negative    pH Urine 7.0 5.0 - 7.0 pH    Protein Albumin Urine Negative NEG^Negative mg/dL    Urobilinogen mg/dL 0.0 0.0 - 2.0 mg/dL    Nitrite Urine Negative NEG^Negative    Leukocyte Esterase Urine Negative NEG^Negative    Source Midstream Urine     WBC Urine 1 0 - 5 /HPF    RBC Urine <1 0 - 2 /HPF    Bacteria Urine Few (A) NEG^Negative /HPF    Squamous Epithelial /HPF Urine <1 0 - 1 /HPF   HCG qualitative urine   Result Value Ref Range    HCG Qual Urine Negative NEG^Negative   CBC with platelets differential   Result Value Ref Range    WBC 5.1 4.0 - 11.0 10e9/L    RBC Count 4.25 3.8 - 5.2 10e12/L    Hemoglobin 12.5 11.7 - 15.7 g/dL    Hematocrit 38.4 35.0 - 47.0 %    MCV 90 78 - 100 fl    MCH 29.4 26.5 - 33.0 pg    MCHC 32.6 31.5 - 36.5 g/dL    RDW 13.3 10.0 - 15.0 %    Platelet Count 237 150 - 450 10e9/L    Diff Method Automated Method     % Neutrophils 50.5 %    % Lymphocytes 37.4 %    % Monocytes 7.3 %    % Eosinophils 4.0 %    % Basophils 0.6 %    % Immature Granulocytes 0.2 %    Nucleated RBCs 0 0 /100    Absolute Neutrophil 2.6 1.6 - 8.3 10e9/L    Absolute Lymphocytes 1.9 0.8 - 5.3 10e9/L    Absolute Monocytes 0.4 0.0 - 1.3 10e9/L    Absolute Eosinophils 0.2 0.0 - 0.7 10e9/L    Absolute Basophils 0.0 0.0 - 0.2 10e9/L    Abs Immature Granulocytes 0.0 0 - 0.4 10e9/L    Absolute Nucleated RBC 0.0        2:59 PM Patient assessed.      Assessments & Plan (with Medical Decision Making)   Clinical Impression:  Pleasant 48-year-old female who arrived by private car with her family for evaluation feeling  lightheaded and episode of heavy vaginal bleeding with passage of large clots 48 hours prior..  She works at a local [employer].  She reports she has regular menstrual periods which can be heavy but feels she may be perimenopausal.  She also reports she recently had medication changes.  She had been on lisinopril for history of hypertension for the last 11 years but this was switched to losartan in 2018 due to cough.  She reports her blood pressures have been somewhat elevated including blood pressure readings of 130-150/ over the last week.  Patient reports she had vaginal bleeding with passage of large clots for 48 hours but has not had any vaginal bleeding for about 48 hours.  She feels the amount of bleeding has slowed significantly.  Her periods typically last 4-5 days.  She reports she was seen in clinic for lightheadedness with vaginal bleeding and passage of large clots and had a normal hemoglobin.  While at work she felt lightheaded and elected to come to the emergency department for further care.  She is currently taking metoprolol and losartan.  Allergy to Bactrim.  On my exam she is in no acute distress, she is not orthostatic.  She is pleasant.  GCS is 15.  She has no carotid bruit.  She has a normal cardiac and lung exam.  She reported her mother  of a ruptured thoracic aortic aneurysm.  She has had screening for thoracic aortic aneurysm which was negative.  Her  reports she has had intermittent episodes of headache.  She reports a mild headache but denies any blurry vision, no palpitations, no neck pain and no other symptoms.      ED Course and Plan:   We discussed and reviewed that her medical symptoms may be related to recently starting losartan however it is unclear if her symptoms of lightheadedness was related to her recent episode of heavy vaginal bleeding.  She is reporting no active vaginal bleeding and we decided that a pelvic exam was not medically necessary she is not  passing any clots and has no abdominal pain.  She reports she thinks she is perimenopausal and we discussed the role of a pelvic ultrasound.  she reports it had been suggested that she may benefit from a pelvic ultrasound.  A pelvic ultrasound was obtained today to evaluate for uterine fibroids versus polyps versus other acute process that would cause vaginal bleeding in a perimenopausal woman.  Blood work was obtained orthostatic vital signs were also obtained.  Negative urine pregnancy.  Urinalysis shows large blood consistent with her report of currently having her period.    Patient is signed out at shift change to Dr. Erich Chau at shift change at 4PM awaiting blood work, and pelvic ultrasound results . Plan of care is anticipate discharge to home if no significant findings on pelvic ultrasound with plan for outpatient follow-up care.  I suggested she consider taking the losartan at night as she reports she recently began taking losartan during the day.       Disclaimer: This note consists of symbols derived from keyboarding, dictation and/or voice recognition software. As a result, there may be errors in the script that have gone undetected. Please consider this when interpreting information found in this chart.      I have reviewed the nursing notes.    I have reviewed the findings, diagnosis, plan and need for follow up with the patient.       New Prescriptions    No medications on file       Final diagnoses:   Lightheadedness - may be related to medication changes   Episode of heavy vaginal bleeding - With report of passage of large clots over 48 hours.       This document serves as a record of the services and decisions personally performed and made by Osito Vilchis. The HPI was created on his behalf by Serina Encarnacion, a trained medical scribe. The creation of this document is based the provider's statements to the medical scribe.  Serina Encarnacion 2:55 PM 11/17/2018    Provider:   The  information in this document, created by the medical scribe for me, accurately reflects the services I personally performed and the decisions made by me. I have reviewed and approved this document for accuracy prior to leaving the patient care area.  Osito Vilchis, 2:55 PM 11/17/2018 11/17/2018   [facility] EMERGENCY DEPARTMENT     Osito Vilchis MD  11/17/18 6441

## 2018-11-17 NOTE — ED PROVIDER NOTES
ED signout:      Patient signed out to me by previous provider pending laboratory results, in addition ultrasound imaging.    CBC is reviewed, and no acute abnormalities.  Normal hemoglobin, normal white blood cell count.    Ultrasound imaging is also reviewed.  Fibroid uterus is present, with no other endometrial or other  abnormality which is noted.  Patient has follow-up with primary care providers.  Do not feel that further testing is indicated at this point.  Patient encouraged to monitor blood pressures at home, and follow-up with primary care provider regarding blood pressure recordings.    1. Lightheadedness    2. Episode of heavy vaginal bleeding      Erich Dalton MD  11/17/18 0444

## 2018-11-17 NOTE — DISCHARGE INSTRUCTIONS
Follow up with primary doctor.   Monitor blood pressures at home .  Take the losartan in the evening.          Dizziness (Uncertain Cause)  Dizziness is a common symptom. It may be described as lightheadedness, spinning, or feeling like you are going to faint. Dizziness can have many causes.  Be sure to tell the healthcare provider about:    All medicines you take, including prescription, over-the-counter, herbs, and supplements    Any other symptoms you have    Any health problems you are being treated for    Any past major health problems you've had, such as a heart attack, balance issues, hearing problems, or blood pressure problems    Anything that causes the dizziness to get worse or better  Today's exam did not show an exact cause for your dizziness. Other tests may be needed. Follow up with your healthcare provider.  Home care    Dizziness that occurs with sudden standing may be a sign of mild dehydration. Drink extra fluids for the next few days.    If you recently started a new medicine, stopped a medicine, or had the dose of a current medicine changed, talk with the prescribing healthcare provider. Your medicine plan may need adjustment.    If dizziness lasts more than a few seconds, sit or lie down until it passes. This may help prevent injury in case you pass out. Get up slowly when you feel better.    Don't drive or use power tools or dangerous equipment until you have had no dizziness for at least 48 hours.  Follow-up care  Follow up with your healthcare provider for further evaluation within the next 7 days or as advised.  When to seek medical advice  Call your healthcare provider for any of the following:    Worsening of symptoms or new symptoms    Passing out or seizure    Repeated vomiting    Headache    Palpitations (the sense that your heart is fluttering or beating fast or hard)    Shortness of breath    Blood in vomit or stool (black or red color)    Weakness of an arm or leg or 1 side of the  face    Vision or hearing changes    Trouble walking or speaking    Chest, arm, neck, back, or jaw pain  Date Last Reviewed: 11/1/2017 2000-2018 [employer] [address] [place] [address], [place] [address]. All rights reserved. This information is not intended as a substitute for professional medical care. Always follow your healthcare professional's instructions.        Established High Blood Pressure    High blood pressure (hypertension) is a chronic disease. Often, healthcare providers don t know what causes it. But it can be caused by certain health conditions and medicines.  If you have high blood pressure, you may not have any symptoms. If you do have symptoms, they may include headache, dizziness, changes in your vision, chest pain, and shortness of breath. But even without symptoms, high blood pressure that s not treated raises your risk for heart attack, heart failure, and stroke. High blood pressure is a serious health risk and shouldn t be ignored.  Blood pressure measurements are given as 2 numbers. Systolic blood pressure is the upper number. This is the pressure when the heart contracts. Diastolic blood pressure is the lower number. This is the pressure when the heart relaxes between beats. You will see your blood pressure readings written together. For example, a person with a systolic pressure of 118 and a diastolic pressure of 78 will have 118/78 written in the medical record.  Blood pressure is categorized as normal, elevated, or stage 1 or stage 2 high blood pressure:    Normal blood pressure is systolic of less than 120 and diastolic of less than 80 (120/80)    Elevated blood pressure is systolic of 120 to 129 and diastolic less than 80    Stage 1 high blood pressure is systolic is 130 to 139 or diastolic between 80 to 89    Stage 2 high blood pressure is when systolic is 140 or higher or the diastolic is 90 or higher  Home care  If you have high blood pressure, follow these home care  guidelines to help lower your blood pressure. If you are taking medicines for high blood pressure, these methods may reduce or end your need for medicines in the future.    Start a weight-loss program if you are overweight.    Cut back on how much salt you get in your diet. Here s how to do this:  ? Don t eat foods that have a lot of salt. These include olives, pickles, smoked meats, and salted potato chips.  ? Don t add salt to your food at the table.  ? Use only small amounts of salt when cooking.    Start an exercise program. Talk with your healthcare provider about the type of exercise program that would be best for you. It doesn't have to be hard. Even brisk walking for 20 minutes 3 times a week is a good form of exercise.    Don t take medicines that stimulate the heart. This includes many over-the-counter cold and sinus decongestant pills and sprays, as well as diet pills. Check the warnings about high blood pressure on the label. Before buying any over-the-counter medicines or supplements, always ask the pharmacist about the product's potential interaction with your high blood pressure and your high blood pressure medicines.    Stimulants such as amphetamine or cocaine could be deadly for someone with high blood pressure. Never take these.    Limit how much caffeine you get in your diet. Switch to caffeine-free products.    Stop smoking. If you are a long-time smoker, this can be hard. Talk to your healthcare provider about medicines and nicotine replacement options to help you. Also, enroll in a stop-smoking program to make it more likely that you will quit for good.    Learn how to handle stress. This is an important part of any program to lower blood pressure. Learn about relaxation methods like meditation, yoga, or biofeedback.    If your provider prescribed medicines, take them exactly as directed. Missing doses may cause your blood pressure get out of control.    If you miss a dose or doses, check with  your healthcare provider or pharmacist about what to do.    Consider buying an automatic blood pressure machine to check your blood pressure at home. Ask your provider for a recommendation. You can get one of these at most pharmacies.     The American Heart Association recommends the following guidelines for home blood pressure monitoring:    Don't smoke or drink coffee for 30 minutes before taking your blood pressure.    Go to the bathroom before the test.    Relax for 5 minutes before taking the measurement.    Sit with your back supported (don't sit on a couch or soft chair); keep your feet on the floor uncrossed. Place your arm on a solid flat surface (like a table) with the upper part of the arm at heart level. Place the middle of the cuff directly above the bend of the elbow. Check the monitor's instruction manual for an illustration.    Take multiple readings. When you measure, take 2 to 3 readings one minute apart and record all of the results.    Take your blood pressure at the same time every day, or as your healthcare provider recommends.    Record the date, time, and blood pressure reading.    Take the record with you to your next medical appointment. If your blood pressure monitor has a built-in memory, simply take the monitor with you to your next appointment.    Call your provider if you have several high readings. Don't be frightened by a single high blood pressure reading, but if you get several high readings, check in with your healthcare provider.    Note: When blood pressure reaches a systolic (top number) of 180 or higher OR diastolic (bottom number) of 110 or higher, seek emergency medical treatment.  Follow-up care  You will need to see your healthcare provider regularly. This is to check your blood pressure and to make changes to your medicines. Make a follow-up appointment as directed. Bring the record of your home blood pressure readings to the appointment.  When to seek medical  advice  Call your healthcare provider right away if any of these occur:    Blood pressure reaches a systolic (upper number) of 180 or higher OR a diastolic (bottom number) of 110 or higher    Chest pain or shortness of breath    Severe headache    Throbbing or rushing sound in the ears    Nosebleed    Sudden severe pain in your belly (abdomen)    Extreme drowsiness, confusion, or fainting    Dizziness or spinning sensation (vertigo)    Weakness of an arm or leg or one side of the face    You have problems speaking or seeing   Date Last Reviewed: 12/1/2016 2000-2018 [employer] [address] [place] [address]. All rights reserved. This information is not intended as a substitute for professional medical care. Always follow your healthcare professional's instructions.

## 2018-11-17 NOTE — ED NOTES
Menses started Wednesday-passed lg clots Wed and Thursday-lightheaded and dizzy off/on past few days-also has had increase in bp med Thursday

## 2018-11-17 NOTE — TELEPHONE ENCOUNTER
"Patient states she has hypertension and has been feeling side effects from a medication change.   States started Losartan 50 mg 1 tablet daily on 10/25/18.  Patient states that in the past 3 days \"I've felt light headed and dehydrated.\"   Also states \"I've had cold sweats.\"  Reports blood pressure readings have been higher than usual.   States \"yesterday my blood pressure was 130/94. States on Wednesday 11/14/18 \"it was 150/110.\"  Denies fever.   Patient informs this RN that she is on her way now to Urgent Care to have her blood pressure checked.    This RN advised Patient per High Blood Pressure-Adult Protocol.   Advised to see PCP within 3 days per Guidelines.  Caller states understanding of the recommended disposition and tells this RN she is on her way now to [place] [facility].   Advised to call back if further questions or concerns.    Charo Rondon RN  [facility]      Reason for Disposition    [1] Taking BP medications AND [2] feels is having side effects (e.g., impotence, cough, dizzy upon standing)    Additional Information    Negative: Difficult to awaken or acting confused  (e.g., disoriented, slurred speech)    Negative: Severe difficulty breathing (e.g., struggling for each breath, speaks in single words)    Negative: [1] Weakness of the face, arm or leg on one side of the body AND [2] new onset    Negative: [1] Numbness (i.e., loss of sensation) of the face, arm or leg on one side of the body AND [2] new onset    Negative: [1] Chest pain lasts > 5 minutes AND [2] history of heart disease  (i.e., heart attack, bypass surgery, angina, angioplasty, CHF)    Negative: [1] Chest pain AND [2] took nitrogylcerin AND [3] pain was not relieved    Negative: Sounds like a life-threatening emergency to the triager    Negative: [1] BP  >= 160 / 100 AND [2] cardiac or neurologic symptoms    (e.g., chest pain, difficulty breathing, unsteady gait, blurred vision)    Negative: [1] Pregnant AND [2] new hand " or face swelling    Negative: [1] Pregnant > 20 weeks AND [2] BP  >= 140/90    Negative: [1] BP  >= 200/120  AND [2] having NO cardiac or neurologic symptoms    Negative: [1] BP  >= 180/110 AND [2] missed most recent dose of blood pressure medication    Negative: BP  >= 180/110    Negative: Ran out of BP medications    Negative: BP  >= 160/100    Protocols used: HIGH BLOOD PRESSURE-ADULT-AH

## 2018-11-17 NOTE — LETTER
November 17, 2018      To Whom It May Concern:      Noe Fitzgerald was seen in our Emergency Department today, 11/17/18. Please excuse her from missing work due to her ED visit. She will benefit from additional care and follow-up if symptoms persist      Sincerely,         Jaun Vilchis MD, FACEP

## 2018-11-19 ENCOUNTER — ALLIED HEALTH/NURSE VISIT (OUTPATIENT)
Dept: FAMILY MEDICINE | Facility: CLINIC | Age: 48
End: 2018-11-19
Payer: COMMERCIAL

## 2018-11-19 VITALS — OXYGEN SATURATION: 99 % | SYSTOLIC BLOOD PRESSURE: 138 MMHG | DIASTOLIC BLOOD PRESSURE: 100 MMHG | HEART RATE: 74 BPM

## 2018-11-19 DIAGNOSIS — I10 HYPERTENSION GOAL BP (BLOOD PRESSURE) < 140/90: Primary | ICD-10-CM

## 2018-11-19 PROCEDURE — 99207 ZZC NO CHARGE NURSE ONLY: CPT

## 2018-11-19 NOTE — MR AVS SNAPSHOT
MRN:[id number]                      After Visit Summary   11/19/2018    Noe Fitzgerald    MRN: [id number]           Visit Information        Provider Department      11/19/2018 11:15 AM [place]/LPN [facility]        MyChart Information     [facility] gives you secure access to your electronic health record. If you see a primary care provider, you can also send messages to your care team and make appointments. If you have questions, please call your primary care clinic.  If you do not have a primary care provider, please call [phone] and they will assist you.        Care EveryWhere ID     This is your Care EveryWhere ID. This could be used by other organizations to access your [facility] medical records  [id number]        Equal Access to Services     GOLDEN WADDELL : Morena Butts, bandar alston, dinah qureshialharpal miller, david guillen. So [facility] [phone].    ATENCIÓN: Si habla español, tiene a gaytan disposición servicios gratuitos de asistencia lingüística. Llame al [phone].    We comply with applicable federal civil rights laws and [place] laws. We do not discriminate on the basis of race, color, national origin, age, disability, sex, sexual orientation, or gender identity.

## 2018-11-19 NOTE — NURSING NOTE
Patient was in from ER follow up to have blood pressure checked - She said she has been feeling lightheaded and shaky since medication switch by Dr. Flores. She is wondering if she should be going back on her previous medication or change it since her numbers have gone up - they were 154/107 at work last week. I informed her I would talk to Dr. Flores when she is in clinic tomorrow and she stated she had MyCharted her too. Adwoa Jackson MA

## 2018-12-03 ENCOUNTER — ALLIED HEALTH/NURSE VISIT (OUTPATIENT)
Dept: FAMILY MEDICINE | Facility: CLINIC | Age: 48
End: 2018-12-03
Payer: COMMERCIAL

## 2018-12-03 VITALS — SYSTOLIC BLOOD PRESSURE: 138 MMHG | DIASTOLIC BLOOD PRESSURE: 88 MMHG

## 2018-12-03 DIAGNOSIS — I10 HYPERTENSION GOAL BP (BLOOD PRESSURE) < 140/90: Primary | ICD-10-CM

## 2018-12-03 PROCEDURE — 99207 ZZC NO CHARGE NURSE ONLY: CPT

## 2018-12-03 NOTE — MR AVS SNAPSHOT
After Visit Summary   12/3/2018    Noe Fitzgerald    MRN: [id number]           Patient Information     Date Of Birth          1970        Visit Information        Provider Department      12/3/2018 11:00 AM [place]/LPN [facility]        Today's Diagnoses     Hypertension goal BP (blood pressure) < 140/90    -  1       Follow-ups after your visit        Who to contact     If you have questions or need follow up information about today's clinic visit or your schedule please contact [facility] directly at [phone].  Normal or non-critical lab and imaging results will be communicated to you by PenteoSurroundhart, letter or phone within 4 business days after the clinic has received the results. If you do not hear from us within 7 days, please contact the clinic through NeuralStemt or phone. If you have a critical or abnormal lab result, we will notify you by phone as soon as possible.  Submit refill requests through Yabbly or call your pharmacy and they will forward the refill request to us. Please allow 3 business days for your refill to be completed.          Additional Information About Your Visit        MyChart Information     [employer] gives you secure access to your electronic health record. If you see a primary care provider, you can also send messages to your care team and make appointments. If you have questions, please call your primary care clinic.  If you do not have a primary care provider, please call [phone] and they will assist you.        Care EveryWhere ID     This is your Care EveryWhere ID. This could be used by other organizations to access your [facility] medical records  [id number]         Blood Pressure from Last 3 Encounters:   12/03/18 138/88   11/19/18 (!) 138/100   11/17/18 150/88    Weight from Last 3 Encounters:   10/25/18 237 lb (107.5 kg)   06/20/17 226 lb 8 oz (102.7 kg)   05/26/17 226 lb (102.5 kg)              Today, you had the following      No orders found for display       Primary Care Provider Office Phone # Fax #    Abril Bello [phone]       [address] [place] [address]        Equal Access to Services     NEGROJACK NADIRA : Hadii matt ku isabelo Sosammiali, waaxda luqadaha, qaybta kaalmada adegibsonda, david landersliz wilmer. So [facility] [phone].    ATENCIÓN: Si habla español, tiene a gaytan disposición servicios gratuitos de asistencia lingüística. Llame al [phone].    We comply with applicable federal civil rights laws and [place] laws. We do not discriminate on the basis of race, color, national origin, age, disability, sex, sexual orientation, or gender identity.            Thank you!     Thank you for choosing [facility]  for your care. Our goal is always to provide you with excellent care. Hearing back from our patients is one way we can continue to improve our services. Please take a few minutes to complete the written survey that you may receive in the mail after your visit with us. Thank you!             Your Updated Medication List - Protect others around you: Learn how to safely use, store and throw away your medicines at www.disposemymeds.org.          This list is accurate as of 12/3/18 11:17 AM.  Always use your most recent med list.                   Brand Name Dispense Instructions for use Diagnosis    losartan 50 MG tablet    COZAAR    90 tablet    Take 1 tablet (50 mg) by mouth daily    Hypertension goal BP (blood pressure) < 140/90       metoprolol succinate ER 25 MG 24 hr tablet    TOPROL-XL    90 tablet    TAKE ONE TABLET BY MOUTH ONCE DAILY    Ascending aorta dilatation (H)       ranitidine 150 MG capsule    ZANTAC     Take 150 mg by mouth daily as needed for heartburn

## 2018-12-03 NOTE — PROGRESS NOTES
Blood pressure check - she still runs high at work - Informed her to keep a daily log at the same time and to follow up in 1 week. Adwoa Jackson MA

## 2018-12-12 RX ORDER — CHLORTHALIDONE 25 MG/1
25 TABLET ORAL DAILY
Qty: 90 TABLET | Refills: 1 | Status: SHIPPED | OUTPATIENT
Start: 2018-12-12 | End: 2019-01-08

## 2018-12-31 ENCOUNTER — ALLIED HEALTH/NURSE VISIT (OUTPATIENT)
Dept: FAMILY MEDICINE | Facility: CLINIC | Age: 48
End: 2018-12-31
Payer: COMMERCIAL

## 2018-12-31 ENCOUNTER — TELEPHONE (OUTPATIENT)
Dept: FAMILY MEDICINE | Facility: CLINIC | Age: 48
End: 2018-12-31

## 2018-12-31 VITALS — DIASTOLIC BLOOD PRESSURE: 98 MMHG | HEART RATE: 87 BPM | OXYGEN SATURATION: 96 % | SYSTOLIC BLOOD PRESSURE: 152 MMHG

## 2018-12-31 DIAGNOSIS — I10 HYPERTENSION GOAL BP (BLOOD PRESSURE) < 140/90: Primary | ICD-10-CM

## 2018-12-31 PROCEDURE — 99207 ZZC NO CHARGE NURSE ONLY: CPT

## 2018-12-31 NOTE — PROGRESS NOTES
Patient stopped in to have blood pressure checked - she has been having readings of 171/103 at work being on just the hydrochlorothiazide. She is wondering if she should go back on the Lisinopril? She states her cough may be better controlled by her caring for her acid reflux better. She said when she was on Losartin her readings were 140-150/. She has also been somewhat lightheaded. She does like not feeling bloated being on the hydrochlorothiazide. Please advise what your recommendations would be for her at this time. Adwoa Jackson MA

## 2018-12-31 NOTE — TELEPHONE ENCOUNTER
Pulse Readings from Last 3 Encounters:   12/31/18 87   11/19/18 74   10/25/18 71      BP Readings from Last 6 Encounters:   12/31/18 (!) 152/98   12/03/18 138/88   11/19/18 (!) 138/100   11/17/18 150/88   10/25/18 126/80   06/20/17 (!) 149/104

## 2019-01-08 ENCOUNTER — TELEPHONE (OUTPATIENT)
Dept: FAMILY MEDICINE | Facility: CLINIC | Age: 49
End: 2019-01-08

## 2019-01-08 DIAGNOSIS — I10 HYPERTENSION GOAL BP (BLOOD PRESSURE) < 140/90: Primary | ICD-10-CM

## 2019-01-08 RX ORDER — LISINOPRIL 20 MG/1
20 TABLET ORAL DAILY
Qty: 90 TABLET | Refills: 1 | Status: SHIPPED | OUTPATIENT
Start: 2019-01-08 | End: 2019-07-11

## 2019-01-08 NOTE — TELEPHONE ENCOUNTER
Called pt and informed her of lisinopril Rx sent to [facility] in [place].  Dicussed that she should not continue to take the chlorthalidone too, Dr. Flores discontinued this today when the lisinopril was reordered.  Reviewed med list and pt confirmed: lisinopril, metoprolol succinate and ranitidine PRN.    Denae BRADLEY RN      
Dr. Flores, please advise.    Denae BRADLEY RN      
Ok, I reordered. Let her know  
Pt left message. Pt was put on new B/P med and its not working. She would like to start back on her lisinopril. Pt states her and Dr Bello have discussed this.  
Past 24 hrs

## 2019-02-06 ENCOUNTER — OFFICE VISIT (OUTPATIENT)
Dept: FAMILY MEDICINE | Facility: CLINIC | Age: 49
End: 2019-02-06
Payer: COMMERCIAL

## 2019-02-06 VITALS
WEIGHT: 242.2 LBS | HEIGHT: 66 IN | OXYGEN SATURATION: 99 % | TEMPERATURE: 96.2 F | SYSTOLIC BLOOD PRESSURE: 134 MMHG | BODY MASS INDEX: 38.92 KG/M2 | DIASTOLIC BLOOD PRESSURE: 86 MMHG | RESPIRATION RATE: 18 BRPM | HEART RATE: 79 BPM

## 2019-02-06 DIAGNOSIS — Z12.11 SPECIAL SCREENING FOR MALIGNANT NEOPLASMS, COLON: ICD-10-CM

## 2019-02-06 DIAGNOSIS — H69.93 DYSFUNCTION OF BOTH EUSTACHIAN TUBES: ICD-10-CM

## 2019-02-06 DIAGNOSIS — R42 DIZZINESS: Primary | ICD-10-CM

## 2019-02-06 PROCEDURE — 99213 OFFICE O/P EST LOW 20 MIN: CPT | Performed by: NURSE PRACTITIONER

## 2019-02-06 RX ORDER — FLUTICASONE PROPIONATE 50 MCG
2 SPRAY, SUSPENSION (ML) NASAL DAILY
Qty: 1 BOTTLE | Refills: 3 | Status: SHIPPED | OUTPATIENT
Start: 2019-02-06 | End: 2020-02-03

## 2019-02-06 ASSESSMENT — MIFFLIN-ST. JEOR: SCORE: 1735.36

## 2019-02-06 NOTE — PROGRESS NOTES
"  SUBJECTIVE:   Noe Fitzgerald is a 49 year old female who presents to clinic today for the following health issues:      Dizziness  Onset: 2/3/2019 episode lasting 1 hour and today  is lasting off and on all day - more she is off     Description:   Do you feel faint:  no   Does it feel like the surroundings (bed, room) are moving: no   Unsteady/off balance: YES - during episode  Have you passed out or fallen: no     Intensity: moderate    Progression of Symptoms:  intermittent    Accompanying Signs & Symptoms:  Heart palpitations: YES - sporadically with spells, but feels she gets anxious during episodes  Nausea, vomiting: YES- nausea  Weakness in arms or legs: no   Fatigue: no   Vision or speech changes: no   Ringing in ears (Tinnitus): no   Hearing Loss: no     History:   Head trauma/concussion hx: no   Previous similar symptoms: no   Recent bleeding history: YES- a few months ago, vaginal bleeding  Inner ear operation in 1980s-YES    Precipitating factors:   Worse with activity or head movement: no   Any new medications (BP?): no - no recent changes   Alcohol/drug abuse/withdrawal: no     Alleviating factors:   Does staying in a fixed position give relief:  YES    Therapies Tried and outcome: sits or lays down-went away Sunday after 1 hour and today seems to be off and on all day    Has had ear problems in the past, surgery as a child - has had continued on/off issues with ears  Does report recent allergy/sinus symptoms    Sunday felt like a \"hot flash\", otherwise no fevers or chills       Problem list and histories reviewed & adjusted, as indicated.  Additional history: as documented    Patient Active Problem List   Diagnosis     Large breasts     CARDIOVASCULAR SCREENING; LDL GOAL LESS THAN 160     Hypertension goal BP (blood pressure) < 140/90     ASCUS with positive high risk HPV cervical     Ascending aorta dilatation (H)     Papanicolaou smear of cervix with atypical squamous cells of undetermined " "significance (ASC-US)     Obesity (BMI 35.0-39.9) with comorbidity (H)     Past Surgical History:   Procedure Laterality Date     ESOPHAGOSCOPY, GASTROSCOPY, DUODENOSCOPY (EGD), COMBINED N/A 1/26/2017    Procedure: COMBINED ESOPHAGOSCOPY, GASTROSCOPY, DUODENOSCOPY (EGD);  Surgeon: Damian Ames MD;  Location: [facility]     LEEP TX, CERVICAL  1991    LEEP TX Cervical     MAMMOPLASTY REDUCTION BILATERAL Bilateral 5/22/2017    Procedure: MAMMOPLASTY REDUCTION BILATERAL;  Bilateral Breast Reduction Mammoplasty;  Surgeon: Franny Reynolds MD;  Location:  OR     SURGICAL HISTORY OF -       Ear surgery     SURGICAL HISTORY OF -       Foot surgery       Social History     Tobacco Use     Smoking status: Never Smoker     Smokeless tobacco: Never Used   Substance Use Topics     Alcohol use: No     Comment: rarely     Family History   Problem Relation Age of Onset     Hypertension Father          Current Outpatient Medications   Medication Sig Dispense Refill     fluticasone (FLONASE) 50 MCG/ACT nasal spray Spray 2 sprays into both nostrils daily 1 Bottle 3     lisinopril (PRINIVIL/ZESTRIL) 20 MG tablet Take 1 tablet (20 mg) by mouth daily 90 tablet 1     metoprolol succinate (TOPROL-XL) 25 MG 24 hr tablet TAKE ONE TABLET BY MOUTH ONCE DAILY 90 tablet 3     ranitidine (ZANTAC) 150 MG capsule Take 150 mg by mouth daily as needed for heartburn       Allergies   Allergen Reactions     Bactrim [Sulfamethoxazole W/Trimethoprim] Rash       Reviewed and updated as needed this visit by clinical staff  Tobacco  Allergies  Meds  Problems  Med Hx  Surg Hx  Fam Hx  Soc Hx        Reviewed and updated as needed this visit by Provider  Tobacco  Allergies  Meds  Problems  Med Hx  Surg Hx  Fam Hx  Soc Hx          ROS:  Constitutional, HEENT, cardiovascular, pulmonary, gi and gu systems are negative, except as otherwise noted.    OBJECTIVE:     /86   Pulse 79   Temp 96.2  F (35.7  C)   Resp 18   Ht 1.676 m (5' 6\")  "  Wt 109.9 kg (242 lb 3.2 oz)   LMP 01/28/2019 (Exact Date)   SpO2 99%   Breastfeeding? No   BMI 39.09 kg/m    Body mass index is 39.09 kg/m .  GENERAL APPEARANCE: healthy, alert and no distress  EYES: Eyes grossly normal to inspection, PERRL, conjunctivae and sclerae normal and visual fields normal, negative nystagmus  HENT: ear canals normal and left TM with serous fluid, right TM with serous fluid and sclerosis present and nose and mouth without ulcers or lesions  NECK: no adenopathy, no asymmetry, masses, or scars and thyroid normal to palpation  RESP: lungs clear to auscultation - no rales, rhonchi or wheezes  CV: regular rates and rhythm, normal S1 S2, no S3 or S4 and no murmur, click or rub  ABDOMEN: soft, nontender, without hepatosplenomegaly or masses and bowel sounds normal  MS: extremities normal- no gross deformities noted and peripheral pulses normal, gait normal  SKIN: no suspicious lesions or rashes  NEURO: Normal strength and tone, mentation intact, speech normal, cranial nerves 2-12 intact, Romberg negative  PSYCH: mentation appears normal and affect normal/bright    Diagnostic Test Results:  none     ASSESSMENT/PLAN:     1. Dizziness  Patient with intermittent dizziness over the last few days with slight nausea and imbalance with episodes.  Patient has had in the past.  Patient also reports intermittent palpitations during episode that resolve after.  TMs with serous fluid bilateral with right TM with significant sclerotic changes secondary to past surgery.  Patient does report recently being ill with sinus/allergy symptoms.  EKG unable to be done in due to technical difficulties.  Patient denies current palpitations.  Patient neurologically intact.  Likely dizziness secondary to confusion and eustachian tube dysfunction.  Advised patient to start Flonase nasal spray and over the counter anti-histamine. She should also schedule with ENT for evaluation.  If symptoms worsen or do not improve in  the interim patient should return to clinic.  Patient advised she can schedule EKG at our [place] location if palpitations continue.  Patient agreeable.  - EKG 12-lead complete w/read - Clinics - unable   - OTOLARYNGOLOGY REFERRAL    2. Dysfunction of both eustachian tubes  See above.  - OTOLARYNGOLOGY REFERRAL  - fluticasone (FLONASE) 50 MCG/ACT nasal spray; Spray 2 sprays into both nostrils daily  Dispense: 1 Bottle; Refill: 3    3. Special screening for malignant neoplasms, colon    - GASTROENTEROLOGY ADULT REF PROCEDURE ONLY [facility] [phone]; No Provider Preference      Patient Instructions   Dizziness is likely from the cold symptoms and fluid buildup  EKG was not able to be done in office today - if continued palpitations schedule for a nurse visit in [place] to have this done  Would like you to start an over the counter anti-histamine - either Zyrtec or Claritin   May start Flonase if symptoms not improving - 2 sprays each nostril, I sent through a prescription    Schedule to see ENT - can schedule with Dr. Irvin in [place]   For tissue in between breasts would maybe follow up with breast surgeon      [facility] Mammo Schedule  [facility] ~ [phone]  One Day Weekly- Alternating Days    [place] ~ [phone]  Every other Monday or Wednesday   & one Saturday morning a month    [place] ~ [phone]  Every Other Monday Afternoon    [place] ~ [phone]  Every Other Monday Morning    [place] ~ [phone]  Every Monday morning  Every Tuesday afternoon  Wed, Thurs, Friday morning & afternoon    The  will call you to schedule your colonoscopy in [place]. Or you may call [facility] [phone] to schedule.       GEETHA Barbosa CNP  [facility]

## 2019-02-06 NOTE — PATIENT INSTRUCTIONS
Dizziness is likely from the cold symptoms and fluid buildup  EKG was not able to be done in office today - if continued palpitations schedule for a nurse visit in [place] to have this done  Would like you to start an over the counter anti-histamine - either Zyrtec or Claritin   May start Flonase if symptoms not improving - 2 sprays each nostril, I sent through a prescription    Schedule to see ENT - can schedule with Dr. Irvin in [place]   For tissue in between breasts would maybe follow up with breast surgeon      [facility] Mammo Schedule  [facility] ~ [phone]  One Day Weekly- Alternating Days    [place] ~ [phone]  Every other Monday or Wednesday   & one Saturday morning a month    [place] ~ [phone]  Every Other Monday Afternoon    [place] ~ [phone]  Every Other Monday Morning    [place] ~ [phone]  Every Monday morning  Every Tuesday afternoon  Wed, Thurs, Friday morning & afternoon    The  will call you to schedule your colonoscopy in [place]. Or you may call [facility] [phone] to schedule.

## 2019-02-06 NOTE — NURSING NOTE
"Chief Complaint   Patient presents with     Dizziness       Initial /86   Pulse 79   Temp 96.2  F (35.7  C)   Resp 18   Ht 1.676 m (5' 6\")   Wt 109.9 kg (242 lb 3.2 oz)   LMP 01/28/2019 (Exact Date)   SpO2 99%   Breastfeeding? No   BMI 39.09 kg/m   Estimated body mass index is 39.09 kg/m  as calculated from the following:    Height as of this encounter: 1.676 m (5' 6\").    Weight as of this encounter: 109.9 kg (242 lb 3.2 oz).    Patient presents to the clinic using No DME    Health Maintenance that is potentially due pending provider review:  mammo , colon    Given info to schedule    Is there anyone who you would like to be able to receive your results? not asked  If yes have patient fill out NATACHA    "

## 2019-02-23 DIAGNOSIS — I77.810 ASCENDING AORTA DILATATION (H): ICD-10-CM

## 2019-02-25 RX ORDER — METOPROLOL SUCCINATE 25 MG/1
TABLET, EXTENDED RELEASE ORAL
Qty: 90 TABLET | Refills: 1 | Status: SHIPPED | OUTPATIENT
Start: 2019-02-25 | End: 2019-08-30

## 2019-02-25 NOTE — TELEPHONE ENCOUNTER
"Requested Prescriptions   Pending Prescriptions Disp Refills     metoprolol succinate ER (TOPROL-XL) 25 MG 24 hr tablet [Pharmacy Med Name: METOPROLOL ER 25MG  TAB] 90 tablet 3     Sig: TAKE ONE TABLET BY MOUTH ONCE DAILY    Beta-Blockers Protocol Passed - 2/23/2019  4:37 PM       Passed - Blood pressure under 140/90 in past 12 months    BP Readings from Last 3 Encounters:   02/06/19 134/86   12/31/18 (!) 152/98   12/03/18 138/88                Passed - Patient is age 6 or older       Passed - Recent (12 mo) or future (30 days) visit within the authorizing provider's specialty    Patient had office visit in the last 12 months or has a visit in the next 30 days with authorizing provider or within the authorizing provider's specialty.  See \"Patient Info\" tab in inbasket, or \"Choose Columns\" in Meds & Orders section of the refill encounter.             Passed - Medication is active on med list        Last Written Prescription Date:  2/2/18  Last Fill Quantity: 90,  # refills: 3   Last office visit: 2/6/2019 with prescribing provider:     Future Office Visit:      "

## 2019-03-22 ENCOUNTER — OFFICE VISIT (OUTPATIENT)
Dept: OTOLARYNGOLOGY | Facility: CLINIC | Age: 49
End: 2019-03-22
Payer: COMMERCIAL

## 2019-03-22 ENCOUNTER — OFFICE VISIT (OUTPATIENT)
Dept: AUDIOLOGY | Facility: CLINIC | Age: 49
End: 2019-03-22
Payer: COMMERCIAL

## 2019-03-22 VITALS
SYSTOLIC BLOOD PRESSURE: 126 MMHG | OXYGEN SATURATION: 98 % | RESPIRATION RATE: 18 BRPM | DIASTOLIC BLOOD PRESSURE: 88 MMHG | HEART RATE: 82 BPM

## 2019-03-22 DIAGNOSIS — H90.A21 SENSORINEURAL HEARING LOSS (SNHL) OF RIGHT EAR WITH RESTRICTED HEARING OF LEFT EAR: Primary | ICD-10-CM

## 2019-03-22 DIAGNOSIS — H90.A12 CONDUCTIVE HEARING LOSS OF LEFT EAR WITH RESTRICTED HEARING OF RIGHT EAR: ICD-10-CM

## 2019-03-22 DIAGNOSIS — R42 DIZZINESS: Primary | ICD-10-CM

## 2019-03-22 DIAGNOSIS — H92.03 OTALGIA OF BOTH EARS: ICD-10-CM

## 2019-03-22 PROCEDURE — 92567 TYMPANOMETRY: CPT | Performed by: AUDIOLOGIST

## 2019-03-22 PROCEDURE — 99207 ZZC NO CHARGE LOS: CPT | Performed by: AUDIOLOGIST

## 2019-03-22 PROCEDURE — 99203 OFFICE O/P NEW LOW 30 MIN: CPT | Performed by: OTOLARYNGOLOGY

## 2019-03-22 PROCEDURE — 92557 COMPREHENSIVE HEARING TEST: CPT | Performed by: AUDIOLOGIST

## 2019-03-22 NOTE — PATIENT INSTRUCTIONS
Per physician instructions.    If you have questions or concerns on any instructions given to you by your provider today or if you need to schedule an appointment, you can reach us at [phone].    Thank you!

## 2019-03-22 NOTE — PROGRESS NOTES
History of Present Illness - Noe Fitzgerald is a 49 year old female here to see me for the first time due to ear symptoms and dizziness at the consutlation of [facility].    She tells me that she has a history of issues with her ears for a long time.  She had a hole in her RIGHT ear when she was a child, and had it fixed in sixth grade. But has not caused  Major issues since then.    But the newer issue is a few months ago she has had issues for a few months with some light headedness.  Specifically it would happen if she was standing for long periods of time, or if she would go from sitting to standing.  Then it would last for 5-10 minutes.  There were no ear symptoms during those episodes.    But then about a month ago she did start to have ear symptoms.  She describes it as a deep ache or pressure int he ears that would come and go. It does not necessarily seem to effect her hearing or balance, and it can be once side, or both ears would hurt.  Also of note, it would sometimes occur with chewing or she would wake up in the mornign with pressure in the face and jaw.  Of note, her ear symptoms do not coincide with her light headedness.  Her blood pressure medications are continuing to be adjusted.    Past Medical History -   Patient Active Problem List   Diagnosis     Large breasts     CARDIOVASCULAR SCREENING; LDL GOAL LESS THAN 160     Hypertension goal BP (blood pressure) < 140/90     ASCUS with positive high risk HPV cervical     Ascending aorta dilatation (H)     Papanicolaou smear of cervix with atypical squamous cells of undetermined significance (ASC-US)     Obesity (BMI 35.0-39.9) with comorbidity (H)       Current Medications -   Current Outpatient Medications:      fluticasone (FLONASE) 50 MCG/ACT nasal spray, Spray 2 sprays into both nostrils daily, Disp: 1 Bottle, Rfl: 3     lisinopril (PRINIVIL/ZESTRIL) 20 MG tablet, Take 1 tablet (20 mg) by mouth daily, Disp: 90 tablet, Rfl: 1     metoprolol  succinate ER (TOPROL-XL) 25 MG 24 hr tablet, TAKE ONE TABLET BY MOUTH ONCE DAILY, Disp: 90 tablet, Rfl: 1     ranitidine (ZANTAC) 150 MG capsule, Take 150 mg by mouth daily as needed for heartburn, Disp: , Rfl:     Allergies -   Allergies   Allergen Reactions     Bactrim [Sulfamethoxazole W/Trimethoprim] Rash       Social History -   Social History     Socioeconomic History     Marital status:      Spouse name: Not on file     Number of children: Not on file     Years of education: Not on file     Highest education level: Not on file   Occupational History     Not on file   Social Needs     Financial resource strain: Not on file     Food insecurity:     Worry: Not on file     Inability: Not on file     Transportation needs:     Medical: Not on file     Non-medical: Not on file   Tobacco Use     Smoking status: Never Smoker     Smokeless tobacco: Never Used   Substance and Sexual Activity     Alcohol use: No     Comment: rarely     Drug use: No     Sexual activity: Yes     Partners: Male   Lifestyle     Physical activity:     Days per week: Not on file     Minutes per session: Not on file     Stress: Not on file   Relationships     Social connections:     Talks on phone: Not on file     Gets together: Not on file     Attends [employer] service: Not on file     Active member of club or organization: Not on file     Attends meetings of clubs or organizations: Not on file     Relationship status: Not on file     Intimate partner violence:     Fear of current or ex partner: Not on file     Emotionally abused: Not on file     Physically abused: Not on file     Forced sexual activity: Not on file   Other Topics Concern     Parent/sibling w/ CABG, MI or angioplasty before 65F 55M? No   Social History Narrative     Not on file       Family History -   Family History   Problem Relation Age of Onset     Hypertension Father        Review of Systems - As per HPI and PMHx, otherwise 10+ system review of the head and neck,  and general constitution is negative.    Physical Exam  /88 (BP Location: Right arm, Patient Position: Chair, Cuff Size: Adult Large)   Pulse 82   Resp 18   SpO2 98%     General - The patient is well nourished and well developed, and appears to have good nutritional status.  Alert and oriented to person and place, answers questions and cooperates with examination appropriately.   Head and Face - Normocephalic and atraumatic, with no gross asymmetry noted of the contour of the facial features.  The facial nerve is intact, with strong symmetric movements.  Voice and Breathing - The patient was breathing comfortably without the use of accessory muscles. There was no wheezing, stridor, or stertor.  The patients voice was clear and strong, and had appropriate pitch and quality.  Ears - the ears were examined with the binocular microscope.  The RIGHT ear is in a post surgical state with variegated density due to previous tympanoplasty.  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  No effusion seen.  The LEFT tympanic membrane is healthy, no retraction or perforation.  Eyes - Extraocular movements intact, and the pupils were reactive to light.  Sclera were not icteric or injected, conjunctiva were pink and moist.  Mouth - Examination of the oral cavity showed pink, healthy oral mucosa. No lesions or ulcerations noted.  The tongue was mobile and midline, and the dentition were in good condition.    Throat - The walls of the oropharynx were smooth, pink, moist, symmetric, and had no lesions or ulcerations.  The tonsillar pillars and soft palate were symmetric.  The uvula was midline on elevation.    Neck - Normal midline excursion of the laryngotracheal complex during swallowing.  Full range of motion on passive movement.  Palpation of the occipital, submental, submandibular, internal jugular chain, and  supraclavicular nodes did not demonstrate any abnormal lymph nodes or masses.  The carotid pulse was palpable bilaterally.  Palpation of the thyroid was soft and smooth, with no nodules or goiter appreciated.  The trachea was mobile and midline.  Nose - External contour is symmetric, no gross deflection or scars.  Nasal mucosa is pink and moist with no abnormal mucus.  The septum was midline and non-obstructive, turbinates of normal size and position.  No polyps, masses, or purulence noted on examination.    Audiologic Studies - An audiogram shows mild sensorineural hearing loss bilaterally with some mild conductive hearing loss bilaterally.       A/P - Noe Fitzgerald is a 49 year old female  (R42) Dizziness  (primary encounter diagnosis)  (H92.03) Otalgia of both ears    Although there is certainly scarring on the tympanic membrane's from her pervious ear surgery, I think that there are actually two separate issues here.  Given her symptoms of light headedness, that are getting better now that her HTN medications have been adjusted, I think that was the issue.  But I think that this ear pain is actually not ear disease, as my testing and microscopic exam look good today.    Based on today's history and physical exam my primary diagnosis is of temporomandibular syndrome.  I have discussed the etiology of TMJ, and the reasons why referred pain can mimic symptoms of ear disease, headaches, and even sinusitis.  i have given the patient an instructional sheet of things to be tried at home, as well a referral to a TMJ specialist should it be needed.  Finally, I counseled the patient that should the therapy not help, or should the symptoms change, that they should return to me.

## 2019-03-22 NOTE — PROGRESS NOTES
AUDIOLOGY REPORT:    Patient was referred from ENT by Dr. Irvin for audiology evaluation. Patient reports a history of tympanoplasty in the right ear in childhood. She reports intermittent bilateral otalgia. Over the past few months, patient has been experiencing episodic dizziness. She states that it has been happening more over the past months. It generally happens when she gets out of bed or after standing for a prolonged period of time at work. Dizziness lasts for about 5-10 minutes and patient describes lightheadedness and imbalance. She denies true vertigo. Patient feels that her hearing is poor and notes that her right ear seems to hear better than her left.     Testing:    Otoscopy:   Otoscopic exam indicates ears are clear of cerumen bilaterally     Tympanograms:    RIGHT: restricted eardrum mobility      LEFT:   negative pressure     Reflexes (reported by stimulus ear):  Did not test due to abnormal tympanograms    Thresholds:   Pure Tone Thresholds assessed using conventional audiometry with good reliability from 250-8000 Hz bilaterally using insert earphones and circumaural headphones     RIGHT:  normal, borderline-normal and mild sensorineural hearing loss    LEFT:    mild and moderate conductive hearing loss    Speech Reception Threshold:    RIGHT: 20 dB HL    LEFT:   35 dB HL  Results are in agreement with pure tone average.     Word Recognition Score:     RIGHT: 96% at 60 dB HL using NU-6 recorded word list.    LEFT:   100% at 75 dB HL using NU-6 recorded word list.    Discussed results with the patient. Recommend re-evaluation of hearing post medical management.       Patient was returned to ENT for follow up.     Evy Coats, CCC-A  Licensed Audiologist #[id number]  3/22/2019

## 2019-03-22 NOTE — NURSING NOTE
"Chief Complaint   Patient presents with     Dizziness     Ear Problem       Initial /88 (BP Location: Right arm, Patient Position: Chair, Cuff Size: Adult Large)   Pulse 82   Resp 18   SpO2 98%  Estimated body mass index is 39.09 kg/m  as calculated from the following:    Height as of 2/6/19: 1.676 m (5' 6\").    Weight as of 2/6/19: 109.9 kg (242 lb 3.2 oz).  BP completed using cuff size: large   Medications and allergies reviewed.      Alondra KENT CMA     "

## 2019-03-22 NOTE — LETTER
3/22/2019         RE: Noe Fitzgerald  [address] [place] [id number]        Dear Colleague,    Thank you for referring your patient, Noe Fitzgerald, to the [facility]. Please see a copy of my visit note below.    History of Present Illness - Noe Fitzgerald is a 49 year old female here to see me for the first time due to ear symptoms and dizziness at the consutlation of [facility].    She tells me that she has a history of issues with her ears for a long time.  She had a hole in her RIGHT ear when she was a child, and had it fixed in sixth grade. But has not caused  Major issues since then.    But the newer issue is a few months ago she has had issues for a few months with some light headedness.  Specifically it would happen if she was standing for long periods of time, or if she would go from sitting to standing.  Then it would last for 5-10 minutes.  There were no ear symptoms during those episodes.    But then about a month ago she did start to have ear symptoms.  She describes it as a deep ache or pressure int he ears that would come and go. It does not necessarily seem to effect her hearing or balance, and it can be once side, or both ears would hurt.  Also of note, it would sometimes occur with chewing or she would wake up in the mornign with pressure in the face and jaw.  Of note, her ear symptoms do not coincide with her light headedness.  Her blood pressure medications are continuing to be adjusted.    Past Medical History -   Patient Active Problem List   Diagnosis     Large breasts     CARDIOVASCULAR SCREENING; LDL GOAL LESS THAN 160     Hypertension goal BP (blood pressure) < 140/90     ASCUS with positive high risk HPV cervical     Ascending aorta dilatation (H)     Papanicolaou smear of cervix with atypical squamous cells of undetermined significance (ASC-US)     Obesity (BMI 35.0-39.9) with comorbidity (H)       Current Medications -   Current Outpatient  Medications:      fluticasone (FLONASE) 50 MCG/ACT nasal spray, Spray 2 sprays into both nostrils daily, Disp: 1 Bottle, Rfl: 3     lisinopril (PRINIVIL/ZESTRIL) 20 MG tablet, Take 1 tablet (20 mg) by mouth daily, Disp: 90 tablet, Rfl: 1     metoprolol succinate ER (TOPROL-XL) 25 MG 24 hr tablet, TAKE ONE TABLET BY MOUTH ONCE DAILY, Disp: 90 tablet, Rfl: 1     ranitidine (ZANTAC) 150 MG capsule, Take 150 mg by mouth daily as needed for heartburn, Disp: , Rfl:     Allergies -   Allergies   Allergen Reactions     Bactrim [Sulfamethoxazole W/Trimethoprim] Rash       Social History -   Social History     Socioeconomic History     Marital status:      Spouse name: Not on file     Number of children: Not on file     Years of education: Not on file     Highest education level: Not on file   Occupational History     Not on file   Social Needs     Financial resource strain: Not on file     Food insecurity:     Worry: Not on file     Inability: Not on file     Transportation needs:     Medical: Not on file     Non-medical: Not on file   Tobacco Use     Smoking status: Never Smoker     Smokeless tobacco: Never Used   Substance and Sexual Activity     Alcohol use: No     Comment: rarely     Drug use: No     Sexual activity: Yes     Partners: Male   Lifestyle     Physical activity:     Days per week: Not on file     Minutes per session: Not on file     Stress: Not on file   Relationships     Social connections:     Talks on phone: Not on file     Gets together: Not on file     Attends Mu-[employer] service: Not on file     Active member of club or organization: Not on file     Attends meetings of clubs or organizations: Not on file     Relationship status: Not on file     Intimate partner violence:     Fear of current or ex partner: Not on file     Emotionally abused: Not on file     Physically abused: Not on file     Forced sexual activity: Not on file   Other Topics Concern     Parent/sibling w/ CABG, MI or angioplasty  before 65F 55M? No   Social History Narrative     Not on file       Family History -   Family History   Problem Relation Age of Onset     Hypertension Father        Review of Systems - As per HPI and PMHx, otherwise 10+ system review of the head and neck, and general constitution is negative.    Physical Exam  /88 (BP Location: Right arm, Patient Position: Chair, Cuff Size: Adult Large)   Pulse 82   Resp 18   SpO2 98%     General - The patient is well nourished and well developed, and appears to have good nutritional status.  Alert and oriented to person and place, answers questions and cooperates with examination appropriately.   Head and Face - Normocephalic and atraumatic, with no gross asymmetry noted of the contour of the facial features.  The facial nerve is intact, with strong symmetric movements.  Voice and Breathing - The patient was breathing comfortably without the use of accessory muscles. There was no wheezing, stridor, or stertor.  The patients voice was clear and strong, and had appropriate pitch and quality.  Ears - the ears were examined with the binocular microscope.  The RIGHT ear is in a post surgical state with variegated density due to previous tympanoplasty.  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  No effusion seen.  The LEFT tympanic membrane is healthy, no retraction or perforation.  Eyes - Extraocular movements intact, and the pupils were reactive to light.  Sclera were not icteric or injected, conjunctiva were pink and moist.  Mouth - Examination of the oral cavity showed pink, healthy oral mucosa. No lesions or ulcerations noted.  The tongue was mobile and midline, and the dentition were in good condition.    Throat - The walls of the oropharynx were smooth, pink, moist, symmetric, and had no lesions or ulcerations.  The tonsillar pillars and soft palate were  symmetric.  The uvula was midline on elevation.    Neck - Normal midline excursion of the laryngotracheal complex during swallowing.  Full range of motion on passive movement.  Palpation of the occipital, submental, submandibular, internal jugular chain, and supraclavicular nodes did not demonstrate any abnormal lymph nodes or masses.  The carotid pulse was palpable bilaterally.  Palpation of the thyroid was soft and smooth, with no nodules or goiter appreciated.  The trachea was mobile and midline.  Nose - External contour is symmetric, no gross deflection or scars.  Nasal mucosa is pink and moist with no abnormal mucus.  The septum was midline and non-obstructive, turbinates of normal size and position.  No polyps, masses, or purulence noted on examination.    Audiologic Studies - An audiogram shows mild sensorineural hearing loss bilaterally with some mild conductive hearing loss bilaterally.       A/P - Noe Fitzgerald is a 49 year old female  (R42) Dizziness  (primary encounter diagnosis)  (H92.03) Otalgia of both ears    Although there is certainly scarring on the tympanic membrane's from her pervious ear surgery, I think that there are actually two separate issues here.  Given her symptoms of light headedness, that are getting better now that her HTN medications have been adjusted, I think that was the issue.  But I think that this ear pain is actually not ear disease, as my testing and microscopic exam look good today.    Based on today's history and physical exam my primary diagnosis is of temporomandibular syndrome.  I have discussed the etiology of TMJ, and the reasons why referred pain can mimic symptoms of ear disease, headaches, and even sinusitis.  i have given the patient an instructional sheet of things to be tried at home, as well a referral to a TMJ specialist should it be needed.  Finally, I counseled the patient that should the therapy not help, or should the symptoms change, that they should  return to me.      Again, thank you for allowing me to participate in the care of your patient.        Sincerely,        Enoch Irvin MD

## 2019-06-03 ENCOUNTER — HOSPITAL ENCOUNTER (EMERGENCY)
Facility: CLINIC | Age: 49
Discharge: HOME | End: 2019-06-03
Attending: EMERGENCY MEDICINE | Admitting: EMERGENCY MEDICINE
Payer: COMMERCIAL

## 2019-06-03 ENCOUNTER — APPOINTMENT (OUTPATIENT)
Dept: GENERAL RADIOLOGY | Facility: CLINIC | Age: 49
End: 2019-06-03
Attending: EMERGENCY MEDICINE
Payer: COMMERCIAL

## 2019-06-03 VITALS
HEIGHT: 65 IN | SYSTOLIC BLOOD PRESSURE: 136 MMHG | HEART RATE: 69 BPM | RESPIRATION RATE: 18 BRPM | BODY MASS INDEX: 39.99 KG/M2 | OXYGEN SATURATION: 94 % | DIASTOLIC BLOOD PRESSURE: 91 MMHG | TEMPERATURE: 98 F | WEIGHT: 240 LBS

## 2019-06-03 DIAGNOSIS — R07.9 CHEST PAIN, UNSPECIFIED TYPE: ICD-10-CM

## 2019-06-03 DIAGNOSIS — R10.13 EPIGASTRIC PAIN: ICD-10-CM

## 2019-06-03 LAB
ALBUMIN SERPL-MCNC: 3.7 G/DL (ref 3.4–5)
ALP SERPL-CCNC: 66 U/L (ref 40–150)
ALT SERPL W P-5'-P-CCNC: 21 U/L (ref 0–50)
ANION GAP SERPL CALCULATED.3IONS-SCNC: 7 MMOL/L (ref 3–14)
AST SERPL W P-5'-P-CCNC: 11 U/L (ref 0–45)
BASOPHILS # BLD AUTO: 0 10E9/L (ref 0–0.2)
BASOPHILS NFR BLD AUTO: 0.1 %
BILIRUB SERPL-MCNC: 0.9 MG/DL (ref 0.2–1.3)
BUN SERPL-MCNC: 9 MG/DL (ref 7–30)
CALCIUM SERPL-MCNC: 8.6 MG/DL (ref 8.5–10.1)
CHLORIDE SERPL-SCNC: 104 MMOL/L (ref 94–109)
CO2 SERPL-SCNC: 24 MMOL/L (ref 20–32)
CREAT SERPL-MCNC: 0.99 MG/DL (ref 0.52–1.04)
DIFFERENTIAL METHOD BLD: NORMAL
EOSINOPHIL # BLD AUTO: 0 10E9/L (ref 0–0.7)
EOSINOPHIL NFR BLD AUTO: 0.4 %
ERYTHROCYTE [DISTWIDTH] IN BLOOD BY AUTOMATED COUNT: 13.1 % (ref 10–15)
GFR SERPL CREATININE-BSD FRML MDRD: 67 ML/MIN/{1.73_M2}
GLUCOSE SERPL-MCNC: 108 MG/DL (ref 70–99)
HCG SERPL QL: NEGATIVE
HCT VFR BLD AUTO: 41.8 % (ref 35–47)
HGB BLD-MCNC: 13.7 G/DL (ref 11.7–15.7)
IMM GRANULOCYTES # BLD: 0 10E9/L (ref 0–0.4)
IMM GRANULOCYTES NFR BLD: 0.3 %
LIPASE SERPL-CCNC: 66 U/L (ref 73–393)
LYMPHOCYTES # BLD AUTO: 0.8 10E9/L (ref 0.8–5.3)
LYMPHOCYTES NFR BLD AUTO: 10.7 %
MCH RBC QN AUTO: 28.5 PG (ref 26.5–33)
MCHC RBC AUTO-ENTMCNC: 32.8 G/DL (ref 31.5–36.5)
MCV RBC AUTO: 87 FL (ref 78–100)
MONOCYTES # BLD AUTO: 0.4 10E9/L (ref 0–1.3)
MONOCYTES NFR BLD AUTO: 6.1 %
NEUTROPHILS # BLD AUTO: 6 10E9/L (ref 1.6–8.3)
NEUTROPHILS NFR BLD AUTO: 82.4 %
NRBC # BLD AUTO: 0 10*3/UL
NRBC BLD AUTO-RTO: 0 /100
PLATELET # BLD AUTO: 212 10E9/L (ref 150–450)
POTASSIUM SERPL-SCNC: 3.8 MMOL/L (ref 3.4–5.3)
PROT SERPL-MCNC: 7.7 G/DL (ref 6.8–8.8)
RBC # BLD AUTO: 4.81 10E12/L (ref 3.8–5.2)
SODIUM SERPL-SCNC: 135 MMOL/L (ref 133–144)
TROPONIN I SERPL-MCNC: <0.015 UG/L (ref 0–0.04)
TROPONIN I SERPL-MCNC: <0.015 UG/L (ref 0–0.04)
WBC # BLD AUTO: 7.3 10E9/L (ref 4–11)

## 2019-06-03 PROCEDURE — 80053 COMPREHEN METABOLIC PANEL: CPT | Performed by: EMERGENCY MEDICINE

## 2019-06-03 PROCEDURE — 84703 CHORIONIC GONADOTROPIN ASSAY: CPT | Performed by: EMERGENCY MEDICINE

## 2019-06-03 PROCEDURE — 83690 ASSAY OF LIPASE: CPT | Performed by: EMERGENCY MEDICINE

## 2019-06-03 PROCEDURE — 25000125 ZZHC RX 250: Performed by: EMERGENCY MEDICINE

## 2019-06-03 PROCEDURE — 99285 EMERGENCY DEPT VISIT HI MDM: CPT | Mod: 25

## 2019-06-03 PROCEDURE — 93005 ELECTROCARDIOGRAM TRACING: CPT

## 2019-06-03 PROCEDURE — 71046 X-RAY EXAM CHEST 2 VIEWS: CPT

## 2019-06-03 PROCEDURE — 25000132 ZZH RX MED GY IP 250 OP 250 PS 637: Performed by: EMERGENCY MEDICINE

## 2019-06-03 PROCEDURE — 93010 ELECTROCARDIOGRAM REPORT: CPT | Mod: Z6 | Performed by: EMERGENCY MEDICINE

## 2019-06-03 PROCEDURE — 84484 ASSAY OF TROPONIN QUANT: CPT | Performed by: EMERGENCY MEDICINE

## 2019-06-03 PROCEDURE — 85025 COMPLETE CBC W/AUTO DIFF WBC: CPT | Performed by: EMERGENCY MEDICINE

## 2019-06-03 PROCEDURE — 99284 EMERGENCY DEPT VISIT MOD MDM: CPT | Mod: 25 | Performed by: EMERGENCY MEDICINE

## 2019-06-03 RX ADMIN — LIDOCAINE HYDROCHLORIDE 30 ML: 20 SOLUTION ORAL; TOPICAL at 08:42

## 2019-06-03 ASSESSMENT — MIFFLIN-ST. JEOR: SCORE: 1709.51

## 2019-06-03 NOTE — DISCHARGE INSTRUCTIONS
Return if symptoms worsen or new symptoms develop.  Follow-up with primary care physician later this week for recheck for follow-up of ultrasound and possible stress test set up.  Drink plenty of fluids.  If any further chest pain shortness of breath changing abdominal pain vomiting fevers chills blood in stool diarrhea or other symptoms present please return for further evaluation and care.

## 2019-06-03 NOTE — ED PROVIDER NOTES
History     Chief Complaint   Patient presents with     Chest Pain     HPI  Noe Fitzgerald is a 49 year old female who presents the emergency department complaining of epigastric/lower midsternal chest pain since last night.  Patient states several people in her house have stomach flu and she got it last night.  She vomited twice and has had loose watery stools.  She denies any significant abdominal pain.  She has had some mild cramping in the upper abdomen.  She states she is having these epigastric/substernal pain that is persisted throughout the night.  Pain does not change with movement.  She is slightly short of breath denies any fevers or chills has not had a headache denies visual changes.  She has not had a cough.  She currently denies any back pain.  She has not had any urinary symptoms weakness in extremity.  He rates her pain a 3 out of 10.  Allergies:  Allergies   Allergen Reactions     Bactrim [Sulfamethoxazole W/Trimethoprim] Rash       Problem List:    Patient Active Problem List    Diagnosis Date Noted     Obesity (BMI 35.0-39.9) with comorbidity (H) 10/25/2018     Priority: Medium     Papanicolaou smear of cervix with atypical squamous cells of undetermined significance (ASC-US) 02/17/2017     Priority: Medium     Ascending aorta dilatation (H) 09/12/2016     Priority: Medium     Per cardiology, put on beta blocker and recheck echocardiogram in one year       ASCUS with positive high risk HPV cervical 12/02/2015     Priority: Medium     11/23/2015:Pap--ASCUS, +HR HPV.   12/08/15 Colpo benign. Plan: co-test in 12 months.  2/17/17:Pap--NIL, neg HPV. Repeat Pap+HPV in 3 yrs. Due 2020       Hypertension goal BP (blood pressure) < 140/90 10/17/2012     Priority: Medium     CARDIOVASCULAR SCREENING; LDL GOAL LESS THAN 160 10/31/2010     Priority: Medium     Large breasts 10/27/2008     Priority: Medium        Past Medical History:    Past Medical History:   Diagnosis Date     ASCUS with positive  "high risk HPV 11/2015     Gynecological examination      S/P LEEP of cervix 1991     Supervision of normal first pregnancy      Vaginitis and vulvovaginitis, unspecified        Past Surgical History:    Past Surgical History:   Procedure Laterality Date     ESOPHAGOSCOPY, GASTROSCOPY, DUODENOSCOPY (EGD), COMBINED N/A 1/26/2017    Procedure: COMBINED ESOPHAGOSCOPY, GASTROSCOPY, DUODENOSCOPY (EGD);  Surgeon: Damian Ames MD;  Location: [facility]     LEEP TX, CERVICAL  1991    LEEP TX Cervical     MAMMOPLASTY REDUCTION BILATERAL Bilateral 5/22/2017    Procedure: MAMMOPLASTY REDUCTION BILATERAL;  Bilateral Breast Reduction Mammoplasty;  Surgeon: Franny Reynolds MD;  Location: [facility]     SURGICAL HISTORY OF -       Ear surgery     SURGICAL HISTORY OF -       Foot surgery       Family History:    Family History   Problem Relation Age of Onset     Hypertension Father        Social History:  Marital Status:   [2]  Social History     Tobacco Use     Smoking status: Never Smoker     Smokeless tobacco: Never Used     Tobacco comment: Pt experiences second hand smoke at work    Substance Use Topics     Alcohol use: No     Comment: rarely     Drug use: No        Medications:      fluticasone (FLONASE) 50 MCG/ACT nasal spray   lisinopril (PRINIVIL/ZESTRIL) 20 MG tablet   metoprolol succinate ER (TOPROL-XL) 25 MG 24 hr tablet   ranitidine (ZANTAC) 150 MG capsule         Review of Systems  All systems reviewed and other than pertinent positives and negatives in HPI all other systems are negative.  Physical Exam   BP: (!) 150/96  Pulse: 96  Temp: 98  F (36.7  C)  Resp: 20  Height: 165.1 cm (5' 5\")  Weight: 108.9 kg (240 lb)  SpO2: 95 %      Physical Exam   Constitutional: She appears well-developed and well-nourished. No distress.   HENT:   Head: Normocephalic.   Mouth/Throat: Oropharynx is clear and moist.   Eyes: Conjunctivae are normal.   Neck: Normal range of motion. Neck supple. No JVD present.   Cardiovascular: " Normal rate, regular rhythm, normal heart sounds and intact distal pulses.   No murmur heard.  Pulmonary/Chest: Effort normal and breath sounds normal. No stridor. No respiratory distress. She has no wheezes. She exhibits no tenderness.   Abdominal: Soft. Bowel sounds are normal. She exhibits no distension.   Mild tenderness to palpation of the epigastric region . No guarding or rebound ,bowel sounds are positive.    Musculoskeletal: Normal range of motion. She exhibits no edema.   Neurological: She is alert. She exhibits normal muscle tone.   Skin: Skin is warm and dry. Capillary refill takes less than 2 seconds. No rash noted.   Psychiatric: She has a normal mood and affect.   Nursing note and vitals reviewed.      ED Course        Procedures               EKG Interpretation:      Interpreted by Erich Klein  Rhythm: normal sinus   Rate: normal  Axis: normal  Ectopy: none  Conduction: normal  ST Segments/ T Waves: No ST-T wave changes  Q Waves: none  Comparison to prior: Unchanged from 5/1/17    Clinical Impression: normal EKG          Critical Care time:  none               Results for orders placed or performed during the hospital encounter of 06/03/19 (from the past 24 hour(s))   CBC with platelets differential   Result Value Ref Range    WBC 7.3 4.0 - 11.0 10e9/L    RBC Count 4.81 3.8 - 5.2 10e12/L    Hemoglobin 13.7 11.7 - 15.7 g/dL    Hematocrit 41.8 35.0 - 47.0 %    MCV 87 78 - 100 fl    MCH 28.5 26.5 - 33.0 pg    MCHC 32.8 31.5 - 36.5 g/dL    RDW 13.1 10.0 - 15.0 %    Platelet Count 212 150 - 450 10e9/L    Diff Method Automated Method     % Neutrophils 82.4 %    % Lymphocytes 10.7 %    % Monocytes 6.1 %    % Eosinophils 0.4 %    % Basophils 0.1 %    % Immature Granulocytes 0.3 %    Nucleated RBCs 0 0 /100    Absolute Neutrophil 6.0 1.6 - 8.3 10e9/L    Absolute Lymphocytes 0.8 0.8 - 5.3 10e9/L    Absolute Monocytes 0.4 0.0 - 1.3 10e9/L    Absolute Eosinophils 0.0 0.0 - 0.7 10e9/L    Absolute  Basophils 0.0 0.0 - 0.2 10e9/L    Abs Immature Granulocytes 0.0 0 - 0.4 10e9/L    Absolute Nucleated RBC 0.0      Results for orders placed or performed during the hospital encounter of 06/03/19   Chest XR,  PA & LAT    Narrative    CHEST TWO VIEWS Gemma 3, 2019 7:59 AM     HISTORY: Shortness of breath.    COMPARISON: None.      Impression    IMPRESSION: PA and lateral views of the chest. Lungs are clear. Heart  is normal in size. No effusions are evident. No pneumothorax.    LIGIA PAUL MD       Medications   lidocaine HCl (XYLOCAINE) 2 % 15 mL, alum & mag hydroxide-simethicone (MYLANTA ES/MAALOX  ES) 15 mL GI Cocktail (30 mLs Oral Given 6/3/19 0842)       Assessments & Plan (with Medical Decision Making) records were reviewed.  Labs were obtained.  EKG was obtained along with chest x-ray.  EKG revealed a normal sinus rhythm with no abnormalities.  There is no change from previous.  White count was 7.3.  Hemoglobin 13.7 platelet count was 212.  There is no left shift.  Comprehensive metabolic panel was unremarkable.  Lipase was within normal limits.  Pregnancy test was negative troponin was less than 0.015.  Patient was given a GI cocktail due to the epigastric pain and this resolved her pain.  Patient was observed for some time a repeat troponin was done several hours after the first troponin and was also unremarkable.  Chest x-ray was without abnormality.  Patient has no significant cardiac risk factors and has had this pain for over 8 hours.  I do not think this is cardiac in nature.  I have discussed findings and plan with family and patient.  She feels comfortable following up with her primary care this week for recheck and possible stress test set up.  She will return if any further pain in her abdomen or chest occur.     I have reviewed the nursing notes.    I have reviewed the findings, diagnosis, plan and need for follow up with the patient.          Medication List      There are no discharge medications  for this visit.         Final diagnoses:   Chest pain, unspecified type   Epigastric pain       6/3/2019   [facility] EMERGENCY DEPARTMENT     Erich Klein MD  06/05/19 3658

## 2019-06-03 NOTE — ED AVS SNAPSHOT
[facility] Emergency Department  [address] [address]  Phone:  [phone]  Fax:  [phone]                                    Noe Fitzgerald   MRN: [id number]    Department:  [facility] Emergency Department   Date of Visit:  6/3/2019           After Visit Summary Signature Page    I have received my discharge instructions, and my questions have been answered. I have discussed any challenges I see with this plan with the nurse or doctor.    ..........................................................................................................................................  Patient/Patient Representative Signature      ..........................................................................................................................................  Patient Representative Print Name and Relationship to Patient    ..................................................               ................................................  Date                                   Time    ..........................................................................................................................................  Reviewed by Signature/Title    ...................................................              ..............................................  Date                                               Time          22EPIC Rev 08/18

## 2019-06-03 NOTE — ED NOTES
Patient c/o mid sternal chest pain since 2300 last night with nausea and diarrhea.  Does feel mild SOA   No pain with deep inspiration.  No recent illness.  GI flu has gone around family

## 2019-08-30 DIAGNOSIS — I77.810 ASCENDING AORTA DILATATION (H): ICD-10-CM

## 2019-08-30 RX ORDER — METOPROLOL SUCCINATE 25 MG/1
TABLET, EXTENDED RELEASE ORAL
Qty: 90 TABLET | Refills: 1 | Status: SHIPPED | OUTPATIENT
Start: 2019-08-30 | End: 2020-02-03

## 2019-08-30 NOTE — TELEPHONE ENCOUNTER
Routing refill request to provider for review/approval because:  BPs not always at goal    BP Readings from Last 6 Encounters:   06/03/19 (!) 136/91   03/22/19 126/88   02/06/19 134/86   12/31/18 (!) 152/98   12/03/18 138/88   11/19/18 (!) 138/100     Denae BRADLEY RN

## 2019-08-30 NOTE — TELEPHONE ENCOUNTER
"Requested Prescriptions   Pending Prescriptions Disp Refills     metoprolol succinate ER (TOPROL-XL) 25 MG 24 hr tablet [Pharmacy Med Name: METOPROLOL ER 25MG  TAB] 90 tablet 1     Sig: TAKE 1 TABLET BY MOUTH ONCE DAILY       Beta-Blockers Protocol Failed - 8/30/2019 11:57 AM        Failed - Blood pressure under 140/90 in past 12 months     BP Readings from Last 3 Encounters:   06/03/19 (!) 136/91   03/22/19 126/88   02/06/19 134/86                 Passed - Patient is age 6 or older        Passed - Recent (12 mo) or future (30 days) visit within the authorizing provider's specialty     Patient had office visit in the last 12 months or has a visit in the next 30 days with authorizing provider or within the authorizing provider's specialty.  See \"Patient Info\" tab in inbasket, or \"Choose Columns\" in Meds & Orders section of the refill encounter.              Passed - Medication is active on med list      metoprolol succinate ER (TOPROL-XL) 25 MG 24 hr tablet  Last Written Prescription Date:  02/25/2019  Last Fill Quantity: 90 tablet,  # refills: 1   Last office visit: 2/6/2019 with prescribing provider:  TORIBIO Horner   Future Office Visit:      Michelle Knox RT (R) (M)      "

## 2019-10-13 DIAGNOSIS — I10 HYPERTENSION GOAL BP (BLOOD PRESSURE) < 140/90: ICD-10-CM

## 2019-10-13 NOTE — TELEPHONE ENCOUNTER
"Requested Prescriptions   Pending Prescriptions Disp Refills     lisinopril (PRINIVIL/ZESTRIL) 20 MG tablet [Pharmacy Med Name: LISINOPRIL 20MG     TAB]  Last Written Prescription Date:  07/11/19  Last Fill Quantity: 90,  # refills: 0   Last office visit: 2/6/2019 with prescribing provider:  SALAZAR Horner   Future Office Visit:     90 tablet 0     Sig: TAKE 1 TABLET BY MOUTH ONCE DAILY       ACE Inhibitors (Including Combos) Protocol Failed - 10/13/2019 11:04 AM        Failed - Blood pressure under 140/90 in past 12 months     BP Readings from Last 3 Encounters:   06/03/19 (!) 136/91   03/22/19 126/88   02/06/19 134/86                 Passed - Recent (12 mo) or future (30 days) visit within the authorizing provider's specialty     Patient has had an office visit with the authorizing provider or a provider within the authorizing providers department within the previous 12 mos or has a future within next 30 days. See \"Patient Info\" tab in inbasket, or \"Choose Columns\" in Meds & Orders section of the refill encounter.              Passed - Medication is active on med list        Passed - Patient is age 18 or older        Passed - No active pregnancy on record        Passed - Normal serum creatinine on file in past 12 months     Recent Labs   Lab Test 06/03/19  0659   CR 0.99             Passed - Normal serum potassium on file in past 12 months     Recent Labs   Lab Test 06/03/19  0659   POTASSIUM 3.8             Passed - No positive pregnancy test within past 12 months          "

## 2019-10-14 RX ORDER — LISINOPRIL 20 MG/1
TABLET ORAL
Qty: 90 TABLET | Refills: 0 | Status: SHIPPED | OUTPATIENT
Start: 2019-10-14 | End: 2020-01-17

## 2019-11-04 ENCOUNTER — HEALTH MAINTENANCE LETTER (OUTPATIENT)
Age: 49
End: 2019-11-04

## 2019-11-25 ENCOUNTER — ALLIED HEALTH/NURSE VISIT (OUTPATIENT)
Dept: FAMILY MEDICINE | Facility: CLINIC | Age: 49
End: 2019-11-25
Payer: COMMERCIAL

## 2019-11-25 DIAGNOSIS — Z53.9 DIAGNOSIS NOT YET DEFINED: Primary | ICD-10-CM

## 2019-11-25 PROCEDURE — 99207 ZZC NO CHARGE NURSE ONLY: CPT

## 2019-11-25 NOTE — PROGRESS NOTES
Noe stopped to  vegetables - we were out and so she made a mammogram appointment. Adwoa Jackson MA

## 2019-12-02 ENCOUNTER — ANCILLARY PROCEDURE (OUTPATIENT)
Dept: MAMMOGRAPHY | Facility: CLINIC | Age: 49
End: 2019-12-02
Payer: COMMERCIAL

## 2019-12-02 DIAGNOSIS — Z12.31 VISIT FOR SCREENING MAMMOGRAM: ICD-10-CM

## 2019-12-02 PROCEDURE — 77067 SCR MAMMO BI INCL CAD: CPT | Mod: TC

## 2019-12-02 PROCEDURE — 77063 BREAST TOMOSYNTHESIS BI: CPT | Mod: TC

## 2020-01-17 DIAGNOSIS — I10 HYPERTENSION GOAL BP (BLOOD PRESSURE) < 140/90: ICD-10-CM

## 2020-01-17 RX ORDER — LISINOPRIL 20 MG/1
TABLET ORAL
Qty: 30 TABLET | Refills: 0 | Status: SHIPPED | OUTPATIENT
Start: 2020-01-17 | End: 2020-02-03

## 2020-01-17 NOTE — TELEPHONE ENCOUNTER
"Requested Prescriptions   Pending Prescriptions Disp Refills     lisinopril (PRINIVIL/ZESTRIL) 20 MG tablet [Pharmacy Med Name: Lisinopril 20 MG Oral Tablet]  0     Sig: TAKE 1 TABLET BY MOUTH ONCE DAILY -  NEEDS  APPOINTMENT  FOR  FURTHE  REFILLS       ACE Inhibitors (Including Combos) Protocol Failed - 1/17/2020  1:21 PM        Failed - Blood pressure under 140/90 in past 12 months     BP Readings from Last 3 Encounters:   06/03/19 (!) 136/91   03/22/19 126/88   02/06/19 134/86                 Failed - Recent (12 mo) or future (30 days) visit within the authorizing provider's specialty     Patient has had an office visit with the authorizing provider or a provider within the authorizing providers department within the previous 12 mos or has a future within next 30 days. See \"Patient Info\" tab in inbasket, or \"Choose Columns\" in Meds & Orders section of the refill encounter.              Passed - Medication is active on med list        Passed - Patient is age 18 or older        Passed - No active pregnancy on record        Passed - Normal serum creatinine on file in past 12 months     Recent Labs   Lab Test 06/03/19  0659   CR 0.99             Passed - Normal serum potassium on file in past 12 months     Recent Labs   Lab Test 06/03/19  0659   POTASSIUM 3.8             Passed - No positive pregnancy test within past 12 months        lisinopril (PRINIVIL/ZESTRIL) 20 MG tablet  Last Written Prescription Date:  10/14/2019  Last Fill Quantity: 90 tablet,  # refills: 0   Last office visit: 11/25/2019 with prescribing provider:  02/06/2019 TORIBIO Horner   Future Office Visit:      Michelle TAVAREZ (R) (M)    "

## 2020-02-03 ENCOUNTER — OFFICE VISIT (OUTPATIENT)
Dept: FAMILY MEDICINE | Facility: CLINIC | Age: 50
End: 2020-02-03
Payer: COMMERCIAL

## 2020-02-03 VITALS
DIASTOLIC BLOOD PRESSURE: 88 MMHG | SYSTOLIC BLOOD PRESSURE: 124 MMHG | WEIGHT: 235 LBS | BODY MASS INDEX: 39.15 KG/M2 | RESPIRATION RATE: 16 BRPM | HEART RATE: 83 BPM | OXYGEN SATURATION: 94 % | HEIGHT: 65 IN | TEMPERATURE: 98.4 F

## 2020-02-03 DIAGNOSIS — Z12.11 SPECIAL SCREENING FOR MALIGNANT NEOPLASMS, COLON: ICD-10-CM

## 2020-02-03 DIAGNOSIS — Z12.4 SCREENING FOR CERVICAL CANCER: ICD-10-CM

## 2020-02-03 DIAGNOSIS — E66.01 MORBID OBESITY (H): ICD-10-CM

## 2020-02-03 DIAGNOSIS — Z00.00 ROUTINE GENERAL MEDICAL EXAMINATION AT A HEALTH CARE FACILITY: Primary | ICD-10-CM

## 2020-02-03 DIAGNOSIS — D25.0 SUBMUCOUS LEIOMYOMA OF UTERUS: ICD-10-CM

## 2020-02-03 DIAGNOSIS — I77.810 ASCENDING AORTA DILATATION (H): ICD-10-CM

## 2020-02-03 DIAGNOSIS — L98.9 SKIN LESION: ICD-10-CM

## 2020-02-03 DIAGNOSIS — Z13.6 CARDIOVASCULAR SCREENING; LDL GOAL LESS THAN 160: ICD-10-CM

## 2020-02-03 DIAGNOSIS — N94.9 ADNEXAL FULLNESS: ICD-10-CM

## 2020-02-03 DIAGNOSIS — I10 HYPERTENSION GOAL BP (BLOOD PRESSURE) < 140/90: ICD-10-CM

## 2020-02-03 DIAGNOSIS — Z01.419 ENCOUNTER FOR GYNECOLOGICAL EXAMINATION WITH PAPANICOLAOU SMEAR OF CERVIX: ICD-10-CM

## 2020-02-03 LAB
ALBUMIN SERPL-MCNC: 3.5 G/DL (ref 3.4–5)
ALP SERPL-CCNC: 63 U/L (ref 40–150)
ALT SERPL W P-5'-P-CCNC: 22 U/L (ref 0–50)
ANION GAP SERPL CALCULATED.3IONS-SCNC: 4 MMOL/L (ref 3–14)
AST SERPL W P-5'-P-CCNC: 15 U/L (ref 0–45)
BILIRUB SERPL-MCNC: 0.5 MG/DL (ref 0.2–1.3)
BUN SERPL-MCNC: 10 MG/DL (ref 7–30)
CALCIUM SERPL-MCNC: 8.5 MG/DL (ref 8.5–10.1)
CHLORIDE SERPL-SCNC: 106 MMOL/L (ref 94–109)
CHOLEST SERPL-MCNC: 232 MG/DL
CO2 SERPL-SCNC: 26 MMOL/L (ref 20–32)
CREAT SERPL-MCNC: 0.81 MG/DL (ref 0.52–1.04)
ERYTHROCYTE [DISTWIDTH] IN BLOOD BY AUTOMATED COUNT: 13.7 % (ref 10–15)
GFR SERPL CREATININE-BSD FRML MDRD: 84 ML/MIN/{1.73_M2}
GLUCOSE SERPL-MCNC: 84 MG/DL (ref 70–99)
HCT VFR BLD AUTO: 39.6 % (ref 35–47)
HDLC SERPL-MCNC: 43 MG/DL
HGB BLD-MCNC: 13.2 G/DL (ref 11.7–15.7)
LDLC SERPL CALC-MCNC: 152 MG/DL
MCH RBC QN AUTO: 29.6 PG (ref 26.5–33)
MCHC RBC AUTO-ENTMCNC: 33.3 G/DL (ref 31.5–36.5)
MCV RBC AUTO: 89 FL (ref 78–100)
NONHDLC SERPL-MCNC: 189 MG/DL
PLATELET # BLD AUTO: 247 10E9/L (ref 150–450)
POTASSIUM SERPL-SCNC: 4 MMOL/L (ref 3.4–5.3)
PROT SERPL-MCNC: 7.3 G/DL (ref 6.8–8.8)
RBC # BLD AUTO: 4.46 10E12/L (ref 3.8–5.2)
SODIUM SERPL-SCNC: 136 MMOL/L (ref 133–144)
TRIGL SERPL-MCNC: 184 MG/DL
WBC # BLD AUTO: 6.6 10E9/L (ref 4–11)

## 2020-02-03 PROCEDURE — 36415 COLL VENOUS BLD VENIPUNCTURE: CPT | Performed by: FAMILY MEDICINE

## 2020-02-03 PROCEDURE — 85027 COMPLETE CBC AUTOMATED: CPT | Performed by: FAMILY MEDICINE

## 2020-02-03 PROCEDURE — 80061 LIPID PANEL: CPT | Performed by: FAMILY MEDICINE

## 2020-02-03 PROCEDURE — G0145 SCR C/V CYTO,THINLAYER,RESCR: HCPCS | Performed by: FAMILY MEDICINE

## 2020-02-03 PROCEDURE — 80053 COMPREHEN METABOLIC PANEL: CPT | Performed by: FAMILY MEDICINE

## 2020-02-03 PROCEDURE — 99396 PREV VISIT EST AGE 40-64: CPT | Performed by: FAMILY MEDICINE

## 2020-02-03 PROCEDURE — 87624 HPV HI-RISK TYP POOLED RSLT: CPT | Performed by: FAMILY MEDICINE

## 2020-02-03 RX ORDER — TRIAMCINOLONE ACETONIDE 1 MG/G
OINTMENT TOPICAL 2 TIMES DAILY
Qty: 30 G | Refills: 1 | Status: SHIPPED | OUTPATIENT
Start: 2020-02-03 | End: 2021-05-27

## 2020-02-03 RX ORDER — METOPROLOL SUCCINATE 25 MG/1
25 TABLET, EXTENDED RELEASE ORAL DAILY
Qty: 90 TABLET | Refills: 3 | Status: SHIPPED | OUTPATIENT
Start: 2020-02-03 | End: 2021-03-09

## 2020-02-03 RX ORDER — LISINOPRIL 20 MG/1
TABLET ORAL
Qty: 90 TABLET | Refills: 3 | Status: SHIPPED | OUTPATIENT
Start: 2020-02-03 | End: 2021-02-15

## 2020-02-03 ASSESSMENT — ENCOUNTER SYMPTOMS
HEMATURIA: 0
DIZZINESS: 0
HEMATOCHEZIA: 0
DIARRHEA: 0
CONSTIPATION: 0
CHILLS: 0
EYE PAIN: 0
NERVOUS/ANXIOUS: 0
ABDOMINAL PAIN: 0
COUGH: 0
FREQUENCY: 0
FEVER: 0

## 2020-02-03 ASSESSMENT — MIFFLIN-ST. JEOR: SCORE: 1681.83

## 2020-02-03 NOTE — NURSING NOTE
"Chief Complaint   Patient presents with     Physical       Initial /88 (BP Location: Right arm, Patient Position: Chair, Cuff Size: Adult Large)   Pulse 83   Temp 98.4  F (36.9  C) (Tympanic)   Resp 16   Ht 1.651 m (5' 5\")   Wt 106.6 kg (235 lb)   LMP 01/31/2020   SpO2 94%   BMI 39.11 kg/m   Estimated body mass index is 39.11 kg/m  as calculated from the following:    Height as of this encounter: 1.651 m (5' 5\").    Weight as of this encounter: 106.6 kg (235 lb).    Patient presents to the clinic using No DME    Health Maintenance that is potentially due pending provider review:  Pap Smear and Colonoscopy/FIT    Possibly completing today per provider review, phone number for colonoscopy given    Is there anyone who you would like to be able to receive your results? No  If yes have patient fill out NATACHA    "

## 2020-02-03 NOTE — PROGRESS NOTES
SUBJECTIVE:   CC: Noe Fitzgerald is an 50 year old woman who presents for preventive health visit.     Healthy Habits:     Getting at least 3 servings of Calcium per day:  Yes    Bi-annual eye exam:  NO    Dental care twice a year:  Yes    Sleep apnea or symptoms of sleep apnea:  None    Diet:  Regular (no restrictions)    Frequency of exercise:  2-3 days/week    Duration of exercise:  15-30 minutes    Taking medications regularly:  Yes    Medication side effects:  None    PHQ-2 Total Score: 0    Additional concerns today:  No      Lab Results   Component Value Date    PAP NIL 02/17/2017    PAP ASC-US 11/23/2015    PAP NIL 02/21/2013      hypertension   BP Readings from Last 6 Encounters:   02/03/20 124/88   06/03/19 (!) 136/91   03/22/19 126/88   02/06/19 134/86   12/31/18 (!) 152/98   12/03/18 138/88      On lisinopril, metoprolol  No chest pain or shortness of breath     Today's PHQ-2 Score:   PHQ-2 ( 1999 [employer]) 2/3/2020   Q1: Little interest or pleasure in doing things 0   Q2: Feeling down, depressed or hopeless 0   PHQ-2 Score 0   Q1: Little interest or pleasure in doing things Not at all   Q2: Feeling down, depressed or hopeless Not at all   PHQ-2 Score 0       Abuse: Current or Past(Physical, Sexual or Emotional)- No  Do you feel safe in your environment? Yes        Social History     Tobacco Use     Smoking status: Never Smoker     Smokeless tobacco: Never Used     Tobacco comment: Pt experiences second hand smoke at work    Substance Use Topics     Alcohol use: No     Comment: rarely         Alcohol Use 2/3/2020   Prescreen: >3 drinks/day or >7 drinks/week? No   Prescreen: >3 drinks/day or >7 drinks/week? -   No flowsheet data found.    Reviewed orders with patient.  Reviewed health maintenance and updated orders accordingly - Yes  BP Readings from Last 3 Encounters:   02/03/20 124/88   06/03/19 (!) 136/91   03/22/19 126/88    Wt Readings from Last 3 Encounters:   02/03/20 106.6 kg (235 lb)    19 108.9 kg (240 lb)   19 109.9 kg (242 lb 3.2 oz)                    Mammogram Screening: Patient over age 50, mutual decision to screen reflected in health maintenance.    Pertinent mammograms are reviewed under the imaging tab.  History of abnormal Pap smear:   NO - age 30- 65 PAP every 3 years recommended  Last 3 Pap and HPV Results:   PAP / HPV Latest Ref Rng & Units 2015   PAP - NIL ASC-US(A) NIL   HPV 16 DNA NEG Negative Negative -   HPV 18 DNA NEG Negative Negative -   OTHER HR HPV NEG Negative Positive(A) -     PAP / HPV Latest Ref Rng & Units 2015   PAP - NIL ASC-US(A) NIL   HPV 16 DNA NEG Negative Negative -   HPV 18 DNA NEG Negative Negative -   OTHER HR HPV NEG Negative Positive(A) -     Reviewed and updated as needed this visit by clinical staff  Allergies  Meds         Reviewed and updated as needed this visit by Provider        Past Medical History:   Diagnosis Date     ASCUS with positive high risk HPV 2015     Gynecological examination      S/P LEEP of cervix     has been >20 yrs since LEEP, no abnls noted, paps moved to Q3 yrs     Supervision of normal first pregnancy      Vaginitis and vulvovaginitis, unspecified       Past Surgical History:   Procedure Laterality Date     ESOPHAGOSCOPY, GASTROSCOPY, DUODENOSCOPY (EGD), COMBINED N/A 2017    Procedure: COMBINED ESOPHAGOSCOPY, GASTROSCOPY, DUODENOSCOPY (EGD);  Surgeon: Damian Ames MD;  Location: [facility]     LEEP TX, CERVICAL      LEEP TX Cervical     MAMMOPLASTY REDUCTION BILATERAL Bilateral 2017    Procedure: MAMMOPLASTY REDUCTION BILATERAL;  Bilateral Breast Reduction Mammoplasty;  Surgeon: Franny Reynolds MD;  Location[facility]     SURGICAL HISTORY OF -       Ear surgery     SURGICAL HISTORY OF -       Foot surgery     OB History    Para Term  AB Living   4 2 2 0 0 2   SAB TAB Ectopic Multiple Live Births   0 0 0 0 2      # Outcome Date  "GA Lbr Herve/2nd Weight Sex Delivery Anes PTL Lv   4  07 38w6d 02:48 3.204 kg (7 lb 1 oz) F    FREDRICK      Name: Cande      Apgar1: 8  Apgar5: 9   3  05 39w0d 03:07 3.147 kg (6 lb 15 oz) F IVD EPIDURAL  FREDRICK      Name: Edith Pearson       Apgar1: 9  Apgar5: 9   2 Term            1 Term                Review of Systems   Constitutional: Negative for chills and fever.   HENT: Negative for congestion and ear pain.    Eyes: Negative for pain.   Respiratory: Negative for cough.    Cardiovascular: Negative for chest pain.   Gastrointestinal: Negative for abdominal pain, constipation, diarrhea and hematochezia.   Genitourinary: Negative for frequency and hematuria.   Neurological: Negative for dizziness.   Psychiatric/Behavioral: The patient is not nervous/anxious.         OBJECTIVE:   /88 (BP Location: Right arm, Patient Position: Chair, Cuff Size: Adult Large)   Pulse 83   Temp 98.4  F (36.9  C) (Tympanic)   Resp 16   Ht 1.651 m (5' 5\")   Wt 106.6 kg (235 lb)   LMP 2020   SpO2 94%   BMI 39.11 kg/m    Physical Exam  GENERAL: healthy, alert and no distress  EYES: Eyes grossly normal to inspection, PERRL and conjunctivae and sclerae normal  HENT: ear canals and TM's normal, nose and mouth without ulcers or lesions  NECK: no adenopathy, no asymmetry, masses, or scars and thyroid normal to palpation  RESP: lungs clear to auscultation - no rales, rhonchi or wheezes  BREAST: normal without masses, tenderness or nipple discharge and no palpable axillary masses or adenopathy, well healed surgical scars present  CV: regular rate and rhythm, normal S1 S2, no S3 or S4, no murmur, click or rub, no peripheral edema and peripheral pulses strong  ABDOMEN: soft, nontender, no hepatosplenomegaly, no masses and bowel sounds normal   (female): normal female external genitalia, normal urethral meatus, vaginal mucosa pink, moist, well rugated, and normal cervix, uterus enlarged with adnexa " fullness, pap taken  MS: no gross musculoskeletal defects noted, no edema  SKIN: no suspicious lesions, there is a dry, pink scaly one cm patch on left elbow  NEURO: Normal strength and tone, mentation intact and speech normal  PSYCH: mentation appears normal, affect normal/bright    Diagnostic Test Results:  Labs reviewed in Epic    ASSESSMENT/PLAN:   Noe was seen today for physical.    Diagnoses and all orders for this visit:    Routine general medical examination at a health care facility    Hypertension goal BP (blood pressure) < 140/90  -     CBC with platelets  -     Comprehensive metabolic panel  -     lisinopril (PRINIVIL/ZESTRIL) 20 MG tablet; TAKE 1 TABLET BY MOUTH ONCE DAILY  -     OFFICE/OUTPT VISIT,EST,LEVL IV    CARDIOVASCULAR SCREENING; LDL GOAL LESS THAN 160  -     Lipid panel reflex to direct LDL Fasting    Special screening for malignant neoplasms, colon  -     GASTROENTEROLOGY ADULT REF PROCEDURE ONLY    Ascending aorta dilatation (H)  -     metoprolol succinate ER (TOPROL-XL) 25 MG 24 hr tablet; Take 1 tablet (25 mg) by mouth daily    Screening for cervical cancer  -     Pap imaged thin layer screen with HPV - recommended age 30 - 65 years (select HPV order below)  -     HPV High Risk Types DNA Cervical    Adnexal fullness  -     US Pelvic Complete w Transvaginal; Future  -     OFFICE/OUTPT VISIT,EST,LEVL IV    Submucous leiomyoma of uterus  -     US Pelvic Complete w Transvaginal; Future  -     OFFICE/OUTPT VISIT,EST,LEVL IV    Skin lesion  -     triamcinolone (KENALOG) 0.1 % external ointment; Apply topically 2 times daily  -     OFFICE/OUTPT VISIT,EST,LEVL IV    Encounter for gynecological examination with Papanicolaou smear of cervix    Morbid obesity (H)    use steroid ointment on lesion. Biopsy if does not resolve.      COUNSELING:  Reviewed preventive health counseling, as reflected in patient instructions       Regular exercise       Healthy diet/nutrition       Colon cancer  "screening    Estimated body mass index is 39.11 kg/m  as calculated from the following:    Height as of this encounter: 1.651 m (5' 5\").    Weight as of this encounter: 106.6 kg (235 lb).    Weight management plan: Discussed healthy diet and exercise guidelines     reports that she has never smoked. She has never used smokeless tobacco.      Counseling Resources:  ATP IV Guidelines  Pooled Cohorts Equation Calculator  Breast Cancer Risk Calculator  FRAX Risk Assessment  ICSI Preventive Guidelines  Dietary Guidelines for Americans, 2010  USDA's MyPlate  ASA Prophylaxis  Lung CA Screening    Abril Bello MD  [facility]  "

## 2020-02-03 NOTE — PATIENT INSTRUCTIONS
Get the pelvic ultrasound     Labs today      Preventive Health Recommendations  Female Ages 50 - 64    Yearly exam: See your health care provider every year in order to  o Review health changes.   o Discuss preventive care.    o Review your medicines if your doctor has prescribed any.      Get a Pap test every three years (unless you have an abnormal result and your provider advises testing more often).    If you get Pap tests with HPV test, you only need to test every 5 years, unless you have an abnormal result.     You do not need a Pap test if your uterus was removed (hysterectomy) and you have not had cancer.    You should be tested each year for STDs (sexually transmitted diseases) if you're at risk.     Have a mammogram every 1 to 2 years.    Have a colonoscopy at age 50, or have a yearly FIT test (stool test). These exams screen for colon cancer.      Have a cholesterol test every 5 years, or more often if advised.    Have a diabetes test (fasting glucose) every three years. If you are at risk for diabetes, you should have this test more often.     If you are at risk for osteoporosis (brittle bone disease), think about having a bone density scan (DEXA).    Shots: Get a flu shot each year. Get a tetanus shot every 10 years.    Nutrition:     Eat at least 5 servings of fruits and vegetables each day.    Eat whole-grain bread, whole-wheat pasta and brown rice instead of white grains and rice.    Get adequate Calcium and Vitamin D.     Lifestyle    Exercise at least 150 minutes a week (30 minutes a day, 5 days a week). This will help you control your weight and prevent disease.    Limit alcohol to one drink per day.    No smoking.     Wear sunscreen to prevent skin cancer.     See your dentist every six months for an exam and cleaning.    See your eye doctor every 1 to 2 years.

## 2020-02-03 NOTE — LETTER
February 12, 2020    Noe Fitzgerald  [address] [place] [id number]    Dear ,  This letter is regarding your recent Pap smear (cervical cancer screening) and Human Papillomavirus (HPV) test.  We are happy to inform you that your Pap smear result is normal. Cervical cancer is closely linked with certain types of HPV. Your results showed no evidence of high-risk HPV.  We recommend you have your next PAP smear and HPV test in 5 years.  You will still need to return to the clinic every year for an annual exam and other preventive tests.  If you have additional questions regarding this result, please call our registered nurse, Serina at [phone].  Sincerely,    Abril Bello MD/oracio

## 2020-02-05 LAB
COPATH REPORT: NORMAL
PAP: NORMAL

## 2020-02-06 LAB
FINAL DIAGNOSIS: NORMAL
HPV HR 12 DNA CVX QL NAA+PROBE: NEGATIVE
HPV16 DNA SPEC QL NAA+PROBE: NEGATIVE
HPV18 DNA SPEC QL NAA+PROBE: NEGATIVE
SPECIMEN DESCRIPTION: NORMAL
SPECIMEN SOURCE CVX/VAG CYTO: NORMAL

## 2020-08-04 ENCOUNTER — E-VISIT (OUTPATIENT)
Dept: FAMILY MEDICINE | Facility: CLINIC | Age: 50
End: 2020-08-04
Payer: COMMERCIAL

## 2020-08-04 DIAGNOSIS — R19.7 DIARRHEA, UNSPECIFIED TYPE: Primary | ICD-10-CM

## 2020-08-04 DIAGNOSIS — R51.9 HEADACHE, UNSPECIFIED HEADACHE TYPE: ICD-10-CM

## 2020-08-04 PROCEDURE — 99207 ZZC NO BILLABLE SERVICE THIS VISIT: CPT | Performed by: FAMILY MEDICINE

## 2020-11-22 ENCOUNTER — HEALTH MAINTENANCE LETTER (OUTPATIENT)
Age: 50
End: 2020-11-22

## 2021-02-15 ENCOUNTER — MYC MEDICAL ADVICE (OUTPATIENT)
Dept: FAMILY MEDICINE | Facility: CLINIC | Age: 51
End: 2021-02-15

## 2021-02-15 DIAGNOSIS — Z13.6 CARDIOVASCULAR SCREENING; LDL GOAL LESS THAN 160: ICD-10-CM

## 2021-02-15 DIAGNOSIS — I10 HYPERTENSION GOAL BP (BLOOD PRESSURE) < 140/90: ICD-10-CM

## 2021-02-15 DIAGNOSIS — I10 HYPERTENSION GOAL BP (BLOOD PRESSURE) < 140/90: Primary | ICD-10-CM

## 2021-02-15 RX ORDER — LISINOPRIL 20 MG/1
TABLET ORAL
Qty: 30 TABLET | Refills: 0 | Status: SHIPPED | OUTPATIENT
Start: 2021-02-15 | End: 2021-03-18

## 2021-02-15 NOTE — TELEPHONE ENCOUNTER
"Requested Prescriptions   Pending Prescriptions Disp Refills     lisinopril (ZESTRIL) 20 MG tablet [Pharmacy Med Name: Lisinopril 20 MG Oral Tablet] 90 tablet 0     Sig: Take 1 tablet by mouth once daily       ACE Inhibitors (Including Combos) Protocol Failed - 2/15/2021 10:50 AM        Failed - Blood pressure under 140/90 in past 12 months     BP Readings from Last 3 Encounters:   02/03/20 124/88   06/03/19 (!) 136/91   03/22/19 126/88                 Failed - Normal serum creatinine on file in past 12 months     Recent Labs   Lab Test 02/03/20  1420   CR 0.81       Ok to refill medication if creatinine is low          Failed - Normal serum potassium on file in past 12 months     Recent Labs   Lab Test 02/03/20  1420   POTASSIUM 4.0             Passed - Recent (12 mo) or future (30 days) visit within the authorizing provider's specialty     Patient has had an office visit with the authorizing provider or a provider within the authorizing providers department within the previous 12 mos or has a future within next 30 days. See \"Patient Info\" tab in inbasket, or \"Choose Columns\" in Meds & Orders section of the refill encounter.              Passed - Medication is active on med list        Passed - Patient is age 18 or older        Passed - No active pregnancy on record        Passed - No positive pregnancy test within past 12 months             "

## 2021-03-08 DIAGNOSIS — I77.810 ASCENDING AORTA DILATATION (H): ICD-10-CM

## 2021-03-09 RX ORDER — METOPROLOL SUCCINATE 25 MG/1
TABLET, EXTENDED RELEASE ORAL
Qty: 90 TABLET | Refills: 0 | Status: SHIPPED | OUTPATIENT
Start: 2021-03-09 | End: 2021-03-18

## 2021-03-16 DIAGNOSIS — I77.810 ASCENDING AORTA DILATATION (H): ICD-10-CM

## 2021-03-16 NOTE — TELEPHONE ENCOUNTER
Noe knows she needs appointment but doesn't know if she needs to see Dr. Harvey or just have labs?  She is OK coming in if she needs to be seen but otherwise if it's just labs she can get them done in [place].  She was hoping to get a 90 day refill of lisinopril.    lisinopril      Last Written Prescription Date:  2/15/21  Last Fill Quantity: 30,   # refills: 0  Last Office Visit: 2/3/20  Future Office visit:       Routing refill request to provider for review/approval because:  Drug not on the [facility], [facility] or [facility] refill protocol or controlled substance

## 2021-03-18 ENCOUNTER — MYC REFILL (OUTPATIENT)
Dept: FAMILY MEDICINE | Facility: CLINIC | Age: 51
End: 2021-03-18

## 2021-03-18 DIAGNOSIS — I10 HYPERTENSION GOAL BP (BLOOD PRESSURE) < 140/90: ICD-10-CM

## 2021-03-18 RX ORDER — METOPROLOL SUCCINATE 25 MG/1
25 TABLET, EXTENDED RELEASE ORAL DAILY
Qty: 30 TABLET | Refills: 0 | Status: SHIPPED | OUTPATIENT
Start: 2021-03-18 | End: 2021-05-27

## 2021-03-18 RX ORDER — LISINOPRIL 20 MG/1
20 TABLET ORAL DAILY
Qty: 90 TABLET | Refills: 1 | Status: SHIPPED | OUTPATIENT
Start: 2021-03-18 | End: 2021-05-27

## 2021-03-18 NOTE — TELEPHONE ENCOUNTER
Call place to patient as annual health maintenance  and blood pressure check are needed for further refills. Bhargavi refills given last month.  Appointment scheduled for 4/8. Refill request forwarded to Dr Flores.   Mandy CRISTINA RN

## 2021-04-04 ENCOUNTER — HEALTH MAINTENANCE LETTER (OUTPATIENT)
Age: 51
End: 2021-04-04

## 2021-04-08 ENCOUNTER — OFFICE VISIT (OUTPATIENT)
Dept: FAMILY MEDICINE | Facility: CLINIC | Age: 51
End: 2021-04-08
Payer: COMMERCIAL

## 2021-04-08 VITALS
HEIGHT: 66 IN | OXYGEN SATURATION: 99 % | HEART RATE: 69 BPM | TEMPERATURE: 97.6 F | BODY MASS INDEX: 36.64 KG/M2 | SYSTOLIC BLOOD PRESSURE: 139 MMHG | DIASTOLIC BLOOD PRESSURE: 89 MMHG | WEIGHT: 228 LBS | RESPIRATION RATE: 16 BRPM

## 2021-04-08 DIAGNOSIS — E66.01 MORBID OBESITY (H): ICD-10-CM

## 2021-04-08 DIAGNOSIS — Z00.00 ROUTINE GENERAL MEDICAL EXAMINATION AT A HEALTH CARE FACILITY: Primary | ICD-10-CM

## 2021-04-08 DIAGNOSIS — I10 HYPERTENSION GOAL BP (BLOOD PRESSURE) < 140/90: ICD-10-CM

## 2021-04-08 DIAGNOSIS — Z13.6 CARDIOVASCULAR SCREENING; LDL GOAL LESS THAN 160: ICD-10-CM

## 2021-04-08 DIAGNOSIS — Z12.11 SPECIAL SCREENING FOR MALIGNANT NEOPLASMS, COLON: ICD-10-CM

## 2021-04-08 DIAGNOSIS — I77.810 ASCENDING AORTA DILATATION (H): ICD-10-CM

## 2021-04-08 DIAGNOSIS — R13.19 ESOPHAGEAL DYSPHAGIA: ICD-10-CM

## 2021-04-08 LAB
ALBUMIN SERPL-MCNC: 3.6 G/DL (ref 3.4–5)
ALP SERPL-CCNC: 60 U/L (ref 40–150)
ALT SERPL W P-5'-P-CCNC: 18 U/L (ref 0–50)
ANION GAP SERPL CALCULATED.3IONS-SCNC: 3 MMOL/L (ref 3–14)
AST SERPL W P-5'-P-CCNC: 15 U/L (ref 0–45)
BASOPHILS # BLD AUTO: 0 10E9/L (ref 0–0.2)
BASOPHILS NFR BLD AUTO: 0.4 %
BILIRUB SERPL-MCNC: 0.4 MG/DL (ref 0.2–1.3)
BUN SERPL-MCNC: 12 MG/DL (ref 7–30)
CALCIUM SERPL-MCNC: 8.5 MG/DL (ref 8.5–10.1)
CHLORIDE SERPL-SCNC: 106 MMOL/L (ref 94–109)
CHOLEST SERPL-MCNC: 230 MG/DL
CO2 SERPL-SCNC: 28 MMOL/L (ref 20–32)
CREAT SERPL-MCNC: 0.85 MG/DL (ref 0.52–1.04)
DIFFERENTIAL METHOD BLD: NORMAL
EOSINOPHIL # BLD AUTO: 0.2 10E9/L (ref 0–0.7)
EOSINOPHIL NFR BLD AUTO: 3.6 %
ERYTHROCYTE [DISTWIDTH] IN BLOOD BY AUTOMATED COUNT: 13.6 % (ref 10–15)
GFR SERPL CREATININE-BSD FRML MDRD: 79 ML/MIN/{1.73_M2}
GLUCOSE SERPL-MCNC: 85 MG/DL (ref 70–99)
HCT VFR BLD AUTO: 40 % (ref 35–47)
HDLC SERPL-MCNC: 56 MG/DL
HGB BLD-MCNC: 13.1 G/DL (ref 11.7–15.7)
LDLC SERPL CALC-MCNC: 137 MG/DL
LYMPHOCYTES # BLD AUTO: 1.7 10E9/L (ref 0.8–5.3)
LYMPHOCYTES NFR BLD AUTO: 36.4 %
MCH RBC QN AUTO: 28.7 PG (ref 26.5–33)
MCHC RBC AUTO-ENTMCNC: 32.8 G/DL (ref 31.5–36.5)
MCV RBC AUTO: 88 FL (ref 78–100)
MONOCYTES # BLD AUTO: 0.4 10E9/L (ref 0–1.3)
MONOCYTES NFR BLD AUTO: 8.8 %
NEUTROPHILS # BLD AUTO: 2.4 10E9/L (ref 1.6–8.3)
NEUTROPHILS NFR BLD AUTO: 50.8 %
NONHDLC SERPL-MCNC: 174 MG/DL
PLATELET # BLD AUTO: 224 10E9/L (ref 150–450)
POTASSIUM SERPL-SCNC: 4.3 MMOL/L (ref 3.4–5.3)
PROT SERPL-MCNC: 7.3 G/DL (ref 6.8–8.8)
RBC # BLD AUTO: 4.56 10E12/L (ref 3.8–5.2)
SODIUM SERPL-SCNC: 137 MMOL/L (ref 133–144)
TRIGL SERPL-MCNC: 183 MG/DL
WBC # BLD AUTO: 4.8 10E9/L (ref 4–11)

## 2021-04-08 PROCEDURE — 80053 COMPREHEN METABOLIC PANEL: CPT | Performed by: FAMILY MEDICINE

## 2021-04-08 PROCEDURE — 99396 PREV VISIT EST AGE 40-64: CPT | Performed by: FAMILY MEDICINE

## 2021-04-08 PROCEDURE — 85025 COMPLETE CBC W/AUTO DIFF WBC: CPT | Performed by: FAMILY MEDICINE

## 2021-04-08 PROCEDURE — 80061 LIPID PANEL: CPT | Performed by: FAMILY MEDICINE

## 2021-04-08 PROCEDURE — 36415 COLL VENOUS BLD VENIPUNCTURE: CPT | Performed by: FAMILY MEDICINE

## 2021-04-08 ASSESSMENT — ENCOUNTER SYMPTOMS
DIZZINESS: 0
NAUSEA: 0
CHILLS: 0
HEARTBURN: 0
HEMATURIA: 0
HEMATOCHEZIA: 0
SHORTNESS OF BREATH: 0
ARTHRALGIAS: 0
SORE THROAT: 0
HEADACHES: 0
PALPITATIONS: 0
CONSTIPATION: 0
PARESTHESIAS: 0
ABDOMINAL PAIN: 0
NERVOUS/ANXIOUS: 0
FEVER: 0
DIARRHEA: 0
WEAKNESS: 0
MYALGIAS: 0
EYE PAIN: 0
DYSURIA: 0
JOINT SWELLING: 0
FREQUENCY: 0
COUGH: 0

## 2021-04-08 ASSESSMENT — MIFFLIN-ST. JEOR: SCORE: 1653.01

## 2021-04-08 NOTE — PROGRESS NOTES
SUBJECTIVE:   CC: Noe Fitzgerald is an 51 year old woman who presents for preventive health visit.       Patient has been advised of split billing requirements and indicates understanding: No  Healthy Habits:     Getting at least 3 servings of Calcium per day:  Yes    Bi-annual eye exam:  NO    Dental care twice a year:  Yes    Sleep apnea or symptoms of sleep apnea:  None    Diet:  Regular (no restrictions)    Frequency of exercise:  4-5 days/week    Duration of exercise:  15-30 minutes    Medication side effects:  None    PHQ-2 Total Score: 0    Additional concerns today:  No      Hypertension Follow-up      Do you check your blood pressure regularly outside of the clinic? No     Are you following a low salt diet? No    On lisinopril, metoprolol  BP Readings from Last 6 Encounters:   04/08/21 139/89   02/03/20 124/88   06/03/19 (!) 136/91   03/22/19 126/88   02/06/19 134/86   12/31/18 (!) 152/98      Wt Readings from Last 5 Encounters:   04/08/21 103.4 kg (228 lb)   02/03/20 106.6 kg (235 lb)   06/03/19 108.9 kg (240 lb)   02/06/19 109.9 kg (242 lb 3.2 oz)   10/25/18 107.5 kg (237 lb)      Today's PHQ-2 Score:   PHQ-2 ( 1999 Pf[employer]) 4/8/2021   Q1: Little interest or pleasure in doing things 0   Q2: Feeling down, depressed or hopeless 0   PHQ-2 Score 0   Q1: Little interest or pleasure in doing things Not at all   Q2: Feeling down, depressed or hopeless Not at all   PHQ-2 Score 0       Can have food get stuck, or not going down. Then will spasm.   Several years, was rare. But progressively worsening. No trouble with fluids, just solids. Feels it get stuck in same spot substernal down by stomach.     Abuse: Current or Past (Physical, Sexual or Emotional) - No  Do you feel safe in your environment? Yes    Have you ever done Advance Care Planning? (For example, a Health Directive, POLST, or a discussion with a medical provider or your loved ones about your wishes): Yes, advance care planning is on  file.    Social History     Tobacco Use     Smoking status: Never Smoker     Smokeless tobacco: Never Used     Tobacco comment: Pt experiences second hand smoke at work    Substance Use Topics     Alcohol use: No     Comment: rarely         Alcohol Use 4/8/2021   Prescreen: >3 drinks/day or >7 drinks/week? No   Prescreen: >3 drinks/day or >7 drinks/week? -       Reviewed orders with patient.  Reviewed health maintenance and updated orders accordingly - Yes  BP Readings from Last 3 Encounters:   04/08/21 139/89   02/03/20 124/88   06/03/19 (!) 136/91    Wt Readings from Last 3 Encounters:   04/08/21 103.4 kg (228 lb)   02/03/20 106.6 kg (235 lb)   06/03/19 108.9 kg (240 lb)                 Breast Cancer Screening:    Breast CA Risk Assessment (FHS-7) 4/8/2021   Do you have a family history of breast, colon, or ovarian cancer? No / Unknown       Mammogram Screening: Recommended annual mammography  Pertinent mammograms are reviewed under the imaging tab.    History of abnormal Pap smear: NO - age 30-65 PAP every 5 years with negative HPV co-testing recommended  PAP / HPV Latest Ref Rng & Units 2/3/2020 2/17/2017 11/23/2015   PAP - OTHER-NIL, See Result NIL ASC-US(A)   HPV 16 DNA NEG:Negative Negative Negative Negative   HPV 18 DNA NEG:Negative Negative Negative Negative   OTHER HR HPV NEG:Negative Negative Negative Positive(A)     Reviewed and updated as needed this visit by clinical staff  Tobacco  Allergies  Meds              Reviewed and updated as needed this visit by Provider                Past Medical History:   Diagnosis Date     ASCUS with positive high risk HPV 11/2015     Gynecological examination      S/P LEEP of cervix 1991    has been >20 yrs since LEEP, no abnls noted, paps moved to Q3 yrs     Supervision of normal first pregnancy      Vaginitis and vulvovaginitis, unspecified       Past Surgical History:   Procedure Laterality Date     ESOPHAGOSCOPY, GASTROSCOPY, DUODENOSCOPY (EGD), COMBINED N/A  "2017    Procedure: COMBINED ESOPHAGOSCOPY, GASTROSCOPY, DUODENOSCOPY (EGD);  Surgeon: Damian Ames MD;  Location: [facility]     LEEP TX, CERVICAL  1991    LEEP TX Cervical     MAMMOPLASTY REDUCTION BILATERAL Bilateral 2017    Procedure: MAMMOPLASTY REDUCTION BILATERAL;  Bilateral Breast Reduction Mammoplasty;  Surgeon: Franny Reynolds MD;  Location: [facility]     SURGICAL HISTORY OF -       Ear surgery     SURGICAL HISTORY OF -       Foot surgery     OB History    Para Term  AB Living   4 2 2 0 0 2   SAB TAB Ectopic Multiple Live Births   0 0 0 0 2      # Outcome Date GA Lbr Herve/2nd Weight Sex Delivery Anes PTL Lv   4  07 38w6d 02:48 3.204 kg (7 lb 1 oz) F    FREDRICK      Name: Cande      Apgar1: 8  Apgar5: 9   3  05 39w0d 03:07 3.147 kg (6 lb 15 oz) F IVD EPIDURAL  FREDRICK      Name: Edith Pearson       Apgar1: 9  Apgar5: 9   2 Term            1 Term                Review of Systems   Constitutional: Negative for chills and fever.   HENT: Negative for congestion, ear pain, hearing loss and sore throat.    Eyes: Negative for pain and visual disturbance.   Respiratory: Negative for cough and shortness of breath.    Cardiovascular: Negative for chest pain, palpitations and peripheral edema.   Gastrointestinal: Negative for abdominal pain, constipation, diarrhea, heartburn, hematochezia and nausea.   Genitourinary: Negative for dysuria, frequency, genital sores, hematuria, pelvic pain, urgency and vaginal bleeding.   Musculoskeletal: Negative for arthralgias, joint swelling and myalgias.   Skin: Negative for rash.   Neurological: Negative for dizziness, weakness, headaches and paresthesias.   Psychiatric/Behavioral: Negative for mood changes. The patient is not nervous/anxious.         OBJECTIVE:   /89 (BP Location: Right arm)   Pulse 69   Temp 97.6  F (36.4  C) (Tympanic)   Resp 16   Ht 1.664 m (5' 5.5\")   Wt 103.4 kg (228 lb)   LMP 2021   SpO2 99%   " "BMI 37.36 kg/m    Physical Exam  GENERAL: healthy, alert and no distress  EYES: Eyes grossly normal to inspection, PERRL and conjunctivae and sclerae normal  HENT: ear canals and TM's normal, nose and mouth without ulcers or lesions  NECK: no adenopathy, no asymmetry, masses, or scars and thyroid normal to palpation  RESP: lungs clear to auscultation - no rales, rhonchi or wheezes  BREAST: normal without masses, tenderness or nipple discharge and no palpable axillary masses or adenopathy  CV: regular rate and rhythm, normal S1 S2, no S3 or S4, no murmur, click or rub, no peripheral edema and peripheral pulses strong  ABDOMEN: soft, nontender, no hepatosplenomegaly, no masses and bowel sounds normal  MS: no gross musculoskeletal defects noted, no edema  SKIN: no suspicious lesions or rashes  NEURO: Normal strength and tone, mentation intact and speech normal  PSYCH: mentation appears normal, affect normal/bright      ASSESSMENT/PLAN:   Noe was seen today for physical.    Diagnoses and all orders for this visit:    Routine general medical examination at a health care facility    Special screening for malignant neoplasms, colon  -     GASTROENTEROLOGY ADULT REF PROCEDURE ONLY; Future    CARDIOVASCULAR SCREENING; LDL GOAL LESS THAN 160  -     Lipid panel reflex to direct LDL Fasting    Ascending aorta dilatation (H)  -     Echocardiogram Complete; Future    Hypertension goal BP (blood pressure) < 140/90  -     Comprehensive metabolic panel  -     CBC with platelets and differential    bmi 37    Esophageal dysphagia  -     GASTROENTEROLOGY ADULT REF PROCEDURE ONLY; Future        Patient has been advised of split billing requirements and indicates understanding: Yes  COUNSELING:  Reviewed preventive health counseling, as reflected in patient instructions    Estimated body mass index is 37.36 kg/m  as calculated from the following:    Height as of this encounter: 1.664 m (5' 5.5\").    Weight as of this encounter: " 103.4 kg (228 lb).    Weight management plan: Discussed healthy diet and exercise guidelines    She reports that she has never smoked. She has never used smokeless tobacco.      Counseling Resources:  ATP IV Guidelines  Pooled Cohorts Equation Calculator  Breast Cancer Risk Calculator  BRCA-Related Cancer Risk Assessment: FHS-7 Tool  FRAX Risk Assessment  ICSI Preventive Guidelines  Dietary Guidelines for Americans, 2010  USDA's MyPlate  ASA Prophylaxis  Lung CA Screening    Abril Flores MD  [facility]

## 2021-04-08 NOTE — PATIENT INSTRUCTIONS
Get the echocardiogram done    Get the scopes done      Preventive Health Recommendations  Female Ages 50 - 64    Yearly exam: See your health care provider every year in order to  o Review health changes.   o Discuss preventive care.    o Review your medicines if your doctor has prescribed any.      Get a Pap test every three years (unless you have an abnormal result and your provider advises testing more often).    If you get Pap tests with HPV test, you only need to test every 5 years, unless you have an abnormal result.     You do not need a Pap test if your uterus was removed (hysterectomy) and you have not had cancer.    You should be tested each year for STDs (sexually transmitted diseases) if you're at risk.     Have a mammogram every 1 to 2 years.    Have a colonoscopy at age 50, or have a yearly FIT test (stool test). These exams screen for colon cancer.      Have a cholesterol test every 5 years, or more often if advised.    Have a diabetes test (fasting glucose) every three years. If you are at risk for diabetes, you should have this test more often.     If you are at risk for osteoporosis (brittle bone disease), think about having a bone density scan (DEXA).    Shots: Get a flu shot each year. Get a tetanus shot every 10 years.    Nutrition:     Eat at least 5 servings of fruits and vegetables each day.    Eat whole-grain bread, whole-wheat pasta and brown rice instead of white grains and rice.    Get adequate Calcium and Vitamin D.     Lifestyle    Exercise at least 150 minutes a week (30 minutes a day, 5 days a week). This will help you control your weight and prevent disease.    Limit alcohol to one drink per day.    No smoking.     Wear sunscreen to prevent skin cancer.     See your dentist every six months for an exam and cleaning.    See your eye doctor every 1 to 2 years.

## 2021-04-08 NOTE — NURSING NOTE
"Chief Complaint   Patient presents with     Physical       Initial /89 (BP Location: Right arm)   Pulse 69   Temp 97.6  F (36.4  C) (Tympanic)   Resp 16   Ht 1.664 m (5' 5.5\")   Wt 103.4 kg (228 lb)   LMP 04/04/2021   SpO2 99%   BMI 37.36 kg/m   Estimated body mass index is 37.36 kg/m  as calculated from the following:    Height as of this encounter: 1.664 m (5' 5.5\").    Weight as of this encounter: 103.4 kg (228 lb).    Patient presents to the clinic using No DME    Health Maintenance that is potentially due pending provider review:  Mammogram and Colonoscopy/FIT    Gave pt phone number/pended order to schedule mammo and/or colonoscopy(or FIT)    Is there anyone who you would like to be able to receive your results? No  If yes have patient fill out NATACHA    "

## 2021-04-12 ENCOUNTER — MYC MEDICAL ADVICE (OUTPATIENT)
Dept: FAMILY MEDICINE | Facility: CLINIC | Age: 51
End: 2021-04-12

## 2021-04-12 DIAGNOSIS — N92.4 EXCESSIVE BLEEDING IN PREMENOPAUSAL PERIOD: Primary | ICD-10-CM

## 2021-04-19 ENCOUNTER — ANCILLARY PROCEDURE (OUTPATIENT)
Dept: ULTRASOUND IMAGING | Facility: CLINIC | Age: 51
End: 2021-04-19
Attending: FAMILY MEDICINE
Payer: COMMERCIAL

## 2021-04-19 DIAGNOSIS — N92.4 EXCESSIVE BLEEDING IN PREMENOPAUSAL PERIOD: ICD-10-CM

## 2021-04-19 RX ORDER — TRANEXAMIC ACID 650 MG/1
1300 TABLET ORAL 3 TIMES DAILY
Qty: 30 TABLET | Refills: 1 | Status: SHIPPED | OUTPATIENT
Start: 2021-04-19 | End: 2021-04-24

## 2021-04-29 ENCOUNTER — TELEPHONE (OUTPATIENT)
Dept: FAMILY MEDICINE | Facility: CLINIC | Age: 51
End: 2021-04-29

## 2021-04-29 NOTE — TELEPHONE ENCOUNTER
Procedure  has reached out to schedule diagnostic upper and lower endoscopy    Patient has failed to return calls to schedule      No further action necessary for procedure  at this time

## 2021-05-03 ENCOUNTER — TELEPHONE (OUTPATIENT)
Dept: OBGYN | Facility: CLINIC | Age: 51
End: 2021-05-03

## 2021-05-03 ENCOUNTER — VIRTUAL VISIT (OUTPATIENT)
Dept: OBGYN | Facility: CLINIC | Age: 51
End: 2021-05-03
Payer: COMMERCIAL

## 2021-05-03 VITALS — BODY MASS INDEX: 33.59 KG/M2 | WEIGHT: 205 LBS

## 2021-05-03 DIAGNOSIS — N93.9 ABNORMAL UTERINE BLEEDING: Primary | ICD-10-CM

## 2021-05-03 PROCEDURE — 99203 OFFICE O/P NEW LOW 30 MIN: CPT | Mod: TEL | Performed by: OBSTETRICS & GYNECOLOGY

## 2021-05-03 RX ORDER — NORGESTIMATE AND ETHINYL ESTRADIOL 0.25-0.035
1 KIT ORAL DAILY
Qty: 86 TABLET | Refills: 3 | Status: SHIPPED | OUTPATIENT
Start: 2021-05-03 | End: 2021-05-27

## 2021-05-03 RX ORDER — TRANEXAMIC ACID 650 MG/1
1300 TABLET ORAL 2 TIMES DAILY
COMMUNITY
End: 2021-05-27

## 2021-05-03 NOTE — TELEPHONE ENCOUNTER
Patient changed to virtual visit today at same time with Dr. Cadena.    Zoë Montana   [facility]  RN

## 2021-05-03 NOTE — TELEPHONE ENCOUNTER
Reason for Call:  Other appointment    Detailed comments: Pt states she can not come in today, wants to know if she can go over US results and discuss options by phone? Please call    Phone Number Patient can be reached at: Home number on file [phone] (home)    Best Time: today    Can we leave a detailed message on this number? YES    Call taken on 5/3/2021 at 8:20 AM by Angelina Khalil

## 2021-05-03 NOTE — PROGRESS NOTES
Noe is a 51 year old who is being evaluated via a billable telephone visit.      What phone number would you like to be contacted at? [phone]  How would you like to obtain your AVS? Belén    [facility]  OB/GYN Clinic   Gynecology Consult Note    CC:    Chief Complaint   Patient presents with     Consult     discuss ultrasound results next step?        HPI: Ms. Fitzgerald is a 51 year old  being seen for GYN consultation for AUB.     Regular monthly period. Has started getting lighter periods, and then has been bleeding for over a month. Passing clots the size of a baseball. Bleeding lighter (more like a normal period) on lysteda, taking it twice a day. Out of this medication now. Has been bleeding for an entire month.     GYN Hx: Reports menarche at age 12-13, regular menses every 28-30 days, lasting 3-5 days. Denies any hx of STI or PID. Currently sexually active with one male partner, rarely, together for 30 yrs. Has some chin hair. No acne.   Random hot flashes.  No vaginal dryness.     She denies any nipple drainage or visual field defects.   Pt denies any personal or family history of VTE. She is a non-smoker.     ROS: A 10 pt ROS was completed and found to be otherwise negative unless mentioned in the HPI.     PMH:   Past Medical History:   Diagnosis Date     ASCUS with positive high risk HPV 2015     Gynecological examination      S/P LEEP of cervix     has been >20 yrs since LEEP, no abnls noted, paps moved to Q3 yrs     Supervision of normal first pregnancy      Vaginitis and vulvovaginitis, unspecified        PSHx:   Past Surgical History:   Procedure Laterality Date     ESOPHAGOSCOPY, GASTROSCOPY, DUODENOSCOPY (EGD), COMBINED N/A 2017    Procedure: COMBINED ESOPHAGOSCOPY, GASTROSCOPY, DUODENOSCOPY (EGD);  Surgeon: Damian Ames MD;  Location: [facility]     LEEP TX, CERVICAL      LEEP TX Cervical     MAMMOPLASTY REDUCTION BILATERAL Bilateral 2017     Procedure: MAMMOPLASTY REDUCTION BILATERAL;  Bilateral Breast Reduction Mammoplasty;  Surgeon: Franny Reynolds MD;  Location: [facility]     SURGICAL HISTORY OF -       Ear surgery     SURGICAL HISTORY OF -       Foot surgery       OBHx:   OB History    Para Term  AB Living   2 0 0 0 0 2   SAB TAB Ectopic Multiple Live Births   0 0 0 0 2      # Outcome Date GA Lbr Herve/2nd Weight Sex Delivery Anes PTL Lv   2  07 38w6d 02:48 3.204 kg (7 lb 1 oz) F    FREDRICK      Name: Cande      Apgar1: 8  Apgar5: 9   1  05 39w0d 03:07 3.147 kg (6 lb 15 oz) F IVD EPIDURAL  FREDRICK      Name: Edith Pearson       Apgar1: 9  Apgar5: 9       Medications:   lisinopril (ZESTRIL) 20 MG tablet, Take 1 tablet (20 mg) by mouth daily  metoprolol succinate ER (TOPROL-XL) 25 MG 24 hr tablet, Take 1 tablet (25 mg) by mouth daily  tranexamic acid (LYSTEDA) 650 MG tablet, Take 1,300 mg by mouth 2 times daily  triamcinolone (KENALOG) 0.1 % external ointment, Apply topically 2 times daily    No current facility-administered medications on file prior to visit.       Allergies:      Allergies   Allergen Reactions     Bactrim [Sulfamethoxazole W/Trimethoprim] Rash       Social History:   Social History     Socioeconomic History     Marital status:      Spouse name: Not on file     Number of children: Not on file     Years of education: Not on file     Highest education level: Not on file   Occupational History     Not on file   Social Needs     Financial resource strain: Not on file     Food insecurity     Worry: Not on file     Inability: Not on file     Transportation needs     Medical: Not on file     Non-medical: Not on file   Tobacco Use     Smoking status: Never Smoker     Smokeless tobacco: Never Used     Tobacco comment: Pt experiences second hand smoke at work    Substance and Sexual Activity     Alcohol use: No     Comment: rarely     Drug use: No     Sexual activity: Yes     Partners: Male    Lifestyle     Physical activity     Days per week: Not on file     Minutes per session: Not on file     Stress: Not on file   Relationships     Social connections     Talks on phone: Not on file     Gets together: Not on file     Attends Christian service: Not on file     Active member of club or organization: Not on file     Attends meetings of clubs or organizations: Not on file     Relationship status: Not on file     Intimate partner violence     Fear of current or ex partner: Not on file     Emotionally abused: Not on file     Physically abused: Not on file     Forced sexual activity: Not on file   Other Topics Concern     Parent/sibling w/ CABG, MI or angioplasty before 65F 55M? No   Social History Narrative     Not on file     Social History     Socioeconomic History     Marital status:      Spouse name: None     Number of children: None     Years of education: None     Highest education level: None   Occupational History     None   Social Needs     Financial resource strain: None     Food insecurity     Worry: None     Inability: None     Transportation needs     Medical: None     Non-medical: None   Tobacco Use     Smoking status: Never Smoker     Smokeless tobacco: Never Used     Tobacco comment: Pt experiences second hand smoke at work    Substance and Sexual Activity     Alcohol use: No     Comment: rarely     Drug use: No     Sexual activity: Yes     Partners: Male   Lifestyle     Physical activity     Days per week: None     Minutes per session: None     Stress: None   Relationships     Social connections     Talks on phone: None     Gets together: None     Attends Christian service: None     Active member of club or organization: None     Attends meetings of clubs or organizations: None     Relationship status: None     Intimate partner violence     Fear of current or ex partner: None     Emotionally abused: None     Physically abused: None     Forced sexual activity: None   Other Topics  Concern     Parent/sibling w/ CABG, MI or angioplasty before 65F 55M? No   Social History Narrative     None       Family History:   Family History   Problem Relation Age of Onset     Hypertension Father    No GYN cancers.      Physical Exam:   GEN: no distress  PSYCH: Alert, coherent speech, normal   rate and volume, able to articulate logical thoughts, able   to abstract reason, no tangential thoughts, no hallucinations   or delusions, normal affect  RESP: No cough, no audible wheezing, able to talk in full sentences  Remainder of exam unable to be completed due to telephone visits      Labs/Imaging:   PELVIC ULTRASOUND WITH ENDOVAGINAL TRANSDUCER  April 19, 2021 8:17 PM      HISTORY: Heavy periods. Excessive bleeding in premenopausal period.     TECHNIQUE: Endovaginal sonography was added to the transabdominal  exam.     COMPARISON: Pelvic ultrasound 11/17/2018.     FINDINGS:   Endometrium: Endometrium is 3.3 cm with evidence of internal flow and  a few scattered cystic areas.     Uterus: Measures 12.0 x 4.8 x 5.6 cm. There is 1.1 x 1.0 x 1.1 cm  predominantly subserosal right anterior mid uterine body fibroid.  Multiple cervical nabothian cysts.     Right ovary: Measures 2.1 x 2.1 x 2.7 cm. It demonstrates normal  follicular structure and color-flow.     Left ovary: Not well seen and/obscured by overlying bowel gas.     Additional findings: No significant free fluid in the pelvis.                                                                      IMPRESSION:    1. Significantly thickened endometrial stripe measuring 3.3 cm with  evidence of internal flow, findings worrisome for endometrial  hyperplasia/neoplasia. This can be further evaluated with tissue  sampling.  2. Multiple cervical nabothian cysts.  3. Small subserosal uterine fibroid.  4. The right ovary is unremarkable. The left ovary is not  visualized/obscured by overlying bowel gas. No suspicious adnexal  masses visualized.     ARUNA RODAS,  MD    A&P: Ms. Fitzgerald is a 50 yo P2 who presents today to discuss abnormal uterine bleeding.   I did recommend further evaluation for a structural cause of her bleeding with a pelvic US as well as an endocrinopathy evaluation with TSH, reflex T4 and prolactin. Given concern for PCOS, also recommended blood levels of testosterone, DHEAS, 17-OHP, FSH/LH/estradiol as well as a metabolic syndrome evaluation with a lipid panel and HgbA1c. Given possibility of perimenopausal bleeding, would also plan for a hormonal evaluation with an FSH, LH and estradiol.   Assuming a normal evaluation, I did review medication treatment options starting with implantable devices including the Mirena IUD and Nexplanon. I discussed the placement, duration, expected bleeding profiles, side effect profile, efficacy (as high as sterilization) and reversibility. Discussed protection from endometrial cancer and hyperplasia with progesterone-containing IUD. Discussed the remaining options including depo provera, OCPs, hormonal patch and hormonal ring including side effect profile and bleeding patterns. Pt denied any contraindications to estrogen (no personal or family history of VTE, no hx of migraine with aura, no cardiovascular or liver disease, pt is a non-smoker and her BP is normatensive).   Currently using tranexamic acid and the mechanism of action on the bleeding pathway. I reviewed an approximate 30-50% reduction in blood loss, but frustrated that she is still having daily bleeding.   I then reviewed the treatment options of an endometrial ablation including the steps of the procedure, the expected recovery as well as the risks of bleeding, infection and injury to surrounding organs. I discussed the safety mechanisms within the device and the goal of reduction in ~50% of blood loss.   Additionally, I reviewed the option of definitive treatment with a hysterectomy. I discussed my typical approach o minimally-invasive surgery, and that I  think she would be a candidate for a vaginal hysterectomy.  After this discussion, the patient would like to proceed with in office exam and endometrial biopsy.   Will start OCPs now and then review how she is doing in upcoming appointment on 5/14.      17 min over the phone.     Shweta Cadena MD  OB/GYN  5/3/2021

## 2021-05-07 ENCOUNTER — OFFICE VISIT (OUTPATIENT)
Dept: OBGYN | Facility: CLINIC | Age: 51
End: 2021-05-07
Payer: COMMERCIAL

## 2021-05-07 VITALS
WEIGHT: 226.8 LBS | BODY MASS INDEX: 36.45 KG/M2 | HEART RATE: 81 BPM | DIASTOLIC BLOOD PRESSURE: 95 MMHG | HEIGHT: 66 IN | SYSTOLIC BLOOD PRESSURE: 134 MMHG | TEMPERATURE: 98.8 F | RESPIRATION RATE: 16 BRPM

## 2021-05-07 DIAGNOSIS — N93.9 ABNORMAL UTERINE BLEEDING (AUB): ICD-10-CM

## 2021-05-07 DIAGNOSIS — R93.89 THICKENED ENDOMETRIUM: Primary | ICD-10-CM

## 2021-05-07 LAB
ERYTHROCYTE [DISTWIDTH] IN BLOOD BY AUTOMATED COUNT: 13 % (ref 10–15)
ESTRADIOL SERPL-MCNC: 68 PG/ML
FSH SERPL-ACNC: 6.1 IU/L
HBA1C MFR BLD: 5.1 % (ref 0–5.6)
HCT VFR BLD AUTO: 36 % (ref 35–47)
HGB BLD-MCNC: 11.4 G/DL (ref 11.7–15.7)
LH SERPL-ACNC: 3.3 IU/L
MCH RBC QN AUTO: 28.8 PG (ref 26.5–33)
MCHC RBC AUTO-ENTMCNC: 31.7 G/DL (ref 31.5–36.5)
MCV RBC AUTO: 91 FL (ref 78–100)
PLATELET # BLD AUTO: 250 10E9/L (ref 150–450)
RBC # BLD AUTO: 3.96 10E12/L (ref 3.8–5.2)
TSH SERPL DL<=0.005 MIU/L-ACNC: 2.44 MU/L (ref 0.4–4)
WBC # BLD AUTO: 5.8 10E9/L (ref 4–11)

## 2021-05-07 PROCEDURE — 84443 ASSAY THYROID STIM HORMONE: CPT | Performed by: OBSTETRICS & GYNECOLOGY

## 2021-05-07 PROCEDURE — 85027 COMPLETE CBC AUTOMATED: CPT | Performed by: OBSTETRICS & GYNECOLOGY

## 2021-05-07 PROCEDURE — 83001 ASSAY OF GONADOTROPIN (FSH): CPT | Performed by: OBSTETRICS & GYNECOLOGY

## 2021-05-07 PROCEDURE — 82626 DEHYDROEPIANDROSTERONE: CPT | Performed by: OBSTETRICS & GYNECOLOGY

## 2021-05-07 PROCEDURE — 58100 BIOPSY OF UTERUS LINING: CPT | Performed by: OBSTETRICS & GYNECOLOGY

## 2021-05-07 PROCEDURE — 88305 TISSUE EXAM BY PATHOLOGIST: CPT | Performed by: PATHOLOGY

## 2021-05-07 PROCEDURE — 99000 SPECIMEN HANDLING OFFICE-LAB: CPT | Performed by: OBSTETRICS & GYNECOLOGY

## 2021-05-07 PROCEDURE — 83036 HEMOGLOBIN GLYCOSYLATED A1C: CPT | Performed by: OBSTETRICS & GYNECOLOGY

## 2021-05-07 PROCEDURE — 83002 ASSAY OF GONADOTROPIN (LH): CPT | Performed by: OBSTETRICS & GYNECOLOGY

## 2021-05-07 PROCEDURE — 82670 ASSAY OF TOTAL ESTRADIOL: CPT | Performed by: OBSTETRICS & GYNECOLOGY

## 2021-05-07 PROCEDURE — 36415 COLL VENOUS BLD VENIPUNCTURE: CPT | Performed by: OBSTETRICS & GYNECOLOGY

## 2021-05-07 PROCEDURE — 84403 ASSAY OF TOTAL TESTOSTERONE: CPT | Mod: 90 | Performed by: OBSTETRICS & GYNECOLOGY

## 2021-05-07 PROCEDURE — 83498 ASY HYDROXYPROGESTERONE 17-D: CPT | Performed by: OBSTETRICS & GYNECOLOGY

## 2021-05-07 ASSESSMENT — MIFFLIN-ST. JEOR: SCORE: 1647.57

## 2021-05-07 NOTE — PROGRESS NOTES
[facility]    Gynecology Office Note    CC:   Chief Complaint   Patient presents with     Consult        HPI: Noe Fitzgerald is a 51 year old  who presents for EMB. Patient has previously had a telephone consult regarding AUB. Reports daily bleeding for over a month, passing large clots the size of baseballs. See previous history for full details. She was started on OCPs twice a day to try to suppress the bleeding. Started this about 36 hours ago and reports slightly less clotting yesterday. Previously tried Lysteda without any change in bleeding profile.     GYN Hx:     Patient is menopausal: no      Patient's last menstrual period was 2021.    Last Pap Smear:   Lab Results   Component Value Date    PAP OTHER-NIL, See Result 2020         ROS: A 10 pt ROS was completed and found to be otherwise negative unless mentioned in the HPI.     PMH:   Past Medical History:   Diagnosis Date     ASCUS with positive high risk HPV 2015     Gynecological examination      S/P LEEP of cervix     has been >20 yrs since LEEP, no abnls noted, paps moved to Q3 yrs     Supervision of normal first pregnancy      Vaginitis and vulvovaginitis, unspecified        PSHx:   Past Surgical History:   Procedure Laterality Date     ESOPHAGOSCOPY, GASTROSCOPY, DUODENOSCOPY (EGD), COMBINED N/A 2017    Procedure: COMBINED ESOPHAGOSCOPY, GASTROSCOPY, DUODENOSCOPY (EGD);  Surgeon: Damian Ames MD;  Location: [facility]     LEEP TX, CERVICAL  1991    LEEP TX Cervical     MAMMOPLASTY REDUCTION BILATERAL Bilateral 2017    Procedure: MAMMOPLASTY REDUCTION BILATERAL;  Bilateral Breast Reduction Mammoplasty;  Surgeon: Franny Reynolds MD;  Location:  OR     SURGICAL HISTORY OF -       Ear surgery     SURGICAL HISTORY OF -       Foot surgery       OBHx:   OB History    Para Term  AB Living   2 0 0 0 0 2   SAB TAB Ectopic Multiple Live Births   0 0 0 0 2      # Outcome Date  GA Lbr Herve/2nd Weight Sex Delivery Anes PTL Lv   2  07 38w6d 02:48 3.204 kg (7 lb 1 oz) F    FREDRICK      Name: Cande      Apgar1: 8  Apgar5: 9   1  05 39w0d 03:07 3.147 kg (6 lb 15 oz) F IVD EPIDURAL  FREDRICK      Name: Edith Pearson       Apgar1: 9  Apgar5: 9       Medications:   lisinopril (ZESTRIL) 20 MG tablet, Take 1 tablet (20 mg) by mouth daily  metoprolol succinate ER (TOPROL-XL) 25 MG 24 hr tablet, Take 1 tablet (25 mg) by mouth daily  norgestimate-ethinyl estradiol (ORTHO-CYCLEN) 0.25-35 MG-MCG tablet, Take 1 tablet by mouth daily  tranexamic acid (LYSTEDA) 650 MG tablet, Take 1,300 mg by mouth 2 times daily  triamcinolone (KENALOG) 0.1 % external ointment, Apply topically 2 times daily (Patient not taking: Reported on 2021)    No current facility-administered medications on file prior to visit.       Allergies:      Allergies   Allergen Reactions     Bactrim [Sulfamethoxazole W/Trimethoprim] Rash       Social History:   Social History     Socioeconomic History     Marital status:      Spouse name: Not on file     Number of children: Not on file     Years of education: Not on file     Highest education level: Not on file   Occupational History     Not on file   Social Needs     Financial resource strain: Not on file     Food insecurity     Worry: Not on file     Inability: Not on file     Transportation needs     Medical: Not on file     Non-medical: Not on file   Tobacco Use     Smoking status: Never Smoker     Smokeless tobacco: Never Used     Tobacco comment: Pt experiences second hand smoke at work    Substance and Sexual Activity     Alcohol use: No     Comment: rarely     Drug use: No     Sexual activity: Yes     Partners: Male   Lifestyle     Physical activity     Days per week: Not on file     Minutes per session: Not on file     Stress: Not on file   Relationships     Social connections     Talks on phone: Not on file     Gets together: Not on file     Attends  "Mormon service: Not on file     Active member of club or organization: Not on file     Attends meetings of clubs or organizations: Not on file     Relationship status: Not on file     Intimate partner violence     Fear of current or ex partner: Not on file     Emotionally abused: Not on file     Physically abused: Not on file     Forced sexual activity: Not on file   Other Topics Concern     Parent/sibling w/ CABG, MI or angioplasty before 65F 55M? No   Social History Narrative     Not on file         Family History:   Family History   Problem Relation Age of Onset     Hypertension Father        Physical Exam:   Vitals:    05/07/21 0845 05/07/21 0848   BP: (!) 141/95 (!) 134/95   BP Location: Right arm Right arm   Patient Position: Sitting Sitting   Cuff Size: Adult Large Adult Large   Pulse: 81    Resp: 16    Temp: 98.8  F (37.1  C)    TempSrc: Tympanic    Weight: 102.9 kg (226 lb 12.8 oz)    Height: 1.664 m (5' 5.5\")       Estimated body mass index is 37.17 kg/m  as calculated from the following:    Height as of this encounter: 1.664 m (5' 5.5\").    Weight as of this encounter: 102.9 kg (226 lb 12.8 oz).    General appearance: well-hydrated, A&O x 3, no apparent distress  Lungs: Equal expansion bilaterally, no accessory muscle use  Heart: No heaves or thrills. No peripheral varicosities  Constitutional: See vitals  Extremities: no edema  Neuro: CN II-XII grossly intact  Genitourinary:  External genitalia: no erythema, no lesions.   Urethral meatus appropriate location without lesions or prolapse  Urethra: No masses, tenderness, or scarring  Bladder no fullness, masses, or tenderness.  Anus and Perineum: Unremarkable, no visible lesions  Vagina: Normal, healthy pink mucosa without any lesions. Dark red menstrual bleeding with clots  Cervix: normal appearance, no cervical motion tenderness.   Uterus: difficult exam due to body habitus  Adnexa: no masses or tenderness bilaterally.    Labs/Imaging: Reviewed images " from pelvic ultrasound from 4/19/21. Enlarged uterus, 12cm. Thickened heterogenous endometrium, some cystic appearance. Solitary small fibroid.    [facility]    Endometrial Biopsy Procedure Note      Noe Fitzgerald  1970  [id number]    Indications:    Noe Fitzgerald is a 51 year old year old female, who is having an endometrial biopsy for abnormal uterine bleeding    Procedure:  Is a pregnancy test required: Patient unable to urinate today, even after two glasses of water. She reports she has not been sexually active in over a year, recently . Discussed risks of procedure without test. Patient would like to proceed without UPT.    Prior to the start of the procedure, written and verbal consent was obtained from the patient. The patient was then placed in the dorsal lithotomy position.  A speculum was placed in the vagina and the cervix visualized. The cervix was cleaned with betadine swabs x3. A tenaculum was placed on the cervix. A small plastic 5 mm Pipelle syringe curette was passed through the cervix to the fundus with return of large amount of tissue. This was placed in specimen jar and sent for permanent pathology. All instruments were removed.      The patient tolerated the procedure well and she reported there was  cramping.      Post Procedure:    PLAN : Await the results of the biopsy.  She tolerated the procedure well. There were no complications. Patient was discharged in stable condition.    The patient was given post op instructions which included activity and pelvic restrictions.  She was advised to call the clinic if excessive bleeding, pelvic pain, or fever.       Assessment and Plan:     Encounter Diagnoses   Name Primary?     Thickened endometrium Yes     Abnormal uterine bleeding (AUB)      Patient presents with AUB, prolonged bleeding. Pelvic ultrasound reveals thickened endometrium. Here for EMB today, need to rule out endometrial hyperplasia vs.  carcinoma. Also ordered previously discussed labs. Will await results to discuss further management options. She has been on Lysteda in the past, reports this did nothing to improve her bleeding. Started taking OCPs 2 pills per day about 36 hours ago. Reports slight decrease in clotting yesterday. Will continue this regimen until we readdress management after results from tests today.    Health maintenance: pap smear up to date.    Return to clinic in 1 weeks for follow up after EMB results to discuss management. Does not seem to be interested in hormonal management at this point. Doesn't want to be bleeding anymore and would like surgical intervention.     Elly Ray, DO

## 2021-05-07 NOTE — NURSING NOTE
"Initial BP (!) 134/95 (BP Location: Right arm, Patient Position: Sitting, Cuff Size: Adult Large)   Pulse 81   Temp 98.8  F (37.1  C) (Tympanic)   Resp 16   Ht 1.664 m (5' 5.5\")   Wt 102.9 kg (226 lb 12.8 oz)   LMP 04/05/2021   BMI 37.17 kg/m   Estimated body mass index is 37.17 kg/m  as calculated from the following:    Height as of this encounter: 1.664 m (5' 5.5\").    Weight as of this encounter: 102.9 kg (226 lb 12.8 oz). .      "

## 2021-05-10 LAB — 17OHP SERPL-MCNC: 34 NG/DL

## 2021-05-11 LAB — TESTOST SERPL-MCNC: 20 NG/DL (ref 8–60)

## 2021-05-12 LAB — COPATH REPORT: NORMAL

## 2021-05-14 ENCOUNTER — OFFICE VISIT (OUTPATIENT)
Dept: OBGYN | Facility: CLINIC | Age: 51
End: 2021-05-14
Payer: COMMERCIAL

## 2021-05-14 VITALS
TEMPERATURE: 98.6 F | SYSTOLIC BLOOD PRESSURE: 132 MMHG | BODY MASS INDEX: 36.32 KG/M2 | WEIGHT: 226 LBS | HEART RATE: 83 BPM | RESPIRATION RATE: 18 BRPM | DIASTOLIC BLOOD PRESSURE: 87 MMHG | HEIGHT: 66 IN

## 2021-05-14 DIAGNOSIS — N85.01 COMPLEX ENDOMETRIAL HYPERPLASIA: Primary | ICD-10-CM

## 2021-05-14 PROCEDURE — 99213 OFFICE O/P EST LOW 20 MIN: CPT | Performed by: OBSTETRICS & GYNECOLOGY

## 2021-05-14 ASSESSMENT — MIFFLIN-ST. JEOR: SCORE: 1643.94

## 2021-05-14 NOTE — PROGRESS NOTES
Pt presents for follow-up after recent consult with Dr. Ray and EMB results. Please see my virtual consul from 5/3/2021 and Dr. Ray's 5/7/2021 consult. In brief, presented to clinic with AUB and US notable for significantly thickened endometrium with internal flow.  I did review the endometrial biopsy in detail.     Endometrium, biopsy:   - Simple and complex endometrial hyperplasia with focal tubal metaplasia   and without atypia.   - Negative for malignancy.     I did recommend a GYN Onc referral. I did review likely outcome of surgical management and ability of GYN ONC for intra-operative staging, should that be required.   Discussed expected 6 wk recovery.     I spent 10 min with the patient and 17 min in chart review, care coordination and documentation.     Shweta Cadena MD  OB/GYN

## 2021-05-14 NOTE — NURSING NOTE
"Initial /87 (BP Location: Right arm, Patient Position: Chair, Cuff Size: Adult Large)   Pulse 83   Temp 98.6  F (37  C) (Tympanic)   Resp 18   Ht 1.664 m (5' 5.5\")   Wt 102.5 kg (226 lb)   LMP 04/05/2021   Breastfeeding No   BMI 37.04 kg/m   Estimated body mass index is 37.04 kg/m  as calculated from the following:    Height as of this encounter: 1.664 m (5' 5.5\").    Weight as of this encounter: 102.5 kg (226 lb). .      "

## 2021-05-15 LAB — DHEA SERPL-MCNC: 1.68 NG/ML (ref 0.63–4.7)

## 2021-05-18 NOTE — TELEPHONE ENCOUNTER
RECORDS STATUS - ALL OTHER DIAGNOSIS      RECORDS RECEIVED FROM: [facility]   DATE RECEIVED: 5/18   NOTES STATUS DETAILS   OFFICE NOTE from referring provider Shweta Palma MD in [facility]: 5/14/21   OFFICE NOTE from medical oncologist NA    DISCHARGE SUMMARY from hospital NA    DISCHARGE REPORT from the ER NA    OPERATIVE REPORT Epic 5/7/21: Endometrial Bx w/o Cervical Dilation  1/26/17: EUS  12/8/15: Colposcopy   MEDICATION LIST [facility] 5/3/21   CLINICAL TRIAL TREATMENTS TO DATE     LABS     PATHOLOGY REPORTS [facility] 5/7/21, 12/8/15: Surg Path   ANYTHING RELATED TO DIAGNOSIS Epic    GENONOMIC TESTING     TYPE:     IMAGING (NEED IMAGES & REPORT)     CT SCANS     MRI     MAMMO     ULTRASOUND PACS 4/19/21, 11/17/18, 5/16/13: [facility]   PET

## 2021-05-19 ENCOUNTER — VIRTUAL VISIT (OUTPATIENT)
Dept: ONCOLOGY | Facility: CLINIC | Age: 51
End: 2021-05-19
Attending: OBSTETRICS & GYNECOLOGY
Payer: COMMERCIAL

## 2021-05-19 ENCOUNTER — PRE VISIT (OUTPATIENT)
Dept: ONCOLOGY | Facility: CLINIC | Age: 51
End: 2021-05-19

## 2021-05-19 DIAGNOSIS — N85.01 COMPLEX ENDOMETRIAL HYPERPLASIA: Primary | ICD-10-CM

## 2021-05-19 PROCEDURE — 999N001193 HC VIDEO/TELEPHONE VISIT; NO CHARGE

## 2021-05-19 PROCEDURE — 99215 OFFICE O/P EST HI 40 MIN: CPT | Mod: 95 | Performed by: OBSTETRICS & GYNECOLOGY

## 2021-05-19 RX ORDER — CEFAZOLIN SODIUM 2 G/50ML
2 SOLUTION INTRAVENOUS SEE ADMIN INSTRUCTIONS
Status: CANCELLED | OUTPATIENT
Start: 2021-05-19

## 2021-05-19 RX ORDER — HEPARIN SODIUM 5000 [USP'U]/.5ML
5000 INJECTION, SOLUTION INTRAVENOUS; SUBCUTANEOUS
Status: CANCELLED | OUTPATIENT
Start: 2021-05-19

## 2021-05-19 RX ORDER — CEFAZOLIN SODIUM 2 G/50ML
2 SOLUTION INTRAVENOUS
Status: CANCELLED | OUTPATIENT
Start: 2021-05-19

## 2021-05-19 NOTE — LETTER
2021         RE: Noe Fitzgerald  [address] [address]        Dear Colleague,    Thank you for referring your patient, Noe Fitzgerald, to the [facility]. Please see a copy of my visit note below.    Noe is a 51 year old who is being evaluated via a billable video visit.      How would you like to obtain your AVS? MyChart  If the video visit is dropped, the invitation should be resent by: Text to cell phone: [phone]  Will anyone else be joining your video visit? No       I have reviewed and updated the patient's allergies and medication list.    Concerns: none  Refills: none          Ivy Owens CMA    Video-Visit Details    Type of service:  Video Visit    Video time: I spent a total of 50 minutes on video with the patient    Originating Location (pt. Location): Home    Distant Location (provider location):  [facility]     Platform used for Video Visit: [facility]    Gynecologic Oncology Clinic - New Patient Virtual Visit    Patient is being seen as a virtual visit. Consent has been obtained and documented in chart.    Referring provider:    Shweta Cadena MD  [address] [place] [address]    Patient: Noe Fitzgerald  : 1970    Date of Visit: May 19, 2021     Chief Complaint: complex hyperplasia    History of Present Illness:  Noe Fitzgerald is a 51 year old pre-menopausal female who presents due to recent biopsy showing complex endometrial hyperplasia. She reports irregular vaginal bleeding with increasingly heavy periods. History obtained from patient and review of PCP and OB/Gyn notes.     She has monthly menses which had become increasingly heavy. She was having them every month without irregularity until the last few months. She went a couple of months without but otherwise they have been regular. Recent FSH was consistent with pre-menopausal status. The increasing bleeding was  worked up with an ultrasound and a biopsy. The ultrasound was notable for a 3.3cm endometrium. The biopsy showed complex and simple hyperplasia. She continues to have heavy bleeding and wonders if she could take something. She has used tranexamic acid, but has reached the maximum doses. Birth control pills have not helped.      Review of Systems:  As per HPI, otherwise non-contributory.     OB/Gynecologic History:     Last Pap Smear: 2020 negative/HPV negative, endometrial cells. History of abnormal: Yes, remote history of LEEP, no abnormal since and >25 years since LEEP    Past Medical History  Past Medical History:   Diagnosis Date     ASCUS with positive high risk HPV 2015     Essential hypertension      S/P LEEP of cervix 1991    has been >20 yrs since LEEP, no abnls noted, paps moved to Q3 yrs     Supervision of normal first pregnancy      Vaginitis and vulvovaginitis, unspecified        Past Surgical History  Past Surgical History:   Procedure Laterality Date     ESOPHAGOSCOPY, GASTROSCOPY, DUODENOSCOPY (EGD), COMBINED N/A 2017    (EGD) ESOPHAGOSCOPY, GASTROSCOPY, DUODENOSCOPY (EGD);  Surgeon: Damian Ames MD;  Location: [facility]     FOOT SURGERY      Foot surgery     LEEP TX, CERVICAL  1991    LEEP TX Cervical     MAMMOPLASTY REDUCTION BILATERAL Bilateral 2017    Procedure: MAMMOPLASTY REDUCTION BILATERAL;  Bilateral Breast Reduction Mammoplasty;  Surgeon: Franny Reynolds MD;  [facility]     TYMPANOPLASTY CHILD      7th grader       Social History  Social History     Tobacco Use     Smoking status: Never Smoker     Smokeless tobacco: Never Used     Tobacco comment: Pt experiences second hand smoke at work    Substance Use Topics     Alcohol use: No     Comment: rarely     Drug use: No     Family History  Family History   Problem Relation Age of Onset     Hypertension Father        Current Outpatient Medications   Medication Sig Dispense Refill     lisinopril  "(ZESTRIL) 20 MG tablet Take 1 tablet (20 mg) by mouth daily 90 tablet 1     megestrol (MEGACE) 40 MG tablet Take 2 tablets (80 mg) by mouth daily 30 tablet 0     metoprolol succinate ER (TOPROL-XL) 25 MG 24 hr tablet Take 1 tablet (25 mg) by mouth daily 30 tablet 0     norgestimate-ethinyl estradiol (ORTHO-CYCLEN) 0.25-35 MG-MCG tablet Take 1 tablet by mouth daily 86 tablet 3     tranexamic acid (LYSTEDA) 650 MG tablet Take 1,300 mg by mouth 2 times daily       triamcinolone (KENALOG) 0.1 % external ointment Apply topically 2 times daily (Patient not taking: Reported on 5/7/2021) 30 g 1        Allergies  Allergies   Allergen Reactions     Bactrim [Sulfamethoxazole W/Trimethoprim] Rash       Physical Exam:   There were no vitals taken for this visit.  General appearance: Alert and oriented, no acute distress, well groomed    Pathology:   Lab Results   Component Value Date    PATH  05/07/2021     Patient Name: LISA PRATT  MR#: [id number]  Specimen #: [id number]  Collected: 5/7/2021  Received: 5/10/2021  Reported: 5/12/2021 17:01  Ordering Phy(s): JOSEP HERNÁNDEZ    For improved result formatting, select 'View Enhanced Report Format' under   Linked Documents section.    SPECIMEN(S):  Endometrial biopsy    FINAL DIAGNOSIS:  Endometrium, biopsy:  - Simple and complex endometrial hyperplasia with focal tubal metaplasia   and without atypia.  - Negative for malignancy.    COMMENT:  Intradepartmental consultation is obtained.    Electronically signed out by:    Catherine Carreno M.D.    CLINICAL HISTORY:  51 year old female.  Abnormal uterine bleeding.    GROSS:  The specimen is received in formalin with proper patient identification   labeled \"endometrial biopsy \".  The  specimen consists of pink spongy tissue fragments and hemorrhagic coagulum   measuring up to 1.6 cm in  aggregate. The specimen is filtered over lens paper.  The specimen is   entirely submitted in one cassette.  (Dictated by: Elpidio Johnson " 5/10/2021 08:27 AM)    MICROSCOPIC:  Microscopic examination is performed.    The technical component of this testing was completed at the [facility], with the professional component performed   at the [facility]  Laboratory, [address] [place] [address]  [phone] ([phone])    CPT Codes:  A: 67765-PU6    COLLECTION SITE:  Client: [facility]  Location: [facility] ()           Labs:  Reviewed FSH, TSH, HgbA1c, CBC (hgb 11.4)  Pap/HPV 2020 reviewed    Imaging:   I have personally reviewed the ultrasound images which show uniformly/diffusely thickened, irregular/heterogenous lining of the uterus without discrete mass. These findings are concerning for EIN or malignancy.     PELVIC ULTRASOUND WITH ENDOVAGINAL TRANSDUCER  April 19, 2021 8:17 PM      HISTORY: Heavy periods. Excessive bleeding in premenopausal period.     TECHNIQUE: Endovaginal sonography was added to the transabdominal  exam.     COMPARISON: Pelvic ultrasound 11/17/2018.     FINDINGS:   Endometrium: Endometrium is 3.3 cm with evidence of internal flow and  a few scattered cystic areas.     Uterus: Measures 12.0 x 4.8 x 5.6 cm. There is 1.1 x 1.0 x 1.1 cm  predominantly subserosal right anterior mid uterine body fibroid.  Multiple cervical nabothian cysts.     Right ovary: Measures 2.1 x 2.1 x 2.7 cm. It demonstrates normal  follicular structure and color-flow.     Left ovary: Not well seen and/obscured by overlying bowel gas.     Additional findings: No significant free fluid in the pelvis.                                                                      IMPRESSION:    1. Significantly thickened endometrial stripe measuring 3.3 cm with  evidence of internal flow, findings worrisome for endometrial  hyperplasia/neoplasia. This can be further evaluated with tissue  sampling.  2. Multiple cervical nabothian cysts.  3. Small subserosal uterine fibroid.  4.  The right ovary is unremarkable. The left ovary is not  visualized/obscured by overlying bowel gas. No suspicious adnexal  masses visualized.     ARUNA RODAS MD    Assessment:  Noe Fitzgerald is a 51 year old pre-menopausal female with BMI 37 who has had irregular uterine bleeding and ultrasound showing significantly thickened endometrium with biopsy showing hyperplasia.    Plan:   Complex hyperplasia: We reviewed this diagnosis which can be considered a pre-cancerous finding of the endometrium or lining of the uterus, although perhaps less so without cytologic atypia, the fact that she has complex hyperplasia with a significantly thickened endometrium certainly raises the concern for a pre-cancerous or cancerous process. We discussed that hysterectomy can be both diagnostic and therapeutic.   We discussed alternatives including hysteroscopy, D&C prior to surgery to confirm diagnosis, but I feel that hysterectomy will be ultimately needed and thus we can achieve the same end with frozen section at the time of hysterectomy. We also discussed hormonal options and limitations of this  We reviewed pre-menopausal oophorectomy. We discussed risks/benefits of removing the ovaries including the risks of pre-mature surgical menopause and associated symptoms and risks. After discussion of all of the risks/benefits, she would like to proceed with bilateral salpingo-oophorectomy at the time of her hysterectomy regardless of the pathology. We discussed hysterectomy with bilateral salpingo-oophorectomy and frozen section to rule out cancer. If cancer is identified, we will apply Jackson criteria to the tumor on frozen section to determine if lymph node assessment is necessary. She has verbally consented to the procedure indicated based upon pathology results.  - I recommend a total laparoscopic hysterectomy, removal of both tubes and ovaries, We reviewed the risks of surgery including risk of infection, bleeding, damage  to surrounding structures or organs including bladder, ureters, bowel, blood vessels, or nerves. Risk of need for additional procedures or blood transfusion if complications. Blood transfusions carry additional risks of transfusion reactions, damaged to lung/kidneys, fevers, death. Risk of blood clot/pulmonary embolism, which can be fatal. We reviewed risk of needing full lymph node dissection. Lymph node resection brings risks of numbness of mons, upper thigh, vulva and lymphedema, which we think are reduced with sentinel lymph node dissection. I also reviewed the use of vaginal manipulator and vaginal specimen removal with risk of vaginal laceration/repair. I discussed the risk of vaginal cuff dehiscence (separation) or wound dehiscence with need for further surgery.   Pre-op: pre-op clearance with PCP locally. She will need hCG day of surgery due to pre-menopausal status. Surgery will be outpatient         Robert Lyons MD    Division of Gynecologic Oncology  Department of Ob/Gyn and Women's Health  [facility]       Again, thank you for allowing me to participate in the care of your patient.        Sincerely,        Robert Lyons MD

## 2021-05-19 NOTE — PROGRESS NOTES
Noe is a 51 year old who is being evaluated via a billable video visit.      How would you like to obtain your AVS? MyChart  If the video visit is dropped, the invitation should be resent by: Text to cell phone: [phone]  Will anyone else be joining your video visit? No       I have reviewed and updated the patient's allergies and medication list.    Concerns: none  Refills: none          Ivy Owens CMA    Video-Visit Details    Type of service:  Video Visit    Video time: I spent a total of 50 minutes on video with the patient    Originating Location (pt. Location): Home    Distant Location (provider location):  [facility]     Platform used for Video Visit: [facility]    Gynecologic Oncology Clinic - New Patient Virtual Visit    Patient is being seen as a virtual visit. Consent has been obtained and documented in chart.    Referring provider:    Shweta Cadena MD  [address] [place] [address]    Patient: Noe Fitzgerald  : 1970    Date of Visit: May 19, 2021     Chief Complaint: complex hyperplasia    History of Present Illness:  Noe Fitzgerald is a 51 year old pre-menopausal female who presents due to recent biopsy showing complex endometrial hyperplasia. She reports irregular vaginal bleeding with increasingly heavy periods. History obtained from patient and review of PCP and OB/Gyn notes.     She has monthly menses which had become increasingly heavy. She was having them every month without irregularity until the last few months. She went a couple of months without but otherwise they have been regular. Recent FSH was consistent with pre-menopausal status. The increasing bleeding was worked up with an ultrasound and a biopsy. The ultrasound was notable for a 3.3cm endometrium. The biopsy showed complex and simple hyperplasia. She continues to have heavy bleeding and wonders if she could take something. She has used tranexamic acid, but has  reached the maximum doses. Birth control pills have not helped.      Review of Systems:  As per HPI, otherwise non-contributory.     OB/Gynecologic History:     Last Pap Smear: 2020 negative/HPV negative, endometrial cells. History of abnormal: Yes, remote history of LEEP, no abnormal since and >25 years since LEEP    Past Medical History  Past Medical History:   Diagnosis Date     ASCUS with positive high risk HPV 2015     Essential hypertension      S/P LEEP of cervix 1991    has been >20 yrs since LEEP, no abnls noted, paps moved to Q3 yrs     Supervision of normal first pregnancy      Vaginitis and vulvovaginitis, unspecified        Past Surgical History  Past Surgical History:   Procedure Laterality Date     ESOPHAGOSCOPY, GASTROSCOPY, DUODENOSCOPY (EGD), COMBINED N/A 2017    (EGD) ESOPHAGOSCOPY, GASTROSCOPY, DUODENOSCOPY (EGD);  Surgeon: Damian Ames MD;  Location: [facility]     FOOT SURGERY      Foot surgery     LEEP TX, CERVICAL  1991    LEEP TX Cervical     MAMMOPLASTY REDUCTION BILATERAL Bilateral 2017    Procedure: MAMMOPLASTY REDUCTION BILATERAL;  Bilateral Breast Reduction Mammoplasty;  Surgeon: Franny Reynolds MD;  [facility]     TYMPANOPLASTY CHILD      7th grader       Social History  Social History     Tobacco Use     Smoking status: Never Smoker     Smokeless tobacco: Never Used     Tobacco comment: Pt experiences second hand smoke at work    Substance Use Topics     Alcohol use: No     Comment: rarely     Drug use: No     Family History  Family History   Problem Relation Age of Onset     Hypertension Father        Current Outpatient Medications   Medication Sig Dispense Refill     lisinopril (ZESTRIL) 20 MG tablet Take 1 tablet (20 mg) by mouth daily 90 tablet 1     megestrol (MEGACE) 40 MG tablet Take 2 tablets (80 mg) by mouth daily 30 tablet 0     metoprolol succinate ER (TOPROL-XL) 25 MG 24 hr tablet Take 1 tablet (25 mg) by mouth daily 30  "tablet 0     norgestimate-ethinyl estradiol (ORTHO-CYCLEN) 0.25-35 MG-MCG tablet Take 1 tablet by mouth daily 86 tablet 3     tranexamic acid (LYSTEDA) 650 MG tablet Take 1,300 mg by mouth 2 times daily       triamcinolone (KENALOG) 0.1 % external ointment Apply topically 2 times daily (Patient not taking: Reported on 5/7/2021) 30 g 1        Allergies  Allergies   Allergen Reactions     Bactrim [Sulfamethoxazole W/Trimethoprim] Rash       Physical Exam:   There were no vitals taken for this visit.  General appearance: Alert and oriented, no acute distress, well groomed    Pathology:   Lab Results   Component Value Date    PATH  05/07/2021     Patient Name: LISA PRATT  MR#: [id number]  Specimen #: [id number]  Collected: 5/7/2021  Received: 5/10/2021  Reported: 5/12/2021 17:01  Ordering Phy(s): JOSEP HERNÁNDEZ    For improved result formatting, select 'View Enhanced Report Format' under   Linked Documents section.    SPECIMEN(S):  Endometrial biopsy    FINAL DIAGNOSIS:  Endometrium, biopsy:  - Simple and complex endometrial hyperplasia with focal tubal metaplasia   and without atypia.  - Negative for malignancy.    COMMENT:  Intradepartmental consultation is obtained.    Electronically signed out by:    Catherine Carreno M.D.    CLINICAL HISTORY:  51 year old female.  Abnormal uterine bleeding.    GROSS:  The specimen is received in formalin with proper patient identification   labeled \"endometrial biopsy \".  The  specimen consists of pink spongy tissue fragments and hemorrhagic coagulum   measuring up to 1.6 cm in  aggregate. The specimen is filtered over lens paper.  The specimen is   entirely submitted in one cassette.  (Dictated by: Elpidio Johnson 5/10/2021 08:27 AM)    MICROSCOPIC:  Microscopic examination is performed.    The technical component of this testing was completed at the [facility], with the professional component performed   at the " [facility]  Laboratory, [address] [facility] [address], [place]  [phone] ([phone])    CPT Codes:  A: 62327-NO8    COLLECTION SITE:  Client: [facility]  Location: [facility] ([facility])           Labs:  Reviewed FSH, TSH, HgbA1c, CBC (hgb 11.4)  Pap/HPV 2020 reviewed    Imaging:   I have personally reviewed the ultrasound images which show uniformly/diffusely thickened, irregular/heterogenous lining of the uterus without discrete mass. These findings are concerning for EIN or malignancy.     PELVIC ULTRASOUND WITH ENDOVAGINAL TRANSDUCER  April 19, 2021 8:17 PM      HISTORY: Heavy periods. Excessive bleeding in premenopausal period.     TECHNIQUE: Endovaginal sonography was added to the transabdominal  exam.     COMPARISON: Pelvic ultrasound 11/17/2018.     FINDINGS:   Endometrium: Endometrium is 3.3 cm with evidence of internal flow and  a few scattered cystic areas.     Uterus: Measures 12.0 x 4.8 x 5.6 cm. There is 1.1 x 1.0 x 1.1 cm  predominantly subserosal right anterior mid uterine body fibroid.  Multiple cervical nabothian cysts.     Right ovary: Measures 2.1 x 2.1 x 2.7 cm. It demonstrates normal  follicular structure and color-flow.     Left ovary: Not well seen and/obscured by overlying bowel gas.     Additional findings: No significant free fluid in the pelvis.                                                                      IMPRESSION:    1. Significantly thickened endometrial stripe measuring 3.3 cm with  evidence of internal flow, findings worrisome for endometrial  hyperplasia/neoplasia. This can be further evaluated with tissue  sampling.  2. Multiple cervical nabothian cysts.  3. Small subserosal uterine fibroid.  4. The right ovary is unremarkable. The left ovary is not  visualized/obscured by overlying bowel gas. No suspicious adnexal  masses visualized.     ARUNA RODAS MD    Assessment:  Noe Fitzgerald is a 51 year old pre-menopausal female with BMI 37 who  has had irregular uterine bleeding and ultrasound showing significantly thickened endometrium with biopsy showing hyperplasia.    Plan:   Complex hyperplasia: We reviewed this diagnosis which can be considered a pre-cancerous finding of the endometrium or lining of the uterus, although perhaps less so without cytologic atypia, the fact that she has complex hyperplasia with a significantly thickened endometrium certainly raises the concern for a pre-cancerous or cancerous process. We discussed that hysterectomy can be both diagnostic and therapeutic.   We discussed alternatives including hysteroscopy, D&C prior to surgery to confirm diagnosis, but I feel that hysterectomy will be ultimately needed and thus we can achieve the same end with frozen section at the time of hysterectomy. We also discussed hormonal options and limitations of this  We reviewed pre-menopausal oophorectomy. We discussed risks/benefits of removing the ovaries including the risks of pre-mature surgical menopause and associated symptoms and risks. After discussion of all of the risks/benefits, she would like to proceed with bilateral salpingo-oophorectomy at the time of her hysterectomy regardless of the pathology. We discussed hysterectomy with bilateral salpingo-oophorectomy and frozen section to rule out cancer. If cancer is identified, we will apply Jackson criteria to the tumor on frozen section to determine if lymph node assessment is necessary. She has verbally consented to the procedure indicated based upon pathology results.  - I recommend a total laparoscopic hysterectomy, removal of both tubes and ovaries, We reviewed the risks of surgery including risk of infection, bleeding, damage to surrounding structures or organs including bladder, ureters, bowel, blood vessels, or nerves. Risk of need for additional procedures or blood transfusion if complications. Blood transfusions carry additional risks of transfusion reactions, damaged to  lung/kidneys, fevers, death. Risk of blood clot/pulmonary embolism, which can be fatal. We reviewed risk of needing full lymph node dissection. Lymph node resection brings risks of numbness of mons, upper thigh, vulva and lymphedema, which we think are reduced with sentinel lymph node dissection. I also reviewed the use of vaginal manipulator and vaginal specimen removal with risk of vaginal laceration/repair. I discussed the risk of vaginal cuff dehiscence (separation) or wound dehiscence with need for further surgery.   Pre-op: pre-op clearance with PCP locally. She will need hCG day of surgery due to pre-menopausal status. Surgery will be outpatient         Robert Lyons MD    Division of Gynecologic Oncology  Department of Ob/Gyn and Women's Health  [facility]

## 2021-05-20 ENCOUNTER — DOCUMENTATION ONLY (OUTPATIENT)
Dept: ONCOLOGY | Facility: CLINIC | Age: 51
End: 2021-05-20

## 2021-05-20 PROBLEM — N85.01 COMPLEX ENDOMETRIAL HYPERPLASIA: Status: ACTIVE | Noted: 2021-05-20

## 2021-05-20 RX ORDER — MEGESTROL ACETATE 40 MG/1
80 TABLET ORAL DAILY
Qty: 30 TABLET | Refills: 0 | Status: SHIPPED | OUTPATIENT
Start: 2021-05-20 | End: 2022-08-30

## 2021-05-20 NOTE — PROGRESS NOTES
RN: [facility] patient     Surgery is scheduled with Dr. Lyons on 6/25 at [place].  Scheduled per RN/patient/availability.    H&P: to be completed by PCP or Surgeon    Additional appointments:   NO ADDITIONAL APPOINTMENTS NEEDED AT THIS TIME    COVID-19 test: 6/22 at [place]     Post-op: will be scheduled by the clinic.     The RN completed the education regarding the surgery.     Patient will receive a phone call from pre-admission nurses 1-2 days prior to surgery with arrival and start time.    The surgery packet was provided by the RN during appointment.    Patient will complete COVID-19 test that was scheduled by surgical coordinator 2-4 days prior to surgery.     Per communication - I did not need to reach out to the patient regarding the above information. If this changes, I will reach out.

## 2021-05-20 NOTE — PATIENT INSTRUCTIONS
Surgery to be scheduled  Pre operative visit with PAC/PCP, scheduling will call you to help make appointments   Post operative visit will be set up about 2 weeks after your surgery, you will be contacted with at date and time

## 2021-05-20 NOTE — NURSING NOTE
Pre Op Nurse Teaching Template    Relevant Diagnosis: complex endometrial hyperplasia    Teaching Topic: laparoscopic removal of uterus, bilateral fallopian tubes and ovaries    Person(s) involved in teaching :  Patient   preop packet to be mailed to home address and reviewed via telephone   Motivation Level:  Asks Questions:    Yes      Eager to Learn:     Yes     Cooperative:          Yes    Receptive (willing. Able to accept information):    Yes      Patient and those who are listed above demonstrates understanding of the following:   Reason for the appointment, diagnosis and treatment plan:   Yes   Knowledge of proper use of medications and conditions for which they are ordered (with special attention to potential side effects or drug interactions): Yes   Which situations necessitate calling provider and whom to contact: Yes         Nutritional needs and diet plan:  Yes      Pain management techniques:     Yes, Pain Scale   Diet:   Yes, [facility] Diet Instructions    Teaching Concerns addressed: Yes    Infection Prevention:  Patient and those who are listed above demonstrate understanding of the following:  Pre-Op CHG Bathing Instructions: Yes  Surgical procedure site care taught:   Yes   Signs and symptoms of infection taught: Yes       Instructional Materials Used/Given:  The [place] Before You Surgery Booklet  Showering or Bathing before Surgery Instructions & CHG Product  Hysterectomy Guidelines  Pain Assessment Tool   Home Care after Major Abdominal or Vaginal Surgery  Map  Accommodations Brochure  Phone numbers for [facility] and [facility]  Copy of Surgical Consent    Done Today:    Preop Visit needed/ with pCP  Tests Ordered:  Post Op Visit Scheduled:tbd  Comments:    Surgery date/time:tbd    covid test to be done 3-4 days before surgery date    Consent to file in medical records  .

## 2021-05-26 ENCOUNTER — DOCUMENTATION ONLY (OUTPATIENT)
Dept: ONCOLOGY | Facility: CLINIC | Age: 51
End: 2021-05-26

## 2021-05-26 NOTE — PROGRESS NOTES
RN: Angie Gomez    Surgery is rescheduled with Dr. Lyons on 6/3 at [place].  Rescheduled per patient wanted sooner date.    H&P: to be completed by PCP or Surgeon    Additional appointments:   NO ADDITIONAL APPOINTMENTS NEEDED AT THIS TIME    COVID-19 test: 6/2 at [place]     Post-op: will be scheduled by the clinic.     The RN completed the education regarding the surgery.     Patient will receive a phone call from pre-admission nurses 1-2 days prior to surgery with arrival and start time.    The surgery packet was provided by the RN during appointment.    Patient will complete COVID-19 test that was scheduled by surgical coordinator 2-4 days prior to surgery.     Per communication - I did not need to reach out to the patient regarding the above information. If this changes, I will reach out.

## 2021-05-27 ENCOUNTER — OFFICE VISIT (OUTPATIENT)
Dept: FAMILY MEDICINE | Facility: CLINIC | Age: 51
End: 2021-05-27
Payer: COMMERCIAL

## 2021-05-27 VITALS
HEART RATE: 91 BPM | OXYGEN SATURATION: 99 % | BODY MASS INDEX: 36.87 KG/M2 | DIASTOLIC BLOOD PRESSURE: 88 MMHG | RESPIRATION RATE: 20 BRPM | SYSTOLIC BLOOD PRESSURE: 122 MMHG | TEMPERATURE: 98.1 F | WEIGHT: 225 LBS

## 2021-05-27 DIAGNOSIS — N85.01 COMPLEX ENDOMETRIAL HYPERPLASIA: ICD-10-CM

## 2021-05-27 DIAGNOSIS — Z11.59 ENCOUNTER FOR SCREENING FOR OTHER VIRAL DISEASES: ICD-10-CM

## 2021-05-27 DIAGNOSIS — I10 HYPERTENSION GOAL BP (BLOOD PRESSURE) < 140/90: ICD-10-CM

## 2021-05-27 DIAGNOSIS — Z01.818 PREOP GENERAL PHYSICAL EXAM: Primary | ICD-10-CM

## 2021-05-27 DIAGNOSIS — I77.810 ASCENDING AORTA DILATATION (H): ICD-10-CM

## 2021-05-27 DIAGNOSIS — N92.4 EXCESSIVE BLEEDING IN PREMENOPAUSAL PERIOD: ICD-10-CM

## 2021-05-27 LAB
ALBUMIN SERPL-MCNC: 3.4 G/DL (ref 3.4–5)
ALP SERPL-CCNC: 55 U/L (ref 40–150)
ALT SERPL W P-5'-P-CCNC: 20 U/L (ref 0–50)
ANION GAP SERPL CALCULATED.3IONS-SCNC: 4 MMOL/L (ref 3–14)
AST SERPL W P-5'-P-CCNC: 9 U/L (ref 0–45)
BASOPHILS # BLD AUTO: 0 10E9/L (ref 0–0.2)
BASOPHILS NFR BLD AUTO: 0.4 %
BILIRUB SERPL-MCNC: 0.3 MG/DL (ref 0.2–1.3)
BUN SERPL-MCNC: 16 MG/DL (ref 7–30)
CALCIUM SERPL-MCNC: 8.7 MG/DL (ref 8.5–10.1)
CHLORIDE SERPL-SCNC: 112 MMOL/L (ref 94–109)
CO2 SERPL-SCNC: 25 MMOL/L (ref 20–32)
CREAT SERPL-MCNC: 0.91 MG/DL (ref 0.52–1.04)
DIFFERENTIAL METHOD BLD: ABNORMAL
EOSINOPHIL # BLD AUTO: 0.2 10E9/L (ref 0–0.7)
EOSINOPHIL NFR BLD AUTO: 3 %
ERYTHROCYTE [DISTWIDTH] IN BLOOD BY AUTOMATED COUNT: 13.4 % (ref 10–15)
GFR SERPL CREATININE-BSD FRML MDRD: 73 ML/MIN/{1.73_M2}
GLUCOSE SERPL-MCNC: 103 MG/DL (ref 70–99)
HCT VFR BLD AUTO: 33.3 % (ref 35–47)
HGB BLD-MCNC: 10.7 G/DL (ref 11.7–15.7)
LYMPHOCYTES # BLD AUTO: 3.2 10E9/L (ref 0.8–5.3)
LYMPHOCYTES NFR BLD AUTO: 46.9 %
MCH RBC QN AUTO: 27.9 PG (ref 26.5–33)
MCHC RBC AUTO-ENTMCNC: 32.1 G/DL (ref 31.5–36.5)
MCV RBC AUTO: 87 FL (ref 78–100)
MONOCYTES # BLD AUTO: 0.6 10E9/L (ref 0–1.3)
MONOCYTES NFR BLD AUTO: 8.1 %
NEUTROPHILS # BLD AUTO: 2.8 10E9/L (ref 1.6–8.3)
NEUTROPHILS NFR BLD AUTO: 41.6 %
PLATELET # BLD AUTO: 343 10E9/L (ref 150–450)
POTASSIUM SERPL-SCNC: 4.4 MMOL/L (ref 3.4–5.3)
PROT SERPL-MCNC: 7.2 G/DL (ref 6.8–8.8)
RBC # BLD AUTO: 3.83 10E12/L (ref 3.8–5.2)
SODIUM SERPL-SCNC: 141 MMOL/L (ref 133–144)
WBC # BLD AUTO: 6.8 10E9/L (ref 4–11)

## 2021-05-27 PROCEDURE — 36415 COLL VENOUS BLD VENIPUNCTURE: CPT | Performed by: FAMILY MEDICINE

## 2021-05-27 PROCEDURE — 80053 COMPREHEN METABOLIC PANEL: CPT | Performed by: FAMILY MEDICINE

## 2021-05-27 PROCEDURE — 85025 COMPLETE CBC W/AUTO DIFF WBC: CPT | Performed by: FAMILY MEDICINE

## 2021-05-27 PROCEDURE — 99214 OFFICE O/P EST MOD 30 MIN: CPT | Performed by: FAMILY MEDICINE

## 2021-05-27 RX ORDER — METOPROLOL SUCCINATE 25 MG/1
25 TABLET, EXTENDED RELEASE ORAL DAILY
Qty: 90 TABLET | Refills: 3 | Status: SHIPPED | OUTPATIENT
Start: 2021-05-27 | End: 2022-07-21

## 2021-05-27 RX ORDER — LISINOPRIL 20 MG/1
20 TABLET ORAL DAILY
Qty: 90 TABLET | Refills: 3 | Status: SHIPPED | OUTPATIENT
Start: 2021-05-27 | End: 2021-09-20

## 2021-05-27 NOTE — PROGRESS NOTES
[facility]  [address] [place] [id number]  Phone: [phone]  Fax: [phone]  Primary Provider: Jose Alejandro Harvey  Pre-op Performing Provider: JOSE ALEJANDRO HARVEY      PREOPERATIVE EVALUATION:  Today's date: 5/27/2021    Noe Fitzgerald is a 51 year old female who presents for a preoperative evaluation.    Surgical Information:  Surgery/Procedure: Total laparoscopic hysterectomy bilateral salpingo-oophorectomy  Surgery Location: [facility]  Surgeon: Dr. Hood  Surgery Date: 6/03/2021  Time of Surgery:   Where patient plans to recover: At home with family  Fax number for surgical facility: Note does not need to be faxed, will be available electronically in Epic.    Type of Anesthesia Anticipated: General    Assessment & Plan     The proposed surgical procedure is considered INTERMEDIATE risk.    Preop general physical exam  Ok for surgery    Excessive bleeding in premenopausal period  DUB  Hysterectomy as planned  - CBC with platelets and differential    Hypertension goal BP (blood pressure) < 140/90  Well controlled  - CBC with platelets and differential  - Comprehensive metabolic panel    Complex endometrial hyperplasia  Surgery as planned  - CBC with platelets and differential         Risks and Recommendations:  The patient has the following additional risks and recommendations for perioperative complications:   - No identified additional risk factors other than previously addressed    Medication Instructions:  Patient is to take all scheduled medications on the day of surgery  Except don't take megestrol, stop that    RECOMMENDATION:  APPROVAL GIVEN to proceed with proposed procedure, without further diagnostic evaluation.            Subjective     HPI related to upcoming procedure: had heavy periods, ultrasound showed thickened endometrium, endometrial biopsy showed complex endometrial hyperplasia    Preop Questions 5/27/2021   1. Have you ever had a heart attack or  stroke? No   2. Have you ever had surgery on your heart or blood vessels, such as a stent placement, a coronary artery bypass, or surgery on an artery in your head, neck, heart, or legs? No   3. Do you have chest pain with activity? No   4. Do you have a history of  heart failure? No   5. Do you currently have a cold, bronchitis or symptoms of other infection? No   6. Do you have a cough, shortness of breath, or wheezing? No   7. Do you or anyone in your family have previous history of blood clots? No   8. Do you or does anyone in your family have a serious bleeding problem such as prolonged bleeding following surgeries or cuts? No   9. Have you ever had problems with anemia or been told to take iron pills? No   10. Have you had any abnormal blood loss such as black, tarry or bloody stools, or abnormal vaginal bleeding? YES - heavy periods   11. Have you ever had a blood transfusion? No   12. Are you willing to have a blood transfusion if it is medically needed before, during, or after your surgery? Yes   13. Have you or any of your relatives ever had problems with anesthesia? No   14. Do you have sleep apnea, excessive snoring or daytime drowsiness? No   15. Do you have any artifical heart valves or other implanted medical devices like a pacemaker, defibrillator, or continuous glucose monitor? No   16. Do you have artificial joints? No   17. Are you allergic to latex? No   18. Is there any chance that you may be pregnant? No       Health Care Directive:  Patient does not have a Health Care Directive or Living Will: Advance Directive received and scanned. Click on Code in the patient header to view.    Preoperative Review of :   reviewed - no record of controlled substances prescribed.      Status of Chronic Conditions:  See problem list for active medical problems.  Problems all longstanding and stable, except as noted/documented.  See ROS for pertinent symptoms related to these conditions.    hypertension    On lisinopril, metoprolol  BP Readings from Last 6 Encounters:   05/27/21 122/88   05/14/21 132/87   05/07/21 (!) 134/95   04/08/21 139/89   02/03/20 124/88   06/03/19 (!) 136/91      aortic dilatation  Mildly dilated on Echocardiogram in 2016. Needs repeat    Review of Systems  Constitutional, neuro, ENT, endocrine, pulmonary, cardiac, gastrointestinal, genitourinary, musculoskeletal, integument and psychiatric systems are negative, except as otherwise noted.    Patient Active Problem List    Diagnosis Date Noted     Complex endometrial hyperplasia 05/20/2021     Priority: Medium     Added automatically from request for surgery [id number]       Obesity (BMI 35.0-39.9) with comorbidity (H) 10/25/2018     Priority: Medium     Papanicolaou smear of cervix with atypical squamous cells of undetermined significance (ASC-US) 02/17/2017     Priority: Medium     Ascending aorta dilatation (H) 09/12/2016     Priority: Medium     Per cardiology, put on beta blocker and recheck echocardiogram in one year       ASCUS with positive high risk HPV cervical 12/02/2015     Priority: Medium     11/23/2015:Pap--ASCUS, +HR HPV.   12/08/15 Colpo benign. Plan: co-test in 12 months.  2/17/17:Pap--NIL, neg HPV. Repeat Pap+HPV in 3 yrs. Due 2020  2/3/20 NIL pap (endo cells present), neg HPV. Plan: 5 year cotest       Hypertension goal BP (blood pressure) < 140/90 10/17/2012     Priority: Medium     CARDIOVASCULAR SCREENING; LDL GOAL LESS THAN 160 10/31/2010     Priority: Medium     Large breasts 10/27/2008     Priority: Medium      Past Medical History:   Diagnosis Date     ASCUS with positive high risk HPV 11/01/2015     Essential hypertension      S/P LEEP of cervix 01/01/1991    has been >20 yrs since LEEP, no abnls noted, paps moved to Q3 yrs     Supervision of normal first pregnancy      Vaginitis and vulvovaginitis, unspecified      Past Surgical History:   Procedure Laterality Date     ESOPHAGOSCOPY, GASTROSCOPY, DUODENOSCOPY (EGD),  COMBINED N/A 01/26/2017    (EGD) ESOPHAGOSCOPY, GASTROSCOPY, DUODENOSCOPY (EGD);  Surgeon: Damian Ames MD;  Location: [facility]     FOOT SURGERY      Foot surgery     LEEP TX, CERVICAL  01/01/1991    LEEP TX Cervical     MAMMOPLASTY REDUCTION BILATERAL Bilateral 05/22/2017    Procedure: MAMMOPLASTY REDUCTION BILATERAL;  Bilateral Breast Reduction Mammoplasty;  Surgeon: Franny Reynolds MD;  [facility]     TYMPANOPLASTY CHILD      5th grader     Current Outpatient Medications   Medication Sig Dispense Refill     lisinopril (ZESTRIL) 20 MG tablet Take 1 tablet (20 mg) by mouth daily 90 tablet 1     megestrol (MEGACE) 40 MG tablet Take 2 tablets (80 mg) by mouth daily 30 tablet 0     metoprolol succinate ER (TOPROL-XL) 25 MG 24 hr tablet Take 1 tablet (25 mg) by mouth daily 30 tablet 0       Allergies   Allergen Reactions     Bactrim [Sulfamethoxazole W/Trimethoprim] Rash        Social History     Tobacco Use     Smoking status: Never Smoker     Smokeless tobacco: Never Used     Tobacco comment: Pt experiences second hand smoke at work    Substance Use Topics     Alcohol use: No     Comment: rarely     Family History   Problem Relation Age of Onset     Hypertension Father      Colon Cancer No family hx of      Endometrial Cancer No family hx of      Breast Cancer No family hx of      Ovarian Cancer No family hx of      History   Drug Use No         Objective     /88 (BP Location: Right arm)   Pulse 91   Temp 98.1  F (36.7  C) (Tympanic)   Resp 20   Wt 102.1 kg (225 lb)   LMP 04/03/2021   SpO2 99%   BMI 36.87 kg/m      Physical Exam    GENERAL APPEARANCE: healthy, alert and no distress     EYES: EOMI, PERRL     HENT: ear canals and TM's normal and nose and mouth without ulcers or lesions     NECK: no adenopathy, no asymmetry, masses, or scars and thyroid normal to palpation     RESP: lungs clear to auscultation - no rales, rhonchi or wheezes     CV: regular rates and rhythm, normal S1 S2, no S3 or  S4 and no murmur, click or rub     ABDOMEN:  soft, nontender, no HSM or masses and bowel sounds normal     MS: extremities normal- no gross deformities noted, no evidence of inflammation in joints, FROM in all extremities.     SKIN: no suspicious lesions or rashes     NEURO: Normal strength and tone, sensory exam grossly normal, mentation intact and speech normal     PSYCH: mentation appears normal. and affect normal/bright     LYMPHATICS: No cervical adenopathy    Recent Labs   Lab Test 05/07/21  0947 04/08/21  1435 02/03/20  1420   HGB 11.4* 13.1 13.2    224 247   NA  --  137 136   POTASSIUM  --  4.3 4.0   CR  --  0.85 0.81   A1C 5.1  --   --         Diagnostics:  Labs pending at this time.  Results will be reviewed when available.   No EKG required, no history of coronary heart disease, significant arrhythmia, peripheral arterial disease or other structural heart disease.    Revised Cardiac Risk Index (RCRI):  The patient has the following serious cardiovascular risks for perioperative complications:   - No serious cardiac risks = 0 points     RCRI Interpretation: 0 points: Class I (very low risk - 0.4% complication rate)           Signed Electronically by: Abril Flores MD  Copy of this evaluation report is provided to requesting physician.

## 2021-05-27 NOTE — H&P (VIEW-ONLY)
[facility]  [address] [place] [address]  Phone: [phone]  Fax: [phone]  Primary Provider: Jose Alejandro Harvey  Pre-op Performing Provider: JOSE ALEJANDRO HARVEY      PREOPERATIVE EVALUATION:  Today's date: 5/27/2021    Noe Fitzgerald is a 51 year old female who presents for a preoperative evaluation.    Surgical Information:  Surgery/Procedure: Total laparoscopic hysterectomy bilateral salpingo-oophorectomy  Surgery Location: [facility]  Surgeon: Dr. Hood  Surgery Date: 6/03/2021  Time of Surgery:   Where patient plans to recover: At home with family  Fax number for surgical facility: Note does not need to be faxed, will be available electronically in Epic.    Type of Anesthesia Anticipated: General    Assessment & Plan     The proposed surgical procedure is considered INTERMEDIATE risk.    Preop general physical exam  Ok for surgery    Excessive bleeding in premenopausal period  DUB  Hysterectomy as planned  - CBC with platelets and differential    Hypertension goal BP (blood pressure) < 140/90  Well controlled  - CBC with platelets and differential  - Comprehensive metabolic panel    Complex endometrial hyperplasia  Surgery as planned  - CBC with platelets and differential         Risks and Recommendations:  The patient has the following additional risks and recommendations for perioperative complications:   - No identified additional risk factors other than previously addressed    Medication Instructions:  Patient is to take all scheduled medications on the day of surgery  Except don't take megestrol, stop that    RECOMMENDATION:  APPROVAL GIVEN to proceed with proposed procedure, without further diagnostic evaluation.            Subjective     HPI related to upcoming procedure: had heavy periods, ultrasound showed thickened endometrium, endometrial biopsy showed complex endometrial hyperplasia    Preop Questions 5/27/2021   1. Have you ever had a heart attack or  stroke? No   2. Have you ever had surgery on your heart or blood vessels, such as a stent placement, a coronary artery bypass, or surgery on an artery in your head, neck, heart, or legs? No   3. Do you have chest pain with activity? No   4. Do you have a history of  heart failure? No   5. Do you currently have a cold, bronchitis or symptoms of other infection? No   6. Do you have a cough, shortness of breath, or wheezing? No   7. Do you or anyone in your family have previous history of blood clots? No   8. Do you or does anyone in your family have a serious bleeding problem such as prolonged bleeding following surgeries or cuts? No   9. Have you ever had problems with anemia or been told to take iron pills? No   10. Have you had any abnormal blood loss such as black, tarry or bloody stools, or abnormal vaginal bleeding? YES - heavy periods   11. Have you ever had a blood transfusion? No   12. Are you willing to have a blood transfusion if it is medically needed before, during, or after your surgery? Yes   13. Have you or any of your relatives ever had problems with anesthesia? No   14. Do you have sleep apnea, excessive snoring or daytime drowsiness? No   15. Do you have any artifical heart valves or other implanted medical devices like a pacemaker, defibrillator, or continuous glucose monitor? No   16. Do you have artificial joints? No   17. Are you allergic to latex? No   18. Is there any chance that you may be pregnant? No       Health Care Directive:  Patient does not have a Health Care Directive or Living Will: Advance Directive received and scanned. Click on Code in the patient header to view.    Preoperative Review of :   reviewed - no record of controlled substances prescribed.      Status of Chronic Conditions:  See problem list for active medical problems.  Problems all longstanding and stable, except as noted/documented.  See ROS for pertinent symptoms related to these conditions.    hypertension    On lisinopril, metoprolol  BP Readings from Last 6 Encounters:   05/27/21 122/88   05/14/21 132/87   05/07/21 (!) 134/95   04/08/21 139/89   02/03/20 124/88   06/03/19 (!) 136/91      aortic dilatation  Mildly dilated on Echocardiogram in 2016. Needs repeat    Review of Systems  Constitutional, neuro, ENT, endocrine, pulmonary, cardiac, gastrointestinal, genitourinary, musculoskeletal, integument and psychiatric systems are negative, except as otherwise noted.    Patient Active Problem List    Diagnosis Date Noted     Complex endometrial hyperplasia 05/20/2021     Priority: Medium     Added automatically from request for surgery [id number]       Obesity (BMI 35.0-39.9) with comorbidity (H) 10/25/2018     Priority: Medium     Papanicolaou smear of cervix with atypical squamous cells of undetermined significance (ASC-US) 02/17/2017     Priority: Medium     Ascending aorta dilatation (H) 09/12/2016     Priority: Medium     Per cardiology, put on beta blocker and recheck echocardiogram in one year       ASCUS with positive high risk HPV cervical 12/02/2015     Priority: Medium     11/23/2015:Pap--ASCUS, +HR HPV.   12/08/15 Colpo benign. Plan: co-test in 12 months.  2/17/17:Pap--NIL, neg HPV. Repeat Pap+HPV in 3 yrs. Due 2020  2/3/20 NIL pap (endo cells present), neg HPV. Plan: 5 year cotest       Hypertension goal BP (blood pressure) < 140/90 10/17/2012     Priority: Medium     CARDIOVASCULAR SCREENING; LDL GOAL LESS THAN 160 10/31/2010     Priority: Medium     Large breasts 10/27/2008     Priority: Medium      Past Medical History:   Diagnosis Date     ASCUS with positive high risk HPV 11/01/2015     Essential hypertension      S/P LEEP of cervix 01/01/1991    has been >20 yrs since LEEP, no abnls noted, paps moved to Q3 yrs     Supervision of normal first pregnancy      Vaginitis and vulvovaginitis, unspecified      Past Surgical History:   Procedure Laterality Date     ESOPHAGOSCOPY, GASTROSCOPY, DUODENOSCOPY (EGD),  COMBINED N/A 01/26/2017    (EGD) ESOPHAGOSCOPY, GASTROSCOPY, DUODENOSCOPY (EGD);  Surgeon: Damian Ames MD;  Location: [facility]     FOOT SURGERY      Foot surgery     LEEP TX, CERVICAL  01/01/1991    LEEP TX Cervical     MAMMOPLASTY REDUCTION BILATERAL Bilateral 05/22/2017    Procedure: MAMMOPLASTY REDUCTION BILATERAL;  Bilateral Breast Reduction Mammoplasty;  Surgeon: Franny Reynolds MD;  [facility]     TYMPANOPLASTY CHILD      7th grader     Current Outpatient Medications   Medication Sig Dispense Refill     lisinopril (ZESTRIL) 20 MG tablet Take 1 tablet (20 mg) by mouth daily 90 tablet 1     megestrol (MEGACE) 40 MG tablet Take 2 tablets (80 mg) by mouth daily 30 tablet 0     metoprolol succinate ER (TOPROL-XL) 25 MG 24 hr tablet Take 1 tablet (25 mg) by mouth daily 30 tablet 0       Allergies   Allergen Reactions     Bactrim [Sulfamethoxazole W/Trimethoprim] Rash        Social History     Tobacco Use     Smoking status: Never Smoker     Smokeless tobacco: Never Used     Tobacco comment: Pt experiences second hand smoke at work    Substance Use Topics     Alcohol use: No     Comment: rarely     Family History   Problem Relation Age of Onset     Hypertension Father      Colon Cancer No family hx of      Endometrial Cancer No family hx of      Breast Cancer No family hx of      Ovarian Cancer No family hx of      History   Drug Use No         Objective     /88 (BP Location: Right arm)   Pulse 91   Temp 98.1  F (36.7  C) (Tympanic)   Resp 20   Wt 102.1 kg (225 lb)   LMP 04/03/2021   SpO2 99%   BMI 36.87 kg/m      Physical Exam    GENERAL APPEARANCE: healthy, alert and no distress     EYES: EOMI, PERRL     HENT: ear canals and TM's normal and nose and mouth without ulcers or lesions     NECK: no adenopathy, no asymmetry, masses, or scars and thyroid normal to palpation     RESP: lungs clear to auscultation - no rales, rhonchi or wheezes     CV: regular rates and rhythm, normal S1 S2, no S3 or  S4 and no murmur, click or rub     ABDOMEN:  soft, nontender, no HSM or masses and bowel sounds normal     MS: extremities normal- no gross deformities noted, no evidence of inflammation in joints, FROM in all extremities.     SKIN: no suspicious lesions or rashes     NEURO: Normal strength and tone, sensory exam grossly normal, mentation intact and speech normal     PSYCH: mentation appears normal. and affect normal/bright     LYMPHATICS: No cervical adenopathy    Recent Labs   Lab Test 05/07/21  0947 04/08/21  1435 02/03/20  1420   HGB 11.4* 13.1 13.2    224 247   NA  --  137 136   POTASSIUM  --  4.3 4.0   CR  --  0.85 0.81   A1C 5.1  --   --         Diagnostics:  Labs pending at this time.  Results will be reviewed when available.   No EKG required, no history of coronary heart disease, significant arrhythmia, peripheral arterial disease or other structural heart disease.    Revised Cardiac Risk Index (RCRI):  The patient has the following serious cardiovascular risks for perioperative complications:   - No serious cardiac risks = 0 points     RCRI Interpretation: 0 points: Class I (very low risk - 0.4% complication rate)           Signed Electronically by: Abril Flores MD  Copy of this evaluation report is provided to requesting physician.

## 2021-05-27 NOTE — NURSING NOTE
"Chief Complaint   Patient presents with     Pre-Op Exam       Initial /88 (BP Location: Right arm)   Pulse 91   Temp 98.1  F (36.7  C) (Tympanic)   Resp 20   Wt 102.1 kg (225 lb)   LMP 04/03/2021   SpO2 99%   BMI 36.87 kg/m   Estimated body mass index is 36.87 kg/m  as calculated from the following:    Height as of 5/14/21: 1.664 m (5' 5.5\").    Weight as of this encounter: 102.1 kg (225 lb).    Patient presents to the clinic using No DME    Health Maintenance that is potentially due pending provider review:  NONE    n/a    Is there anyone who you would like to be able to receive your results? No  If yes have patient fill out NATACHA    "

## 2021-06-02 ENCOUNTER — ANESTHESIA EVENT (OUTPATIENT)
Dept: SURGERY | Facility: CLINIC | Age: 51
End: 2021-06-02
Payer: COMMERCIAL

## 2021-06-02 DIAGNOSIS — Z11.59 ENCOUNTER FOR SCREENING FOR OTHER VIRAL DISEASES: ICD-10-CM

## 2021-06-02 LAB
LABORATORY COMMENT REPORT: NORMAL
SARS-COV-2 RNA RESP QL NAA+PROBE: NEGATIVE
SARS-COV-2 RNA RESP QL NAA+PROBE: NORMAL
SPECIMEN SOURCE: NORMAL
SPECIMEN SOURCE: NORMAL

## 2021-06-02 PROCEDURE — U0005 INFEC AGEN DETEC AMPLI PROBE: HCPCS | Performed by: OBSTETRICS & GYNECOLOGY

## 2021-06-02 PROCEDURE — U0003 INFECTIOUS AGENT DETECTION BY NUCLEIC ACID (DNA OR RNA); SEVERE ACUTE RESPIRATORY SYNDROME CORONAVIRUS 2 (SARS-COV-2) (CORONAVIRUS DISEASE [COVID-19]), AMPLIFIED PROBE TECHNIQUE, MAKING USE OF HIGH THROUGHPUT TECHNOLOGIES AS DESCRIBED BY CMS-2020-01-R: HCPCS | Performed by: OBSTETRICS & GYNECOLOGY

## 2021-06-03 ENCOUNTER — ANCILLARY PROCEDURE (OUTPATIENT)
Dept: ULTRASOUND IMAGING | Facility: CLINIC | Age: 51
End: 2021-06-03
Attending: STUDENT IN AN ORGANIZED HEALTH CARE EDUCATION/TRAINING PROGRAM
Payer: COMMERCIAL

## 2021-06-03 ENCOUNTER — ANESTHESIA (OUTPATIENT)
Dept: SURGERY | Facility: CLINIC | Age: 51
End: 2021-06-03
Payer: COMMERCIAL

## 2021-06-03 ENCOUNTER — HOSPITAL ENCOUNTER (OUTPATIENT)
Facility: CLINIC | Age: 51
Discharge: HOME | End: 2021-06-03
Attending: OBSTETRICS & GYNECOLOGY | Admitting: OBSTETRICS & GYNECOLOGY
Payer: COMMERCIAL

## 2021-06-03 VITALS
HEART RATE: 72 BPM | BODY MASS INDEX: 35.75 KG/M2 | WEIGHT: 222.44 LBS | SYSTOLIC BLOOD PRESSURE: 115 MMHG | TEMPERATURE: 97.5 F | DIASTOLIC BLOOD PRESSURE: 73 MMHG | HEIGHT: 66 IN | OXYGEN SATURATION: 93 % | RESPIRATION RATE: 14 BRPM

## 2021-06-03 DIAGNOSIS — N85.01 COMPLEX ENDOMETRIAL HYPERPLASIA: ICD-10-CM

## 2021-06-03 DIAGNOSIS — Z90.710 S/P LAPAROSCOPIC HYSTERECTOMY: Primary | ICD-10-CM

## 2021-06-03 LAB
ABO + RH BLD: NORMAL
ABO + RH BLD: NORMAL
BLD GP AB SCN SERPL QL: NORMAL
BLOOD BANK CMNT PATIENT-IMP: NORMAL
GLUCOSE BLDC GLUCOMTR-MCNC: 93 MG/DL (ref 70–99)
HCG UR QL: NEGATIVE
SPECIMEN EXP DATE BLD: NORMAL

## 2021-06-03 PROCEDURE — 710N000010 HC RECOVERY PHASE 1, LEVEL 2, PER MIN: Performed by: OBSTETRICS & GYNECOLOGY

## 2021-06-03 PROCEDURE — 250N000013 HC RX MED GY IP 250 OP 250 PS 637: Performed by: STUDENT IN AN ORGANIZED HEALTH CARE EDUCATION/TRAINING PROGRAM

## 2021-06-03 PROCEDURE — 81025 URINE PREGNANCY TEST: CPT | Performed by: OBSTETRICS & GYNECOLOGY

## 2021-06-03 PROCEDURE — 88331 PATH CONSLTJ SURG 1 BLK 1SPC: CPT | Mod: TC | Performed by: OBSTETRICS & GYNECOLOGY

## 2021-06-03 PROCEDURE — 86901 BLOOD TYPING SEROLOGIC RH(D): CPT | Performed by: ANESTHESIOLOGY

## 2021-06-03 PROCEDURE — 250N000011 HC RX IP 250 OP 636: Performed by: OBSTETRICS & GYNECOLOGY

## 2021-06-03 PROCEDURE — 710N000012 HC RECOVERY PHASE 2, PER MINUTE: Performed by: OBSTETRICS & GYNECOLOGY

## 2021-06-03 PROCEDURE — 36415 COLL VENOUS BLD VENIPUNCTURE: CPT | Performed by: ANESTHESIOLOGY

## 2021-06-03 PROCEDURE — 250N000011 HC RX IP 250 OP 636: Performed by: ANESTHESIOLOGY

## 2021-06-03 PROCEDURE — 272N000001 HC OR GENERAL SUPPLY STERILE: Performed by: OBSTETRICS & GYNECOLOGY

## 2021-06-03 PROCEDURE — 86900 BLOOD TYPING SEROLOGIC ABO: CPT | Performed by: ANESTHESIOLOGY

## 2021-06-03 PROCEDURE — 88309 TISSUE EXAM BY PATHOLOGIST: CPT | Mod: 26 | Performed by: PATHOLOGY

## 2021-06-03 PROCEDURE — 370N000017 HC ANESTHESIA TECHNICAL FEE, PER MIN: Performed by: OBSTETRICS & GYNECOLOGY

## 2021-06-03 PROCEDURE — 250N000009 HC RX 250: Performed by: ANESTHESIOLOGY

## 2021-06-03 PROCEDURE — 250N000011 HC RX IP 250 OP 636: Performed by: NURSE ANESTHETIST, CERTIFIED REGISTERED

## 2021-06-03 PROCEDURE — 258N000003 HC RX IP 258 OP 636: Performed by: NURSE ANESTHETIST, CERTIFIED REGISTERED

## 2021-06-03 PROCEDURE — 250N000025 HC SEVOFLURANE, PER MIN: Performed by: OBSTETRICS & GYNECOLOGY

## 2021-06-03 PROCEDURE — 250N000011 HC RX IP 250 OP 636: Performed by: STUDENT IN AN ORGANIZED HEALTH CARE EDUCATION/TRAINING PROGRAM

## 2021-06-03 PROCEDURE — 999N000141 HC STATISTIC PRE-PROCEDURE NURSING ASSESSMENT: Performed by: OBSTETRICS & GYNECOLOGY

## 2021-06-03 PROCEDURE — 86850 RBC ANTIBODY SCREEN: CPT | Performed by: ANESTHESIOLOGY

## 2021-06-03 PROCEDURE — 360N000077 HC SURGERY LEVEL 4, PER MIN: Performed by: OBSTETRICS & GYNECOLOGY

## 2021-06-03 PROCEDURE — 88331 PATH CONSLTJ SURG 1 BLK 1SPC: CPT | Mod: 26 | Performed by: PATHOLOGY

## 2021-06-03 PROCEDURE — 250N000009 HC RX 250: Performed by: NURSE ANESTHETIST, CERTIFIED REGISTERED

## 2021-06-03 PROCEDURE — 82962 GLUCOSE BLOOD TEST: CPT

## 2021-06-03 PROCEDURE — 88309 TISSUE EXAM BY PATHOLOGIST: CPT | Mod: TC | Performed by: OBSTETRICS & GYNECOLOGY

## 2021-06-03 RX ORDER — EPHEDRINE SULFATE 50 MG/ML
INJECTION, SOLUTION INTRAMUSCULAR; INTRAVENOUS; SUBCUTANEOUS PRN
Status: DISCONTINUED | OUTPATIENT
Start: 2021-06-03 | End: 2021-06-03

## 2021-06-03 RX ORDER — OXYCODONE HYDROCHLORIDE 5 MG/1
5 TABLET ORAL EVERY 6 HOURS PRN
Qty: 6 TABLET | Refills: 0 | Status: SHIPPED | OUTPATIENT
Start: 2021-06-03 | End: 2022-08-30

## 2021-06-03 RX ORDER — IBUPROFEN 600 MG/1
600 TABLET, FILM COATED ORAL EVERY 6 HOURS
Qty: 12 TABLET | Refills: 0 | Status: SHIPPED | OUTPATIENT
Start: 2021-06-03

## 2021-06-03 RX ORDER — HEPARIN SODIUM 5000 [USP'U]/.5ML
5000 INJECTION, SOLUTION INTRAVENOUS; SUBCUTANEOUS
Status: COMPLETED | OUTPATIENT
Start: 2021-06-03 | End: 2021-06-03

## 2021-06-03 RX ORDER — FENTANYL CITRATE 50 UG/ML
25-50 INJECTION, SOLUTION INTRAMUSCULAR; INTRAVENOUS
Status: DISCONTINUED | OUTPATIENT
Start: 2021-06-03 | End: 2021-06-03 | Stop reason: HOSPADM

## 2021-06-03 RX ORDER — NALOXONE HYDROCHLORIDE 0.4 MG/ML
0.2 INJECTION, SOLUTION INTRAMUSCULAR; INTRAVENOUS; SUBCUTANEOUS
Status: DISCONTINUED | OUTPATIENT
Start: 2021-06-03 | End: 2021-06-03 | Stop reason: HOSPADM

## 2021-06-03 RX ORDER — NALOXONE HYDROCHLORIDE 0.4 MG/ML
0.4 INJECTION, SOLUTION INTRAMUSCULAR; INTRAVENOUS; SUBCUTANEOUS
Status: DISCONTINUED | OUTPATIENT
Start: 2021-06-03 | End: 2021-06-03 | Stop reason: HOSPADM

## 2021-06-03 RX ORDER — DEXAMETHASONE SODIUM PHOSPHATE 4 MG/ML
INJECTION, SOLUTION INTRA-ARTICULAR; INTRALESIONAL; INTRAMUSCULAR; INTRAVENOUS; SOFT TISSUE PRN
Status: DISCONTINUED | OUTPATIENT
Start: 2021-06-03 | End: 2021-06-03

## 2021-06-03 RX ORDER — FENTANYL CITRATE 50 UG/ML
INJECTION, SOLUTION INTRAMUSCULAR; INTRAVENOUS PRN
Status: DISCONTINUED | OUTPATIENT
Start: 2021-06-03 | End: 2021-06-03

## 2021-06-03 RX ORDER — ACETAMINOPHEN 325 MG/1
975 TABLET ORAL ONCE
Status: COMPLETED | OUTPATIENT
Start: 2021-06-03 | End: 2021-06-03

## 2021-06-03 RX ORDER — PROPOFOL 10 MG/ML
INJECTION, EMULSION INTRAVENOUS PRN
Status: DISCONTINUED | OUTPATIENT
Start: 2021-06-03 | End: 2021-06-03

## 2021-06-03 RX ORDER — OXYCODONE HYDROCHLORIDE 5 MG/1
5 TABLET ORAL
Status: DISCONTINUED | OUTPATIENT
Start: 2021-06-03 | End: 2021-06-03 | Stop reason: HOSPADM

## 2021-06-03 RX ORDER — ONDANSETRON 4 MG/1
4-8 TABLET, ORALLY DISINTEGRATING ORAL EVERY 8 HOURS PRN
Qty: 4 TABLET | Refills: 0 | Status: SHIPPED | OUTPATIENT
Start: 2021-06-03 | End: 2022-08-30

## 2021-06-03 RX ORDER — SODIUM CHLORIDE, SODIUM LACTATE, POTASSIUM CHLORIDE, CALCIUM CHLORIDE 600; 310; 30; 20 MG/100ML; MG/100ML; MG/100ML; MG/100ML
INJECTION, SOLUTION INTRAVENOUS CONTINUOUS PRN
Status: DISCONTINUED | OUTPATIENT
Start: 2021-06-03 | End: 2021-06-03

## 2021-06-03 RX ORDER — LIDOCAINE HYDROCHLORIDE 20 MG/ML
INJECTION, SOLUTION INFILTRATION; PERINEURAL PRN
Status: DISCONTINUED | OUTPATIENT
Start: 2021-06-03 | End: 2021-06-03

## 2021-06-03 RX ORDER — AMOXICILLIN 250 MG
1-2 CAPSULE ORAL 2 TIMES DAILY PRN
Qty: 30 TABLET | Refills: 0 | Status: SHIPPED | OUTPATIENT
Start: 2021-06-03 | End: 2022-08-30

## 2021-06-03 RX ORDER — ACETAMINOPHEN 325 MG/1
650 TABLET ORAL EVERY 6 HOURS
Qty: 24 TABLET | Refills: 0 | Status: SHIPPED | OUTPATIENT
Start: 2021-06-03

## 2021-06-03 RX ORDER — DEXAMETHASONE SODIUM PHOSPHATE 10 MG/ML
INJECTION, SOLUTION INTRAMUSCULAR; INTRAVENOUS
Status: COMPLETED | OUTPATIENT
Start: 2021-06-03 | End: 2021-06-03

## 2021-06-03 RX ORDER — GABAPENTIN 300 MG/1
300 CAPSULE ORAL ONCE
Status: COMPLETED | OUTPATIENT
Start: 2021-06-03 | End: 2021-06-03

## 2021-06-03 RX ORDER — ONDANSETRON 2 MG/ML
4 INJECTION INTRAMUSCULAR; INTRAVENOUS EVERY 30 MIN PRN
Status: DISCONTINUED | OUTPATIENT
Start: 2021-06-03 | End: 2021-06-03 | Stop reason: HOSPADM

## 2021-06-03 RX ORDER — CEFAZOLIN SODIUM 2 G/100ML
2 INJECTION, SOLUTION INTRAVENOUS
Status: COMPLETED | OUTPATIENT
Start: 2021-06-03 | End: 2021-06-03

## 2021-06-03 RX ORDER — ONDANSETRON 4 MG/1
4 TABLET, ORALLY DISINTEGRATING ORAL EVERY 30 MIN PRN
Status: DISCONTINUED | OUTPATIENT
Start: 2021-06-03 | End: 2021-06-03 | Stop reason: HOSPADM

## 2021-06-03 RX ORDER — BUPIVACAINE HYDROCHLORIDE 2.5 MG/ML
INJECTION, SOLUTION EPIDURAL; INFILTRATION; INTRACAUDAL
Status: DISCONTINUED | OUTPATIENT
Start: 2021-06-03 | End: 2021-06-03

## 2021-06-03 RX ORDER — IBUPROFEN 200 MG
800 TABLET ORAL ONCE
Status: COMPLETED | OUTPATIENT
Start: 2021-06-03 | End: 2021-06-03

## 2021-06-03 RX ORDER — DEXAMETHASONE SODIUM PHOSPHATE 10 MG/ML
INJECTION, SOLUTION INTRAMUSCULAR; INTRAVENOUS
Status: DISCONTINUED | OUTPATIENT
Start: 2021-06-03 | End: 2021-06-03

## 2021-06-03 RX ORDER — BUPIVACAINE HYDROCHLORIDE AND EPINEPHRINE 2.5; 5 MG/ML; UG/ML
INJECTION, SOLUTION INFILTRATION; PERINEURAL
Status: COMPLETED | OUTPATIENT
Start: 2021-06-03 | End: 2021-06-03

## 2021-06-03 RX ORDER — HYDROMORPHONE HYDROCHLORIDE 1 MG/ML
.3-.5 INJECTION, SOLUTION INTRAMUSCULAR; INTRAVENOUS; SUBCUTANEOUS EVERY 5 MIN PRN
Status: DISCONTINUED | OUTPATIENT
Start: 2021-06-03 | End: 2021-06-03 | Stop reason: HOSPADM

## 2021-06-03 RX ORDER — SODIUM CHLORIDE, SODIUM LACTATE, POTASSIUM CHLORIDE, CALCIUM CHLORIDE 600; 310; 30; 20 MG/100ML; MG/100ML; MG/100ML; MG/100ML
INJECTION, SOLUTION INTRAVENOUS CONTINUOUS
Status: DISCONTINUED | OUTPATIENT
Start: 2021-06-03 | End: 2021-06-03 | Stop reason: HOSPADM

## 2021-06-03 RX ORDER — FAMOTIDINE 20 MG/1
20 TABLET, FILM COATED ORAL
COMMUNITY

## 2021-06-03 RX ORDER — CEFAZOLIN SODIUM 2 G/100ML
2 INJECTION, SOLUTION INTRAVENOUS SEE ADMIN INSTRUCTIONS
Status: DISCONTINUED | OUTPATIENT
Start: 2021-06-03 | End: 2021-06-03 | Stop reason: HOSPADM

## 2021-06-03 RX ORDER — BUPIVACAINE HYDROCHLORIDE AND EPINEPHRINE 2.5; 5 MG/ML; UG/ML
INJECTION, SOLUTION INFILTRATION; PERINEURAL
Status: DISCONTINUED | OUTPATIENT
Start: 2021-06-03 | End: 2021-06-03

## 2021-06-03 RX ORDER — FLUMAZENIL 0.1 MG/ML
0.2 INJECTION, SOLUTION INTRAVENOUS
Status: DISCONTINUED | OUTPATIENT
Start: 2021-06-03 | End: 2021-06-03 | Stop reason: HOSPADM

## 2021-06-03 RX ORDER — ACETAMINOPHEN 325 MG/1
975 TABLET ORAL ONCE
Status: DISCONTINUED | OUTPATIENT
Start: 2021-06-03 | End: 2021-06-03 | Stop reason: HOSPADM

## 2021-06-03 RX ADMIN — FENTANYL CITRATE 100 MCG: 50 INJECTION, SOLUTION INTRAMUSCULAR; INTRAVENOUS at 08:31

## 2021-06-03 RX ADMIN — FAMOTIDINE 20 MG: 20 INJECTION, SOLUTION INTRAVENOUS at 11:00

## 2021-06-03 RX ADMIN — FENTANYL CITRATE 50 MCG: 50 INJECTION, SOLUTION INTRAMUSCULAR; INTRAVENOUS at 09:38

## 2021-06-03 RX ADMIN — PHENYLEPHRINE HYDROCHLORIDE 100 MCG: 10 INJECTION INTRAVENOUS at 09:50

## 2021-06-03 RX ADMIN — LIDOCAINE HYDROCHLORIDE 100 MG: 20 INJECTION, SOLUTION INFILTRATION; PERINEURAL at 07:35

## 2021-06-03 RX ADMIN — FENTANYL CITRATE 100 MCG: 50 INJECTION, SOLUTION INTRAMUSCULAR; INTRAVENOUS at 07:35

## 2021-06-03 RX ADMIN — FENTANYL CITRATE 50 MCG: 50 INJECTION, SOLUTION INTRAMUSCULAR; INTRAVENOUS at 06:55

## 2021-06-03 RX ADMIN — ACETAMINOPHEN 975 MG: 325 TABLET, FILM COATED ORAL at 05:54

## 2021-06-03 RX ADMIN — Medication 5 MG: at 09:02

## 2021-06-03 RX ADMIN — MIDAZOLAM 2 MG: 1 INJECTION INTRAMUSCULAR; INTRAVENOUS at 07:25

## 2021-06-03 RX ADMIN — ROCURONIUM BROMIDE 20 MG: 10 INJECTION INTRAVENOUS at 09:30

## 2021-06-03 RX ADMIN — HEPARIN SODIUM 5000 UNITS: 5000 INJECTION, SOLUTION INTRAVENOUS; SUBCUTANEOUS at 07:04

## 2021-06-03 RX ADMIN — SODIUM CHLORIDE, POTASSIUM CHLORIDE, SODIUM LACTATE AND CALCIUM CHLORIDE: 600; 310; 30; 20 INJECTION, SOLUTION INTRAVENOUS at 07:56

## 2021-06-03 RX ADMIN — PHENYLEPHRINE HYDROCHLORIDE 100 MCG: 10 INJECTION INTRAVENOUS at 07:35

## 2021-06-03 RX ADMIN — SODIUM CHLORIDE, POTASSIUM CHLORIDE, SODIUM LACTATE AND CALCIUM CHLORIDE: 600; 310; 30; 20 INJECTION, SOLUTION INTRAVENOUS at 07:31

## 2021-06-03 RX ADMIN — DEXAMETHASONE SODIUM PHOSPHATE 8 MG: 4 INJECTION, SOLUTION INTRA-ARTICULAR; INTRALESIONAL; INTRAMUSCULAR; INTRAVENOUS; SOFT TISSUE at 07:35

## 2021-06-03 RX ADMIN — DEXMEDETOMIDINE 40 MCG: 100 INJECTION, SOLUTION, CONCENTRATE INTRAVENOUS at 07:01

## 2021-06-03 RX ADMIN — ROCURONIUM BROMIDE 100 MG: 10 INJECTION INTRAVENOUS at 07:35

## 2021-06-03 RX ADMIN — SUGAMMADEX 300 MG: 100 INJECTION, SOLUTION INTRAVENOUS at 10:05

## 2021-06-03 RX ADMIN — FENTANYL CITRATE 50 MCG: 50 INJECTION, SOLUTION INTRAMUSCULAR; INTRAVENOUS at 11:20

## 2021-06-03 RX ADMIN — BUPIVACAINE HYDROCHLORIDE AND EPINEPHRINE BITARTRATE 60 ML: 2.5; .005 INJECTION, SOLUTION INFILTRATION; PERINEURAL at 07:01

## 2021-06-03 RX ADMIN — DEXAMETHASONE SODIUM PHOSPHATE 2 MG: 10 INJECTION, SOLUTION INTRAMUSCULAR; INTRAVENOUS at 07:01

## 2021-06-03 RX ADMIN — IBUPROFEN 800 MG: 200 TABLET, FILM COATED ORAL at 12:13

## 2021-06-03 RX ADMIN — GABAPENTIN 300 MG: 300 CAPSULE ORAL at 05:55

## 2021-06-03 RX ADMIN — MIDAZOLAM 1 MG: 1 INJECTION INTRAMUSCULAR; INTRAVENOUS at 06:54

## 2021-06-03 RX ADMIN — FENTANYL CITRATE 50 MCG: 50 INJECTION, SOLUTION INTRAMUSCULAR; INTRAVENOUS at 10:45

## 2021-06-03 RX ADMIN — Medication 5 MG: at 09:44

## 2021-06-03 RX ADMIN — PROPOFOL 150 MG: 10 INJECTION, EMULSION INTRAVENOUS at 07:35

## 2021-06-03 RX ADMIN — CEFAZOLIN 2 G: 10 INJECTION, POWDER, FOR SOLUTION INTRAVENOUS at 08:00

## 2021-06-03 RX ADMIN — Medication 5 MG: at 09:04

## 2021-06-03 RX ADMIN — HYDROMORPHONE HYDROCHLORIDE 0.5 MG: 1 INJECTION, SOLUTION INTRAMUSCULAR; INTRAVENOUS; SUBCUTANEOUS at 10:30

## 2021-06-03 ASSESSMENT — MIFFLIN-ST. JEOR: SCORE: 1635.75

## 2021-06-03 NOTE — OR NURSING
Dr Santiago verbalized sign out from [facility].  Patient's heartburn discomfort on arrival to [facility] resolved w 1 dose iv pepcid per Dr Santiago.     Please see flowsheets, MAR, and results review for further details

## 2021-06-03 NOTE — PROGRESS NOTES
Gynecologic Oncology Postoperative Check Note  6/3/2021    S: Patient reports she is doing well postoperatively. Initially she was having some reflux and cramping menstrual-like pain but this has resolved. Pain is well controlled with oral pain medications. Ambulating without pain. Voiding spontaneously. Tolerating crackers and water without nausea or vomiting. Denies chest pain, shortness of breath, dizziness, or other concerns at this time.      O:  Vitals:    06/03/21 1230 06/03/21 1245 06/03/21 1300 06/03/21 1315   BP: 113/68  115/73    BP Location:       Cuff Size:   Adult Large    Pulse: 77  72    Resp: 16  14    Temp:       TempSrc:       SpO2: 97% 92% 98% 93%   Weight:       Height:             Gen: alert and oriented, resting in recliner chair, no acute distress  Cardio: rrr, nl s1 and s2, no m/r/g  Resp: lungs CTAB, no wheezes or crackles  Abdomen: soft, appropriately tender to palpation, no rebound  Incision: steri-strips and bandages in place, minimal drainage on bandages, no surrounding erythema   Extremities: BLEs non-tender      I/O last 3 completed shifts:  In: 1420 [P.O.:520; I.V.:900]  Out: 750 [Urine:750]    A: 51 year old POD#0 s/p TLH-BSO. Doing well post-operatively.    Dz: Simple and complex endometrial hyperplasia  FEN: ADAT  Pain: s/p TAP, scheduled tylenol/ibuprofen. PRN oxycodone.   Heme: Initial Hgb 10.7>EBL 25mL> VSS, no concern for ongoing bleeding.   CV: H/o HTN, PTA metoprolol, lisinopril  Pulm: NI. Encourage IS use.   GI: H/o GERD, PTA pepcid. PRN bowel regimen/antiemetics.   : s/p guardado, spontaneously voiding  ID: NI. S/p Ancef, afebrile  Endo: NI  Psych/Neuro: NI  PPX: SCDs  Dispo: Discharge to home now.    Sindi Diaz, MS4      I was present with the medical student who participated in the service and in the documentation of this note.  I have verified the history and personally performed the physical exam and medical decision making, and have verified the content of the note,  which accurately reflect my assessment of the patient and the plan of care.      Mariann Kauffman MD  OB/GYN PGY-4  6/3/2021 3:11 PM  Gyn Onc pgr [phone]

## 2021-06-03 NOTE — ANESTHESIA PROCEDURE NOTES
TAP Procedure Note  Pre-Procedure   Staff -        Anesthesiologist:  Roxanne Berman MD       Resident/Fellow: Prasanth Spears MD       Performed By: resident       Location: pre-op       Procedure Start/Stop Times: 6/3/2021 6:55 AM and 6/3/2021 7:01 AM       Pre-Anesthestic Checklist: patient identified, IV checked, site marked, risks and benefits discussed, informed consent, monitors and equipment checked, pre-op evaluation, at physician/surgeon's request and post-op pain management  Timeout:       Correct Patient: Yes        Correct Procedure: Yes        Correct Site: Yes        Correct Position: Yes        Correct Laterality: Yes        Site Marked: Yes  Procedure Documentation  Procedure: TAP       Diagnosis: POST OPERATIVE PAIN       Laterality: bilateral       Patient Position: supine       Patient Prep/Sterile Barriers: sterile gloves, mask       Skin prep: Chloraprep       Needle Type: short bevel       Needle Gauge: 21.        Needle Length (millimeters): 110        Ultrasound guided       1. Ultrasound was used to identify targeted nerve, plexus, vascular marker, or fascial plane and place a needle adjacent to it in real-time.       2. Ultrasound was used to visualize the spread of anesthetic in close proximity to the above referenced structure.       3. A permanent image is entered into the patient's record.    Assessment/Narrative         The placement was negative for: blood aspirated, painful injection and site bleeding       Paresthesias: No.     Bolus given via needle..        Secured via.        Insertion/Infusion Method: Single Shot       Complications: none       Injection made incrementally with aspirations every 5 mL.    Medication(s) Administered   Bupivacaine 0.25% w/ 1:200K Epi (Injection), 60 mL  Dexmedetomidine 4 mcg/mL (Perineural), 40 mcg  Dexamethasone 10 mg/mL PF (Perineural), 2 mg  Medication Administration Time: 6/3/2021 7:01 AM    Comments:  Discussed risks of nerve block,  including nerve injury, bleeding, infection, incomplete analgesia.  Informed consent was obtained.   Patient tolerated well. Incremental aspiration every 5 mL. No paresthesia, no heme. Needle tip visualized throughout with appropriate spread of local anesthetic fascial plane.  Block was placed at the surgeon's request for post operative pain control.

## 2021-06-03 NOTE — OP NOTE
Operative Note     Pre-operative diagnosis: Complex endometrial hyperplasia [N85.01]; abnormal uterine bleeding, BMI 36    Post-operative diagnosis: Same    Procedure(s):  Total laparoscopic hysterectomy bilateral salpingo-oophorectomy    Surgeon(s) and Role:     * Robert Lyons MD - Primary     * Mariann Kauffman MD - Resident - Assisting    Anesthesia:   Combined General with Block     IVF:  900mL    UOP: 750mL    EBL: 25mL    Drains: None, guardado during case    Specimen(s):  ID Type Source Tests Collected by Time Destination   A : uterus, cervix, bilateral fallopian tubes and bilateral overies Tissue Uterus, Cervix and Bilateral Fallopian Tubes SURGICAL PATHOLOGY EXAM Robert Lyons MD 6/3/2021  9:32 AM        Findings: Bimanual with slightly enlarged, mobile uterus, normal cervix. Smooth nodule on anterior vagina which is not concerning. Intra-abdominal survey with normal upper abdomen. Normal appendix. Ovaries, tubes, and uterus normal in appearance. Left uterine arising distally from the distal superior vesical artery. Otherwise normal intra-operative findings. Endometrium frozen section negative for malignancy.   Indication:  Noe Fitzgerald is a 51 year old pre-menopausal female with AUB-HMB, ultrasound showing 3cm mass and thickened endometrium, biopsy was complex hyperplasia.     Description of Procedure:  Patient was taken to the operating room where identity and procedure were confirmed. General anesthesia was induced. Surgical time out was performed. She was then prepped and draped in the usual sterile fashion. A guardado catheter was placed on the sterile field. A speculum was placed. The V-care uterine manipulator was then placed into the uterus in the usual fashion, and the speculum was removed.     After changing gloves, attention was turned to the abdomen where an intra-umbilical incision was made. The Veress needle was inserted into the abdomen and intra-abdominal placement was confirmed with a  low insertion pressure of 5mmHg. The abdomen was insufflated with CO2 to an operating pressure of 15mmHg. A 5mm clear view port was inserted into the abdomen under direct visualization with the laparoscope. A survey of the abdomen revealed the findings noted above. The patient was placed in Trendelenburg and a survey of the pelvis was performed. Attention was turned to placement of the accessory ports. A 5mm incision was made in the right lower quadrant approximately 2cm superior and medial to the anterior superior iliac spine and a 5mm blunt port was inserted under direct visualization. The same procedure was carried out in the left lower quadrant. A 4th port was placed between the RLQ and umbilical port. All ports were 5 mm ports.     The round ligament was grasped, cauterized, and transected with the Ligasure. The retroperitoneum was opened parallel to the gonadal vessels. The external iliac artery was followed cephalad and the ureter was identified. A defect was made in the peritoneal window superior to the ureter and the ovarian vessels were cauterized and transected using the Ligasure. The adnexa was freed from its peritoneal attachments using the Ligasure to the level of the uterine cornua. The posterior broad ligament was further transected to begin skeletonizing the uterine artery. The anterior broad ligament was transected toward the uterus and the uterovesical peritoneum was incised. A combination of cautery and blunt dissection was used to create a bladder flap. The uterine artery was skeletonized. The same procedure was carried out on the contralateral side. The uterine arteries were then cauterized, and transected using the Ligasure bilaterally. Small straight bites were taken with the Ligasure on both sides in order to lateralize the uterine artery. Due to bleeding from the left uterine pedicle, the left ureter was identified and dissected to the origin of the uterine artery, which was more distal  than usual with the uterine arter arising from the distal superior vesical artery. The ureter was retracted medially and the uterine artery was sealed with the Ligasure at its origin. The monopolar cautery hook was then used to make the colpotomy, which was carried along the V-Care cup to free the specimen. The specimen containing the cervix, uterus, bilateral fallopian tubes, and ovaries was then removed through the vagina, V-Care was confirmed intact, and the specimen was sent to pathology for frozen pathology. A balloon was placed in the vagina to retain pneumoperitoneum.    The vaginal cuff was then closed laparoscopically using running sutures of V-lock. The cuff was irrigated and found to be hemostatic. All pedicles were inspected and also found to be hemostatic. The balloon in the vagina was removed and the cuff palpated to confirm an intact closure. Frozen section returned benign therefore all ports were removed after allowing the pneumoperitoneum to escape. The port sites were closed using 4-0 Monocryl and a steri-strips followed by sterile dressing was placed. All counts were correct prior to concluding the case. The patient was awakened and extubated. She was taken to PACU in stable condition. She tolerated the procedure well without apparent complication.    Robert Lyons MD     Gynecologic Oncology

## 2021-06-03 NOTE — ANESTHESIA POSTPROCEDURE EVALUATION
Patient: Noe Fitzgerald    Procedure(s):  Total laparoscopic hysterectomy bilateral salpingo-oophorectomy    Diagnosis:Complex endometrial hyperplasia [N85.01]  Diagnosis Additional Information: No value filed.    Anesthesia Type:  No value filed.    Note:  Disposition: Outpatient   Postop Pain Control: Uneventful            Sign Out: Well controlled pain   PONV: No   Neuro/Psych: Uneventful            Sign Out: Acceptable/Baseline neuro status   Airway/Respiratory: Uneventful            Sign Out: Acceptable/Baseline resp. status   CV/Hemodynamics: Uneventful            Sign Out: Acceptable CV status; No obvious hypovolemia; No obvious fluid overload   Other NRE: NONE   DID A NON-ROUTINE EVENT OCCUR? No           Last vitals:  Vitals:    06/03/21 1157 06/03/21 1215 06/03/21 1230   BP: 120/81 116/81 113/68   Pulse: 81 79 77   Resp: 16 16 16   Temp: 36.4  C (97.5  F)     SpO2: 100% 97% 97%       Last vitals prior to Anesthesia Care Transfer:  CRNA VITALS  6/3/2021 1000 - 6/3/2021 1100      6/3/2021             Resp Rate (observed):  (!) 1          Electronically Signed By: Cortney Santiago MD  Gemma 3, 2021  1:06 PM

## 2021-06-03 NOTE — BRIEF OP NOTE
[facility]    Brief Operative Note    Pre-operative diagnosis: Complex endometrial hyperplasia [N85.01], abnormal uterine bleeding, Body mass index is 35.9 kg/m .   Post-operative diagnosis Same as pre-operative diagnosis with no evidence of cancer on frozen section    Procedure: Procedure(s):  Total laparoscopic hysterectomy bilateral salpingo-oophorectomy  Surgeon: Surgeon(s) and Role:     * Robert Lyons MD - Primary     * Mariann Kauffman MD - Resident - Assisting  Anesthesia: Combined General with Block   Estimated blood loss: 25  UOP: 750mL   IVF: 900mL crystalloid  Drains:  guardado during case  Specimens:   ID Type Source Tests Collected by Time Destination   A : uterus, cervix, bilateral fallopian tubes and bilateral overies Tissue Uterus, Cervix and Bilateral Fallopian Tubes SURGICAL PATHOLOGY EXAM Robert Lyons MD 6/3/2021  9:32 AM      Findings:   Bimanual with slightly enlarged, mobile uterus, normal cervix. Smooth nodule on anterior vagina which is not concerning. Intra-abdominal survey with normal upper abdomen. Normal appendix. Ovaries, tubes, and uterus normal in appearance. Left uterine arising distally from the distal superior vesical artery. Otherwise normal intra-operative findings. Endometrium frozen section negative for malignancy..  Complications: None.  Implants: * No implants in log *

## 2021-06-03 NOTE — ANESTHESIA CARE TRANSFER NOTE
Patient: Noe Fitzgerald    Procedure(s):  Total laparoscopic hysterectomy bilateral salpingo-oophorectomy    Diagnosis: Complex endometrial hyperplasia [N85.01]  Diagnosis Additional Information: No value filed.    Anesthesia Type:   No value filed.     Note:    Oropharynx: oropharynx clear of all foreign objects  Level of Consciousness: awake  Oxygen Supplementation: face mask  Level of Supplemental Oxygen (L/min / FiO2): 8  Independent Airway: airway patency satisfactory and stable  Dentition: dentition unchanged  Vital Signs Stable: post-procedure vital signs reviewed and stable  Report to RN Given: handoff report given  Patient transferred to: PACU    Handoff Report: Identifed the Patient, Identified the Reponsible Provider, Reviewed the pertinent medical history, Discussed the surgical course, Reviewed Intra-OP anesthesia mangement and issues during anesthesia, Set expectations for post-procedure period and Allowed opportunity for questions and acknowledgement of understanding      Vitals: (Last set prior to Anesthesia Care Transfer)  CRNA VITALS  6/3/2021 1000 - 6/3/2021 1042      6/3/2021             Resp Rate (observed):  (!) 1        Electronically Signed By: GEETHA Harrington CRNA  Gemma 3, 2021  10:42 AM

## 2021-06-03 NOTE — ANESTHESIA PROCEDURE NOTES
TAP Procedure Note  Pre-Procedure   Staff -        Anesthesiologist:  Roxanne Berman MD       Resident/Fellow: Prasanth Spears MD       Performed By: resident       Location: pre-op       Procedure Start/Stop Times: 6/3/2021 6:52 AM and 6/3/2021 7:02 AM       Pre-Anesthestic Checklist: patient identified, IV checked, site marked, risks and benefits discussed, informed consent, monitors and equipment checked, pre-op evaluation, at physician/surgeon's request and post-op pain management  Timeout:       Correct Patient: Yes        Correct Procedure: Yes        Correct Site: Yes        Correct Position: Yes        Correct Laterality: Yes        Site Marked: Yes  Procedure Documentation  Procedure: TAP       Diagnosis: POST OPERATIVE PAIN       Laterality: bilateral       Patient Position: supine       Patient Prep/Sterile Barriers: sterile gloves, mask       Skin prep: Chloraprep       Needle Type: short bevel       Needle Gauge: 21.        Needle Length (millimeters): 110        Ultrasound guided       1. Ultrasound was used to identify targeted nerve, plexus, vascular marker, or fascial plane and place a needle adjacent to it in real-time.       2. Ultrasound was used to visualize the spread of anesthetic in close proximity to the above referenced structure.       3. A permanent image is entered into the patient's record.    Assessment/Narrative         The placement was negative for: blood aspirated, painful injection and site bleeding       Paresthesias: No.     Bolus given via needle..        Secured via.        Insertion/Infusion Method: Single Shot       Complications: none       Injection made incrementally with aspirations every 5 mL.    Medication(s) Administered   Bupivacaine 0.25% w/ 1:200K Epi (Injection), 20 mL  Bupivacaine 0.25% PF (Infiltration), 20 mL  Dexamethasone 10 mg/mL PF (Perineural), 2 mg

## 2021-06-03 NOTE — ANESTHESIA PROCEDURE NOTES
Airway       Patient location during procedure: OR  Staff -        Performed By: CRNA  Consent for Airway        Urgency: elective  Indications and Patient Condition       Indications for airway management: manuel-procedural       Induction type:intravenous       Mask difficulty assessment: 2 - vent by mask + OA or adjuvant +/- NMBA    Final Airway Details       Final airway type: endotracheal airway       Successful airway: ETT - single  Endotracheal Airway Details        ETT size (mm): 7.0       Cuffed: yes       Cuff volume (mL): 8       Successful intubation technique: direct laryngoscopy       DL Blade Type: Verma 2       Grade View of Cords: 1       Adjucts: stylet       Position: Right       Measured from: gums/teeth       Secured at (cm): 22       Bite block used: None    Post intubation assessment        Placement verified by: capnometry, equal breath sounds and chest rise        Number of attempts at approach: 1       Secured with: plastic tape       Ease of procedure: easy       Dentition: Intact and Unchanged    Medication(s) Administered   Medication Administration Time: 6/3/2021 7:40 AM

## 2021-06-03 NOTE — DISCHARGE INSTRUCTIONS
[facility], [place] // Same-Day Surgery // Adult Discharge Orders & Instructions     Take it easy when you get home.  Remember, same day surgery DOES NOT MEAN SAME DAY RECOVERY! Healing is a gradual process.   You will need some time to recover- you may be more tired than you realize at first.  Rest and relax for at least the first 24 hours at home.  You'll feel better and heal faster if you take good care of yourself.     ANESTHESIA RECOVERY -   For 24 hours after surgery    1. Get plenty of rest.  A responsible adult must stay with you for at least 24 hours after you leave the hospital.   2. Do not drive or use heavy equipment.  If you have weakness or tingling, don't drive or use heavy equipment until this feeling goes away.  3. Do not drink alcohol.  4. Avoid strenuous or risky activities.  Ask for help when climbing stairs.   5. You may feel lightheaded.  IF so, sit for a few minutes before standing.  Have someone help you get up.   6. If you have nausea (feel sick to your stomach): Drink only clear liquids such as apple juice, ginger ale, broth or 7-Up.  Rest may also help.  Be sure to drink enough fluids.  Move to a regular diet as you feel able.  7. You may have a slight fever. Call the doctor if your fever is over 100 F (37.7 C) (taken under the tongue) or lasts longer than 24 hours.  8. You may have a dry mouth, a sore throat, muscle aches or trouble sleeping.  These should go away after 24 hours.  9. Do not make important or legal decisions.     Call your doctor for any of the followin.  Signs of infection (fever, growing tenderness at the surgery site, a large amount of drainage or bleeding, severe pain, foul-smelling drainage, redness, swelling).  2. It has been over 8 to 10 hours since surgery and you are still not able to urinate (pass water).  3.  Headache for over 24 hours.    To contact a doctor, call:    Dr Lyons's clinic at [phone] and ask  for the resident on call for Gynecology/Oncology (answered 24 hours a day)    Emergency Department: [facility]: [phone] (TTY for hearing impaired: [phone])

## 2021-06-03 NOTE — ANESTHESIA PREPROCEDURE EVALUATION
Anesthesia Pre-Procedure Evaluation    Patient: Noe Fitzgerald   MRN: [id number] : 1970        Preoperative Diagnosis: Complex endometrial hyperplasia [N85.01]   Procedure : Procedure(s):  Total laparoscopic hysterectomy bilateral salpingo-oophorectomy     Past Medical History:   Diagnosis Date     ASCUS with positive high risk HPV 2015     Essential hypertension      S/P LEEP of cervix 1991    has been >20 yrs since LEEP, no abnls noted, paps moved to Q3 yrs     Supervision of normal first pregnancy      Vaginitis and vulvovaginitis, unspecified       Past Surgical History:   Procedure Laterality Date     ESOPHAGOSCOPY, GASTROSCOPY, DUODENOSCOPY (EGD), COMBINED N/A 2017    (EGD) ESOPHAGOSCOPY, GASTROSCOPY, DUODENOSCOPY (EGD);  Surgeon: Damian Ames MD;  Location: [facility]     FOOT SURGERY      Foot surgery     LEEP TX, CERVICAL  1991    LEEP TX Cervical     MAMMOPLASTY REDUCTION BILATERAL Bilateral 2017    Procedure: MAMMOPLASTY REDUCTION BILATERAL;  Bilateral Breast Reduction Mammoplasty;  Surgeon: Franny Reynolds MD;  Location: [facility]     TYMPANOPLASTY CHILD      7th grader      Allergies   Allergen Reactions     Bactrim [Sulfamethoxazole W/Trimethoprim] Rash      Social History     Tobacco Use     Smoking status: Never Smoker     Smokeless tobacco: Never Used     Tobacco comment: Pt experiences second hand smoke at work    Substance Use Topics     Alcohol use: No     Comment: rarely      Wt Readings from Last 1 Encounters:   21 100.9 kg (222 lb 7.1 oz)        Anesthesia Evaluation            ROS/MED HX  ENT/Pulmonary:       Neurologic:       Cardiovascular:     (+) hypertension-----    METS/Exercise Tolerance:     Hematologic:       Musculoskeletal:       GI/Hepatic:       Renal/Genitourinary:       Endo:     (+) Obesity,     Psychiatric/Substance Use:       Infectious Disease:       Malignancy:       Other:            Physical Exam    Airway        Mallampati:  II       Respiratory Devices and Support         Dental  no notable dental history         Cardiovascular   cardiovascular exam normal          Pulmonary   pulmonary exam normal                OUTSIDE LABS:  CBC:   Lab Results   Component Value Date    WBC 6.8 05/27/2021    WBC 5.8 05/07/2021    HGB 10.7 (L) 05/27/2021    HGB 11.4 (L) 05/07/2021    HCT 33.3 (L) 05/27/2021    HCT 36.0 05/07/2021     05/27/2021     05/07/2021     BMP:   Lab Results   Component Value Date     05/27/2021     04/08/2021    POTASSIUM 4.4 05/27/2021    POTASSIUM 4.3 04/08/2021    CHLORIDE 112 (H) 05/27/2021    CHLORIDE 106 04/08/2021    CO2 25 05/27/2021    CO2 28 04/08/2021    BUN 16 05/27/2021    BUN 12 04/08/2021    CR 0.91 05/27/2021    CR 0.85 04/08/2021     (H) 05/27/2021    GLC 85 04/08/2021     COAGS: No results found for: PTT, INR, FIBR  POC:   Lab Results   Component Value Date    BGM 93 06/03/2021    HCG Negative 06/03/2021    HCGS Negative 06/03/2019     HEPATIC:   Lab Results   Component Value Date    ALBUMIN 3.4 05/27/2021    PROTTOTAL 7.2 05/27/2021    ALT 20 05/27/2021    AST 9 05/27/2021    ALKPHOS 55 05/27/2021    BILITOTAL 0.3 05/27/2021     OTHER:   Lab Results   Component Value Date    A1C 5.1 05/07/2021    BRIT 8.7 05/27/2021    LIPASE 66 (L) 06/03/2019    TSH 2.44 05/07/2021    T4 0.80 07/14/2004       Anesthesia Plan    ASA Status:  2   NPO Status:  NPO Appropriate    Anesthesia Type: General.     - Airway: ETT   Induction: Intravenous.   Maintenance: Balanced.   Techniques and Equipment:     - Lines/Monitors: 2nd IV     Consents    Anesthesia Plan(s) and associated risks, benefits, and realistic alternatives discussed. Questions answered and patient/representative(s) expressed understanding.     - Discussed with:  Patient      - Extended Intubation/Ventilatory Support Discussed: No.      - Patient is DNR/DNI Status: No    Use of blood products discussed: Yes.     - Discussed with:  Patient.     Postoperative Care    Pain management: IV analgesics, Peripheral nerve block (Continuous).   PONV prophylaxis: Ondansetron (or other 5HT-3)     Comments:           Anesthesia Attending    HPI: Noe Fitzgerald is a 51 year old female who  has a past medical history of ASCUS with positive high risk HPV (11/01/2015), Essential hypertension, S/P LEEP of cervix (01/01/1991), Supervision of normal first pregnancy, and Vaginitis and vulvovaginitis, unspecified.  has a past surgical history that includes Tympanoplasty child; Foot surgery; leep tx, cervical (01/01/1991); Esophagoscopy, gastroscopy, duodenoscopy (EGD), combined (N/A, 01/26/2017); and Mammoplasty reduction bilateral (Bilateral, 05/22/2017). with a Complex endometrial hyperplasia [N85.01] scheduled for Procedure(s):  Total laparoscopic hysterectomy bilateral salpingo-oophorectomy.    Meds:   Medications Prior to Admission   Medication Sig Dispense Refill Last Dose     famotidine (PEPCID) 20 MG tablet Take 20 mg by mouth nightly as needed (takes in morning)   6/3/2021 at 0345     lisinopril (ZESTRIL) 20 MG tablet Take 1 tablet (20 mg) by mouth daily 90 tablet 3 6/3/2021 at 0345     megestrol (MEGACE) 40 MG tablet Take 2 tablets (80 mg) by mouth daily 30 tablet 0 Past Week at Unknown time     metoprolol succinate ER (TOPROL-XL) 25 MG 24 hr tablet Take 1 tablet (25 mg) by mouth daily 90 tablet 3 6/3/2021 at 0345       Current Outpatient Medications   Medication Sig Dispense Refill     acetaminophen (TYLENOL) 325 MG tablet Take 2 tablets (650 mg) by mouth every 6 hours 24 tablet 0     ibuprofen (ADVIL/MOTRIN) 600 MG tablet Take 1 tablet (600 mg) by mouth every 6 hours 12 tablet 0     ondansetron (ZOFRAN-ODT) 4 MG ODT tab Take 1-2 tablets (4-8 mg) by mouth every 8 hours as needed for nausea 4 tablet 0     senna-docusate (SENOKOT-S/PERICOLACE) 8.6-50 MG tablet Take 1-2 tablets by mouth 2 times daily as needed for constipation (While on oral opioids.)  30 tablet 0     Vital Signs:  T 98.1  P 74  /78  R 16  SpO2 98%    NPO status adequate.    51 year old female who  has a past medical history of ASCUS with positive high risk HPV (11/01/2015), Essential hypertension, S/P LEEP of cervix (01/01/1991), Supervision of normal first pregnancy, and Vaginitis and vulvovaginitis, unspecified. to OR for Procedure(s):  Total laparoscopic hysterectomy bilateral salpingo-oophorectomy    Plan for GA, standard monitors, large bore IVs, PONV prophylaxis, antibiotics. Plan discussed with the anesthesia and surgery teams.  Consent in chart.         Cortney Santiago MD

## 2021-06-03 NOTE — OR NURSING
Patient up in chair. No urge to urinate thus far into post-operative phase. Updated spouse/. Incisions CDI. Patient taking in good PO intake. Mild cramping present in abdomen. Acceptable. Eager to discharge.

## 2021-06-07 LAB — COPATH REPORT: NORMAL

## 2021-06-08 ENCOUNTER — TELEPHONE (OUTPATIENT)
Dept: SURGERY | Facility: AMBULATORY SURGERY CENTER | Age: 51
End: 2021-06-08

## 2021-06-08 NOTE — TELEPHONE ENCOUNTER
Called patient to relay pathology results showing atypical hyperplasia, no cancer. No further treatment needed.    Robert Lyons MD    Gynecologic Oncology

## 2021-06-16 ENCOUNTER — ONCOLOGY VISIT (OUTPATIENT)
Dept: ONCOLOGY | Facility: CLINIC | Age: 51
End: 2021-06-16
Attending: OBSTETRICS & GYNECOLOGY
Payer: COMMERCIAL

## 2021-06-16 VITALS
RESPIRATION RATE: 16 BRPM | DIASTOLIC BLOOD PRESSURE: 85 MMHG | BODY MASS INDEX: 35.69 KG/M2 | OXYGEN SATURATION: 98 % | HEART RATE: 86 BPM | WEIGHT: 222.1 LBS | HEIGHT: 66 IN | TEMPERATURE: 98.4 F | SYSTOLIC BLOOD PRESSURE: 132 MMHG

## 2021-06-16 DIAGNOSIS — N85.01 COMPLEX ENDOMETRIAL HYPERPLASIA: Primary | ICD-10-CM

## 2021-06-16 PROCEDURE — 99024 POSTOP FOLLOW-UP VISIT: CPT | Performed by: OBSTETRICS & GYNECOLOGY

## 2021-06-16 PROCEDURE — G0463 HOSPITAL OUTPT CLINIC VISIT: HCPCS

## 2021-06-16 ASSESSMENT — PAIN SCALES - GENERAL: PAINLEVEL: NO PAIN (0)

## 2021-06-16 ASSESSMENT — MIFFLIN-ST. JEOR: SCORE: 1633.94

## 2021-06-16 NOTE — NURSING NOTE
"Oncology Rooming Note    June 16, 2021 3:22 PM   Noe Fitzgerald is a 51 year old female who presents for:    Chief Complaint   Patient presents with     Oncology Clinic Visit     COMPLEX ENDOMETRIAL HYPERPLASIA; POST OP     Initial Vitals: /85 (BP Location: Right arm, Patient Position: Sitting, Cuff Size: Adult Large)   Pulse 86   Temp 98.4  F (36.9  C) (Oral)   Resp 16   Ht 1.676 m (5' 5.98\")   Wt 100.7 kg (222 lb 1.6 oz)   SpO2 98%   BMI 35.87 kg/m   Estimated body mass index is 35.87 kg/m  as calculated from the following:    Height as of this encounter: 1.676 m (5' 5.98\").    Weight as of this encounter: 100.7 kg (222 lb 1.6 oz). Body surface area is 2.17 meters squared.  No Pain (0) Comment: Data Unavailable   No LMP recorded.  Allergies reviewed: Yes  Medications reviewed: Yes    Medications: Medication refills not needed today.  Pharmacy name entered into KLab: [id number] - [place], [place] - [facility] [address]     Clinical concerns: No new concerns.        Magda Velásquez CMA              "

## 2021-06-16 NOTE — LETTER
"    6/16/2021         RE: Noe Fitzgerald  [address] [place] [id number]        Dear Colleague,    Thank you for referring your patient, Noe Fitzgerald, to the [facility]. Please see a copy of my visit note below.    Gynecologic Oncology Clinic - Established Patient Visit    Visit date: Jun 16, 2021     CC: post-op    Interval history: Noe Fitzgerald is a 51 year old  who underwent Total laparoscopic hysterectomy, bilateral salpingo-oophorectomy for endometrial hyperplasia with desire for post-menopausal BSO. She is here today for post-op appointment.    She is overall feeling well. She is eating and drinking well. In general she has no significant diarrhea/constipation or bladder concerns. Her pain is minimal at this point. Her incisions are healing well overall.       Review of Systems:  As per HPI, otherwise non-contributory.     Past Surgical History:  Past Surgical History:   Procedure Laterality Date     ESOPHAGOSCOPY, GASTROSCOPY, DUODENOSCOPY (EGD), COMBINED N/A 01/26/2017    (EGD) ESOPHAGOSCOPY, GASTROSCOPY, DUODENOSCOPY (EGD);  Surgeon: Damian Ames MD;  Location: [facility]     FOOT SURGERY      Foot surgery     LAPAROSCOPIC HYSTERECTOMY TOTAL, BILATERAL SALPINGO-OOPHORECTOMY, COMBINED Bilateral 6/3/2021    Procedure: Total laparoscopic hysterectomy bilateral salpingo-oophorectomy;  Surgeon: Robert Lyons MD;  [facility]     LEEP TX, CERVICAL  01/01/1991    LEEP TX Cervical     MAMMOPLASTY REDUCTION BILATERAL Bilateral 05/22/2017    Procedure: MAMMOPLASTY REDUCTION BILATERAL;  Bilateral Breast Reduction Mammoplasty;  Surgeon: Franny Reynolds MD;  [facility]     TYMPANOPLASTY CHILD      5th grader        Physical Exam:  /85 (BP Location: Right arm, Patient Position: Sitting, Cuff Size: Adult Large)   Pulse 86   Temp 98.4  F (36.9  C) (Oral)   Resp 16   Ht 1.676 m (5' 5.98\")   Wt 100.7 kg (222 lb 1.6 oz)   SpO2 98%   BMI 35.87 kg/m  " "   General appearance: no acute distress, well groomed, sitting comfortably   GI: abdomen soft, incision(s) well healed, bleeding from right medial incision after removal of steri strips. Bandage placed.    Pathology:  Lab Results   Component Value Date    PATH  06/03/2021     Patient Name: LISA PRATT  MR#: [id number]  Specimen #: [id number]  Collected: 6/3/2021  Received: 6/3/2021  Reported: 6/7/2021 17:05  Ordering Phy(s): EVI TATE    For improved result formatting, select 'View Enhanced Report Format' under   Linked Documents section.    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    I have personally reviewed all specimens and/or slides, including the   listed special stains, and used them  with my medical judgement to determine or confirm the final diagnosis.    Electronically signed out by:    Rashi Conrad M.D., PhD, [facility]    CLINICAL HISTORY:  51 year oldpre-menopausal female, with endometrial hyperplasia with out   atypia on biopsy.    GROSS:  The specimen is received fresh for frozen section with proper patient   identification, labeled \"uterus, cervix  and bilateral fallopian tubes\". The specimen consists of 188.3 g specimen   measuring 9.4 cm (fundus to  ectocervix), 8.1 cm (cornu to cornu) and 2.3 cm (anteroposteriorly). The   cervical os is ovoid and measures 1.4  cm in diameter. The parametrial and paracervical services are inked black.   " Sectioning reveals multiple simple  cervical cysts with clear contents ranging from 0.3-1.2 cm in greatest   dimension. The endometrial cavity  measures 6.7 x 3.8 cm. No endometrial mass or polyp is identified. The   average endometrial thickness is 0.2  cm. The myometrial thickness ranges from 1.1 cm to 2.6 cm. A 1.3 x 1.2 x   0.9 cm intramural fibroid is  identified in the anterior myometrium.    The right fallopian tube measures 11.5 cm (length) by 1.0 cm (diameter).   There are multiple paratubal cysts  ranging from 0.2-0.7 cm in greatest dimension. Serial sectioning reveals   stellate lumina without any mass or  lesion.    The right ovary measures 2.1 x 2.2 x 1.3 cm. Serial sectioning reveals   multiple hemorrhagic cysts ranging from  0.9-0.6 cm in greatest dimension in the ovarian parenchyma.    The left fallopian tube measures 8.8 cm (length) by 0.7 cm (diameter).   There are multiple paratubal cysts  ranging from 0.2-0.9 cm in greatest dimension. Serial sectioning reveals   stellate lumina without any mass or  lesion.    The left ovary measures 2.2 x 2.6 x 1.5 cm. there is a 1.9 x 1.5 x 1.1 cm   cyst. Serial sectioning reveals a  simple ovarian cyst measuring 0.7 x 0.6 x 0.3 cm filled with clear cell   and serous content.    The entire endometrium is submitted for microscopic examination.   Representative sections from cervix,  bilateral fallopian tubes and bilateral ovaries are submitted for   microscopic examination.    Summary of Sections:  A1FS - endometrium both anterior and posterior (inked black), frozen   section  A2 - A3 - anterior cervix and lower uterine segment, full thickness,   bisected and inked blue  A4 - anterior r endomyometrium, full thickness, bisected and inked blue  A5 - anterior endomyometrium, representative section, with fibroid  A6 - A8 - remaining anterior endometrium, sectioned superior to inferior  A9 - A10 - posterior cervix and lower uterine segment, full thickness,   bisected  and inked blue  A11 - A12 - posterior endomyometrium, full thickness, bisected and inked   blue  A13 - posterior endomyometrium, representative section  A14 - A15 - remaining posterior endometrium, sectioned superior to   inferior  A16 - right fallopian tube, fimbriated end, representative section  A17 - right ovary, representative sections  A18 - left fallopian tube, fimbriated end, representative sections  A19 - left ovary, representative sections  A20 - left ovarian cyst    (Dictated by: KAM Robert, PhD resident 6/4/2021 09:09 AM)    MICROSCOPIC:  Microscopic examination was performed.    The technical component of this testing was completed at the [facility], with the professional component performed   at the [facility], [address] [place] [phone] ([phone])    CPT Codes:  A: 67991-CZ8, 32905-EL    COLLECTION SITE:  Client: [facility]  Location: [facility]    Resident  MARKW           Assessment:  Noe is a 51 year old here for post-op check and path review with atypical complex hyperplasia on final pathology.    Plan:  - We reviewed her pathology. Complex atypical hyperplasia is a cancer pre-cursor. It is treated by hysterectomy, so there is no need for any further follow-up or treatment.  - No further follow-up is necessary in our clinic. She should contact us with any surgery related issues.  - Continue post-op restrictions until 6 weeks after surgery.   - Screening: She does not need any further screening Pap smears given she hasn't had abnormal/high grade Pap smears in >25 years. As such, she doesn't meet criteria for vaginal Pap smears per ASCCP guidelines.  - She should return to her primary provider for preventive care management.    Robert Lyons MD     Gynecologic Oncology

## 2021-06-18 NOTE — PROGRESS NOTES
"Gynecologic Oncology Clinic - Established Patient Visit    Visit date: Jun 16, 2021     CC: post-op    Interval history: Noe Fitzgerald is a 51 year old  who underwent Total laparoscopic hysterectomy, bilateral salpingo-oophorectomy for endometrial hyperplasia with desire for post-menopausal BSO. She is here today for post-op appointment.    She is overall feeling well. She is eating and drinking well. In general she has no significant diarrhea/constipation or bladder concerns. Her pain is minimal at this point. Her incisions are healing well overall.       Review of Systems:  As per HPI, otherwise non-contributory.     Past Surgical History:  Past Surgical History:   Procedure Laterality Date     ESOPHAGOSCOPY, GASTROSCOPY, DUODENOSCOPY (EGD), COMBINED N/A 01/26/2017    (EGD) ESOPHAGOSCOPY, GASTROSCOPY, DUODENOSCOPY (EGD);  Surgeon: Damian Ames MD;  Location: [facility]     FOOT SURGERY      Foot surgery     LAPAROSCOPIC HYSTERECTOMY TOTAL, BILATERAL SALPINGO-OOPHORECTOMY, COMBINED Bilateral 6/3/2021    Procedure: Total laparoscopic hysterectomy bilateral salpingo-oophorectomy;  Surgeon: Robert Lyons MD;  [facility]     LEEP TX, CERVICAL  01/01/1991    LEEP TX Cervical     MAMMOPLASTY REDUCTION BILATERAL Bilateral 05/22/2017    Procedure: MAMMOPLASTY REDUCTION BILATERAL;  Bilateral Breast Reduction Mammoplasty;  Surgeon: Franny Reynolds MD;  [facility]     TYMPANOPLASTY CHILD      7th grader        Physical Exam:  /85 (BP Location: Right arm, Patient Position: Sitting, Cuff Size: Adult Large)   Pulse 86   Temp 98.4  F (36.9  C) (Oral)   Resp 16   Ht 1.676 m (5' 5.98\")   Wt 100.7 kg (222 lb 1.6 oz)   SpO2 98%   BMI 35.87 kg/m     General appearance: no acute distress, well groomed, sitting comfortably   GI: abdomen soft, incision(s) well healed, bleeding from right medial incision after removal of steri strips. Bandage placed.    Pathology:  Lab Results   Component Value Date    " "PATH  06/03/2021     Patient Name: LISA PRATT  MR#: [id number]  Specimen #: [id number]  Collected: 6/3/2021  Received: 6/3/2021  Reported: 6/7/2021 17:05  Ordering Phy(s): EVI TATE    For improved result formatting, select 'View Enhanced Report Format' under   Linked Documents section.    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    I have personally reviewed all specimens and/or slides, including the   listed special stains, and used them  with my medical judgement to determine or confirm the final diagnosis.    Electronically signed out by:    Rashi Conrad M.D., PhD, [facility]    CLINICAL HISTORY:  51 year oldpre-menopausal female, with endometrial hyperplasia with out   atypia on biopsy.    GROSS:  The specimen is received fresh for frozen section with proper patient   identification, labeled \"uterus, cervix  and bilateral fallopian tubes\". The specimen consists of 188.3 g specimen   measuring 9.4 cm (fundus to  ectocervix), 8.1 cm (cornu to cornu) and 2.3 cm (anteroposteriorly). The   cervical os is ovoid and measures 1.4  cm in diameter. The parametrial and paracervical services are inked black.   Sectioning reveals multiple simple  cervical cysts with clear contents ranging from 0.3-1.2 cm in greatest   dimension. The endometrial cavity  measures 6.7 x 3.8 cm. No endometrial mass or polyp is identified. The   average endometrial thickness is 0.2  cm. The " myometrial thickness ranges from 1.1 cm to 2.6 cm. A 1.3 x 1.2 x   0.9 cm intramural fibroid is  identified in the anterior myometrium.    The right fallopian tube measures 11.5 cm (length) by 1.0 cm (diameter).   There are multiple paratubal cysts  ranging from 0.2-0.7 cm in greatest dimension. Serial sectioning reveals   stellate lumina without any mass or  lesion.    The right ovary measures 2.1 x 2.2 x 1.3 cm. Serial sectioning reveals   multiple hemorrhagic cysts ranging from  0.9-0.6 cm in greatest dimension in the ovarian parenchyma.    The left fallopian tube measures 8.8 cm (length) by 0.7 cm (diameter).   There are multiple paratubal cysts  ranging from 0.2-0.9 cm in greatest dimension. Serial sectioning reveals   stellate lumina without any mass or  lesion.    The left ovary measures 2.2 x 2.6 x 1.5 cm. there is a 1.9 x 1.5 x 1.1 cm   cyst. Serial sectioning reveals a  simple ovarian cyst measuring 0.7 x 0.6 x 0.3 cm filled with clear cell   and serous content.    The entire endometrium is submitted for microscopic examination.   Representative sections from cervix,  bilateral fallopian tubes and bilateral ovaries are submitted for   microscopic examination.    Summary of Sections:  A1FS - endometrium both anterior and posterior (inked black), frozen   section  A2 - A3 - anterior cervix and lower uterine segment, full thickness,   bisected and inked blue  A4 - anterior r endomyometrium, full thickness, bisected and inked blue  A5 - anterior endomyometrium, representative section, with fibroid  A6 - A8 - remaining anterior endometrium, sectioned superior to inferior  A9 - A10 - posterior cervix and lower uterine segment, full thickness,   bisected and inked blue  A11 - A12 - posterior endomyometrium, full thickness, bisected and inked   blue  A13 - posterior endomyometrium, representative section  A14 - A15 - remaining posterior endometrium, sectioned superior to   inferior  A16 - right fallopian tube,  fimbriated end, representative section  A17 - right ovary, representative sections  A18 - left fallopian tube, fimbriated end, representative sections  A19 - left ovary, representative sections  A20 - left ovarian cyst    (Dictated by: KAM Robert, PhD resident 6/4/2021 09:09 AM)    MICROSCOPIC:  Microscopic examination was performed.    The technical component of this testing was completed at the [facility], with the professional component performed   at the [facility], [address] [place] [phone] ([phone])    CPT Codes:  A: 00761-NA6, 99321-PP    COLLECTION SITE:  Client: [facility]  Location: [facility] ([facility])    Resident  TEJINDER           Assessment:  Noe is a 51 year old here for post-op check and path review with atypical complex hyperplasia on final pathology.    Plan:  - We reviewed her pathology. Complex atypical hyperplasia is a cancer pre-cursor. It is treated by hysterectomy, so there is no need for any further follow-up or treatment.  - No further follow-up is necessary in our clinic. She should contact us with any surgery related issues.  - Continue post-op restrictions until 6 weeks after surgery.   - Screening: She does not need any further screening Pap smears given she hasn't had abnormal/high grade Pap smears in >25 years. As such, she doesn't meet criteria for vaginal Pap smears per ASCCP guidelines.  - She should return to her primary provider for preventive care management.    Robert Lyons MD     Gynecologic Oncology

## 2021-07-06 ENCOUNTER — MYC MEDICAL ADVICE (OUTPATIENT)
Dept: FAMILY MEDICINE | Facility: CLINIC | Age: 51
End: 2021-07-06

## 2021-09-17 DIAGNOSIS — I10 HYPERTENSION GOAL BP (BLOOD PRESSURE) < 140/90: ICD-10-CM

## 2021-09-18 ENCOUNTER — HEALTH MAINTENANCE LETTER (OUTPATIENT)
Age: 51
End: 2021-09-18

## 2021-09-20 RX ORDER — LISINOPRIL 20 MG/1
TABLET ORAL
Qty: 90 TABLET | Refills: 0 | Status: SHIPPED | OUTPATIENT
Start: 2021-09-20 | End: 2022-06-16

## 2021-12-30 ENCOUNTER — TELEPHONE (OUTPATIENT)
Dept: OBGYN | Facility: CLINIC | Age: 51
End: 2021-12-30

## 2021-12-30 NOTE — TELEPHONE ENCOUNTER
Panel Management Review      Health Maintenance List    Health Maintenance   Topic Date Due     COVID-19 Vaccine (1) Never done     COLORECTAL CANCER SCREENING  Never done     HEPATITIS C SCREENING  Never done     ZOSTER IMMUNIZATION (1 of 2) Never done     DTAP/TDAP/TD IMMUNIZATION (2 - Td or Tdap) 02/25/2021     INFLUENZA VACCINE (1) 09/01/2021     PREVENTIVE CARE VISIT  04/08/2022     HPV TEST  02/03/2025     PAP  02/03/2025     LIPID  04/08/2026     ADVANCE CARE PLANNING  04/08/2026     HIV SCREENING  Completed     PHQ-2  Completed     Pneumococcal Vaccine: Pediatrics (0 to 5 Years) and At-Risk Patients (6 to 64 Years)  Aged Out     IPV IMMUNIZATION  Aged Out     MENINGITIS IMMUNIZATION  Aged Out     HEPATITIS B IMMUNIZATION  Aged Out       Composite cancer screening  Chart review shows that this patient is due/due soon for the following Colonoscopy and Fecal Colorectal (FIT)  Lab Results   Component Value Date    PAP OTHER-NIL, See Result 02/03/2020     Past Surgical History:   Procedure Laterality Date     ESOPHAGOSCOPY, GASTROSCOPY, DUODENOSCOPY (EGD), COMBINED N/A 01/26/2017    (EGD) ESOPHAGOSCOPY, GASTROSCOPY, DUODENOSCOPY (EGD);  Surgeon: Damian Ames MD;  Location: [facility]     FOOT SURGERY      Foot surgery     LAPAROSCOPIC HYSTERECTOMY TOTAL, BILATERAL SALPINGO-OOPHORECTOMY, COMBINED Bilateral 6/3/2021    Procedure: Total laparoscopic hysterectomy bilateral salpingo-oophorectomy;  Surgeon: Robert Lyons MD;  [facility]     LEEP TX, CERVICAL  01/01/1991    LEEP TX Cervical     MAMMOPLASTY REDUCTION BILATERAL Bilateral 05/22/2017    Procedure: MAMMOPLASTY REDUCTION BILATERAL;  Bilateral Breast Reduction Mammoplasty;  Surgeon: Franny Reynolds MD;  [facility]     TYMPANOPLASTY CHILD      5th grader       Is hysterectomy listed in surgical history? No   Is mastectomy listed in surgical history? No     Summary:    Patient is due/failing the following:   Colonoscopy and Fecal Colorectal  (FIT)    Action needed: Patient needs office visit for colon screening.    Type of outreach:  Sent Veam Video message.      Staff Signature:  Aislinn Kahn MA

## 2021-12-30 NOTE — LETTER
2021      Noe Fitzgerald  [address] [id number]        Dear Noe,       To ensure we are providing the best quality care, we have reviewed your chart and see that you are due for:    Colon Cancer Screening:    If you have received a referral to schedule a Colonoscopy or choose to do a Colonoscopy instead of the FIT test, please call [phone] to schedule.    If you have received a FIT test kit from a prior office visit, please check the expiration date. If , please get a new kit from the Lab/ Clinic. If not , please complete the test and mail in as soon as you are able.    You may call Dept: [phone] if you have any questions. If you have completed the tests outside of [facility], please have the results forwarded to our office Fax # [phone]. We will update the chart for your primary Physician to review before your next annual physical.            Sincerely,        Shweta Cadena MD

## 2022-06-16 ENCOUNTER — MYC MEDICAL ADVICE (OUTPATIENT)
Dept: FAMILY MEDICINE | Facility: CLINIC | Age: 52
End: 2022-06-16

## 2022-06-16 DIAGNOSIS — I10 HYPERTENSION GOAL BP (BLOOD PRESSURE) < 140/90: ICD-10-CM

## 2022-06-16 RX ORDER — LISINOPRIL 20 MG/1
20 TABLET ORAL 2 TIMES DAILY
Qty: 180 TABLET | Refills: 0 | Status: SHIPPED | OUTPATIENT
Start: 2022-06-16 | End: 2022-06-21

## 2022-06-17 ENCOUNTER — TELEPHONE (OUTPATIENT)
Dept: FAMILY MEDICINE | Facility: CLINIC | Age: 52
End: 2022-06-17

## 2022-06-17 DIAGNOSIS — I10 HYPERTENSION GOAL BP (BLOOD PRESSURE) < 140/90: ICD-10-CM

## 2022-06-17 NOTE — TELEPHONE ENCOUNTER
Prior Authorization Retail Medication Request    Medication/Dose: Lisinopril 20mg  ICD code (if different than what is on RX):    Previously Tried and Failed:    Rationale:      [employer]  Key: [id number]      Pharmacy Information (if different than what is on RX)  Name:  [facility]  Phone:  [phone]

## 2022-06-20 DIAGNOSIS — I10 HYPERTENSION GOAL BP (BLOOD PRESSURE) < 140/90: ICD-10-CM

## 2022-06-21 RX ORDER — LISINOPRIL 20 MG/1
TABLET ORAL
Qty: 90 TABLET | Refills: 0 | OUTPATIENT
Start: 2022-06-21

## 2022-06-21 RX ORDER — LISINOPRIL 40 MG/1
20 TABLET ORAL 2 TIMES DAILY
Qty: 90 TABLET | Refills: 3 | Status: SHIPPED | OUTPATIENT
Start: 2022-06-21 | End: 2022-12-07

## 2022-06-25 ENCOUNTER — HEALTH MAINTENANCE LETTER (OUTPATIENT)
Age: 52
End: 2022-06-25

## 2022-07-19 ENCOUNTER — TELEPHONE (OUTPATIENT)
Dept: FAMILY MEDICINE | Facility: CLINIC | Age: 52
End: 2022-07-19

## 2022-07-19 DIAGNOSIS — I77.810 ASCENDING AORTA DILATATION (H): ICD-10-CM

## 2022-07-20 NOTE — TELEPHONE ENCOUNTER
Left a message for Noe to return our call. She is due for a follow-up appointment for this request.     Briana Haque RN

## 2022-07-21 RX ORDER — METOPROLOL SUCCINATE 25 MG/1
TABLET, EXTENDED RELEASE ORAL
Qty: 90 TABLET | Refills: 0 | Status: SHIPPED | OUTPATIENT
Start: 2022-07-21 | End: 2022-08-30

## 2022-07-21 NOTE — TELEPHONE ENCOUNTER
Pt says she has been out for 2 days. She made apt for Dr Flores's soonest apt which is August 30  She will need refill to get her to apt. She would like to be able to pick this up today.

## 2022-08-30 ENCOUNTER — OFFICE VISIT (OUTPATIENT)
Dept: FAMILY MEDICINE | Facility: CLINIC | Age: 52
End: 2022-08-30
Payer: COMMERCIAL

## 2022-08-30 VITALS
SYSTOLIC BLOOD PRESSURE: 138 MMHG | HEIGHT: 66 IN | DIASTOLIC BLOOD PRESSURE: 89 MMHG | WEIGHT: 229 LBS | RESPIRATION RATE: 20 BRPM | HEART RATE: 87 BPM | OXYGEN SATURATION: 97 % | BODY MASS INDEX: 36.8 KG/M2 | TEMPERATURE: 98.1 F

## 2022-08-30 DIAGNOSIS — N39.0 ACUTE UTI (URINARY TRACT INFECTION): ICD-10-CM

## 2022-08-30 DIAGNOSIS — R82.90 ABNORMAL URINE ODOR: ICD-10-CM

## 2022-08-30 DIAGNOSIS — I10 HYPERTENSION GOAL BP (BLOOD PRESSURE) < 140/90: Primary | ICD-10-CM

## 2022-08-30 DIAGNOSIS — R19.7 DIARRHEA, UNSPECIFIED TYPE: ICD-10-CM

## 2022-08-30 DIAGNOSIS — Z12.11 SCREEN FOR COLON CANCER: ICD-10-CM

## 2022-08-30 DIAGNOSIS — R73.09 ELEVATED GLUCOSE: ICD-10-CM

## 2022-08-30 DIAGNOSIS — I77.810 AORTIC ROOT DILATION (H): ICD-10-CM

## 2022-08-30 DIAGNOSIS — I77.810 ASCENDING AORTA DILATATION (H): ICD-10-CM

## 2022-08-30 DIAGNOSIS — Z13.6 CARDIOVASCULAR SCREENING; LDL GOAL LESS THAN 160: ICD-10-CM

## 2022-08-30 LAB
ALBUMIN SERPL BCG-MCNC: 4.4 G/DL (ref 3.5–5.2)
ALP SERPL-CCNC: 92 U/L (ref 35–104)
ALT SERPL W P-5'-P-CCNC: 19 U/L (ref 10–35)
ANION GAP SERPL CALCULATED.3IONS-SCNC: 8 MMOL/L (ref 7–15)
AST SERPL W P-5'-P-CCNC: 18 U/L (ref 10–35)
BASOPHILS # BLD AUTO: 0 10E3/UL (ref 0–0.2)
BASOPHILS NFR BLD AUTO: 1 %
BILIRUB SERPL-MCNC: 0.3 MG/DL
BUN SERPL-MCNC: 12.1 MG/DL (ref 6–20)
CALCIUM SERPL-MCNC: 9.3 MG/DL (ref 8.6–10)
CHLORIDE SERPL-SCNC: 103 MMOL/L (ref 98–107)
CHOLEST SERPL-MCNC: 268 MG/DL
CREAT SERPL-MCNC: 1.06 MG/DL (ref 0.51–0.95)
DEPRECATED HCO3 PLAS-SCNC: 27 MMOL/L (ref 22–29)
EOSINOPHIL # BLD AUTO: 0.1 10E3/UL (ref 0–0.7)
EOSINOPHIL NFR BLD AUTO: 2 %
ERYTHROCYTE [DISTWIDTH] IN BLOOD BY AUTOMATED COUNT: 13.2 % (ref 10–15)
GFR SERPL CREATININE-BSD FRML MDRD: 63 ML/MIN/1.73M2
GLUCOSE SERPL-MCNC: 81 MG/DL (ref 70–99)
HBA1C MFR BLD: 5.3 % (ref 0–5.6)
HCT VFR BLD AUTO: 42.1 % (ref 35–47)
HDLC SERPL-MCNC: 43 MG/DL
HGB BLD-MCNC: 14 G/DL (ref 11.7–15.7)
IMM GRANULOCYTES # BLD: 0 10E3/UL
IMM GRANULOCYTES NFR BLD: 0 %
LDLC SERPL CALC-MCNC: 172 MG/DL
LYMPHOCYTES # BLD AUTO: 1.8 10E3/UL (ref 0.8–5.3)
LYMPHOCYTES NFR BLD AUTO: 29 %
MCH RBC QN AUTO: 29.1 PG (ref 26.5–33)
MCHC RBC AUTO-ENTMCNC: 33.3 G/DL (ref 31.5–36.5)
MCV RBC AUTO: 88 FL (ref 78–100)
MONOCYTES # BLD AUTO: 0.4 10E3/UL (ref 0–1.3)
MONOCYTES NFR BLD AUTO: 7 %
NEUTROPHILS # BLD AUTO: 3.7 10E3/UL (ref 1.6–8.3)
NEUTROPHILS NFR BLD AUTO: 61 %
NONHDLC SERPL-MCNC: 225 MG/DL
PLATELET # BLD AUTO: 208 10E3/UL (ref 150–450)
POTASSIUM SERPL-SCNC: 4.3 MMOL/L (ref 3.4–5.3)
PROT SERPL-MCNC: 7.5 G/DL (ref 6.4–8.3)
RBC # BLD AUTO: 4.81 10E6/UL (ref 3.8–5.2)
SODIUM SERPL-SCNC: 138 MMOL/L (ref 136–145)
TRIGL SERPL-MCNC: 265 MG/DL
TSH SERPL DL<=0.005 MIU/L-ACNC: 1.17 UIU/ML (ref 0.3–4.2)
WBC # BLD AUTO: 6.1 10E3/UL (ref 4–11)

## 2022-08-30 PROCEDURE — 83036 HEMOGLOBIN GLYCOSYLATED A1C: CPT | Performed by: FAMILY MEDICINE

## 2022-08-30 PROCEDURE — 36415 COLL VENOUS BLD VENIPUNCTURE: CPT | Performed by: FAMILY MEDICINE

## 2022-08-30 PROCEDURE — 80050 GENERAL HEALTH PANEL: CPT | Performed by: FAMILY MEDICINE

## 2022-08-30 PROCEDURE — 90715 TDAP VACCINE 7 YRS/> IM: CPT | Performed by: FAMILY MEDICINE

## 2022-08-30 PROCEDURE — 80061 LIPID PANEL: CPT | Performed by: FAMILY MEDICINE

## 2022-08-30 PROCEDURE — 90471 IMMUNIZATION ADMIN: CPT | Performed by: FAMILY MEDICINE

## 2022-08-30 PROCEDURE — 99214 OFFICE O/P EST MOD 30 MIN: CPT | Mod: 25 | Performed by: FAMILY MEDICINE

## 2022-08-30 RX ORDER — METOPROLOL SUCCINATE 25 MG/1
25 TABLET, EXTENDED RELEASE ORAL DAILY
Qty: 90 TABLET | Refills: 3 | Status: SHIPPED | OUTPATIENT
Start: 2022-08-30 | End: 2023-11-08

## 2022-08-30 ASSESSMENT — PAIN SCALES - GENERAL: PAINLEVEL: NO PAIN (0)

## 2022-08-30 NOTE — PATIENT INSTRUCTIONS
Echocardiogram     Urine and blood work work today    Stool studies    Imodium 30 min prior to eating if going out    Colonoscopy    Wt Readings from Last 5 Encounters:   08/30/22 103.9 kg (229 lb)   06/16/21 100.7 kg (222 lb 1.6 oz)   06/03/21 100.9 kg (222 lb 7.1 oz)   05/27/21 102.1 kg (225 lb)   05/14/21 102.5 kg (226 lb)

## 2022-08-30 NOTE — PROGRESS NOTES
"  Assessment & Plan     Hypertension goal BP (blood pressure) < 140/90  Well controlled  - metoprolol succinate ER (TOPROL XL) 25 MG 24 hr tablet; Take 1 tablet (25 mg) by mouth daily  - CBC with platelets and differential; Future  - Comprehensive metabolic panel (BMP + Alb, Alk Phos, ALT, AST, Total. Bili, TP); Future  - Comprehensive metabolic panel (BMP + Alb, Alk Phos, ALT, AST, Total. Bili, TP)  - CBC with platelets and differential    Screen for colon cancer  - Colonoscopy Screening  Referral; Future    Ascending aorta dilatation (H)  Recheck echocardiogram   - metoprolol succinate ER (TOPROL XL) 25 MG 24 hr tablet; Take 1 tablet (25 mg) by mouth daily    Diarrhea, unspecified type  Uncertain etiology  Stool studies   Colonoscopy  Imodium  Consider GI consult    - Enteric Bacteria and Virus Panel by ASHLEY Stool; Future  - Ova and Parasite Exam Routine; Future  - TSH with free T4 reflex; Future  - TSH with free T4 reflex  - Enteric Bacteria and Virus Panel by ASHLEY Stool  - Ova and Parasite Exam Routine    Abnormal urine odor  - UA Macro with Reflex to Micro and Culture - lab collect; Future  - UA reflex to Microscopic and Culture; Future    CARDIOVASCULAR SCREENING; LDL GOAL LESS THAN 160  - Lipid panel reflex to direct LDL Fasting; Future  - Lipid panel reflex to direct LDL Fasting    Elevated glucose  - Hemoglobin A1c; Future  - Hemoglobin A1c    Patient Instructions     Echocardiogram     Urine and blood work work today    Stool studies    Imodium 30 min prior to eating if going out    Colonoscopy    Wt Readings from Last 5 Encounters:   08/30/22 103.9 kg (229 lb)   06/16/21 100.7 kg (222 lb 1.6 oz)   06/03/21 100.9 kg (222 lb 7.1 oz)   05/27/21 102.1 kg (225 lb)   05/14/21 102.5 kg (226 lb)                      BMI:   Estimated body mass index is 37.53 kg/m  as calculated from the following:    Height as of this encounter: 1.664 m (5' 5.5\").    Weight as of this encounter: 103.9 kg (229 lb).   Weight " "management plan: Discussed healthy diet and exercise guidelines        Return in about 6 months (around 2/28/2023).    Abril Flores MD  [facility]    Darrius Fitzgerald is a 52 year old, presenting for the following health issues:  Hypertension      History of Present Illness       Reason for visit:  Prescription physical    She eats 0-1 servings of fruits and vegetables daily.She consumes 1 sweetened beverage(s) daily.She exercises with enough effort to increase her heart rate 10 to 19 minutes per day.  She exercises with enough effort to increase her heart rate 3 or less days per week.   She is taking medications regularly.       Hypertension Follow-up      Do you check your blood pressure regularly outside of the clinic? No     Are you following a low salt diet? No    Are your blood pressures ever more than 140 on the top number (systolic) OR more   than 90 on the bottom number (diastolic), for example 140/90? No    On lisinopril, metoprolol.   BP Readings from Last 6 Encounters:   08/30/22 (!) 128/94   06/16/21 132/85   06/03/21 115/73   05/27/21 122/88   05/14/21 132/87   05/07/21 (!) 134/95      No chest pain or shortness of breath     Urine and bowel movements have an odor. Noticed a month. Nothing new in diet or water.   Does have diarrhea, has been years. Am formed stool, after eats has loose stool.   Has frequency and urgency.     Wt Readings from Last 5 Encounters:   08/30/22 103.9 kg (229 lb)   06/16/21 100.7 kg (222 lb 1.6 oz)   06/03/21 100.9 kg (222 lb 7.1 oz)   05/27/21 102.1 kg (225 lb)   05/14/21 102.5 kg (226 lb)        Review of Systems   ROS: 5 point ROS negative except as noted above in HPI, including Gen., Resp., CV, GI &  system review.       Objective    BP (!) 128/94 (BP Location: Right arm)   Pulse 87   Temp 98.1  F (36.7  C) (Tympanic)   Resp 20   Ht 1.664 m (5' 5.5\")   Wt 103.9 kg (229 lb)   LMP 04/05/2021   SpO2 97%   BMI 37.53 kg/m    Body " mass index is 37.53 kg/m .  Physical Exam   GENERAL: healthy, alert and no distress  EYES: Eyes grossly normal to inspection, PERRL and conjunctivae and sclerae normal  HENT: mask in place  NECK: no adenopathy, no asymmetry, masses, or scars and thyroid normal to palpation  RESP: lungs clear to auscultation - no rales, rhonchi or wheezes  CV: regular rate and rhythm, normal S1 S2, no S3 or S4, no murmur, click or rub, no peripheral edema and peripheral pulses strong  ABDOMEN: soft, nontender, no hepatosplenomegaly, no masses and bowel sounds normal  MS: no gross musculoskeletal defects noted, no edema  SKIN: no suspicious lesions or rashes  NEURO: Normal strength and tone, mentation intact and speech normal  PSYCH: mentation appears normal, affect normal/bright              .  ..

## 2022-08-31 ENCOUNTER — LAB (OUTPATIENT)
Dept: LAB | Facility: CLINIC | Age: 52
End: 2022-08-31
Payer: COMMERCIAL

## 2022-08-31 DIAGNOSIS — R82.90 ABNORMAL URINE ODOR: ICD-10-CM

## 2022-08-31 LAB
ALBUMIN UR-MCNC: NEGATIVE MG/DL
APPEARANCE UR: CLEAR
BACTERIA #/AREA URNS HPF: ABNORMAL /HPF
BILIRUB UR QL STRIP: NEGATIVE
C COLI+JEJUNI+LARI FUSA STL QL NAA+PROBE: NOT DETECTED
COLOR UR AUTO: YELLOW
EC STX1 GENE STL QL NAA+PROBE: NOT DETECTED
EC STX2 GENE STL QL NAA+PROBE: NOT DETECTED
GLUCOSE UR STRIP-MCNC: NEGATIVE MG/DL
HGB UR QL STRIP: ABNORMAL
KETONES UR STRIP-MCNC: NEGATIVE MG/DL
LEUKOCYTE ESTERASE UR QL STRIP: ABNORMAL
NITRATE UR QL: NEGATIVE
NOROV GI+II ORF1-ORF2 JNC STL QL NAA+PR: NOT DETECTED
PH UR STRIP: 6.5 [PH] (ref 5–7)
RBC #/AREA URNS AUTO: ABNORMAL /HPF
RVA NSP5 STL QL NAA+PROBE: NOT DETECTED
SALMONELLA SP RPOD STL QL NAA+PROBE: NOT DETECTED
SHIGELLA SP+EIEC IPAH STL QL NAA+PROBE: NOT DETECTED
SP GR UR STRIP: 1.01 (ref 1–1.03)
SQUAMOUS #/AREA URNS AUTO: ABNORMAL /LPF
UROBILINOGEN UR STRIP-ACNC: 0.2 E.U./DL
V CHOL+PARA RFBL+TRKH+TNAA STL QL NAA+PR: NOT DETECTED
WBC #/AREA URNS AUTO: ABNORMAL /HPF
Y ENTERO RECN STL QL NAA+PROBE: NOT DETECTED

## 2022-08-31 PROCEDURE — 87506 IADNA-DNA/RNA PROBE TQ 6-11: CPT

## 2022-08-31 PROCEDURE — 87086 URINE CULTURE/COLONY COUNT: CPT

## 2022-08-31 PROCEDURE — 81001 URINALYSIS AUTO W/SCOPE: CPT

## 2022-08-31 PROCEDURE — 87209 SMEAR COMPLEX STAIN: CPT

## 2022-08-31 PROCEDURE — 87177 OVA AND PARASITES SMEARS: CPT

## 2022-09-01 ENCOUNTER — MYC MEDICAL ADVICE (OUTPATIENT)
Dept: FAMILY MEDICINE | Facility: CLINIC | Age: 52
End: 2022-09-01

## 2022-09-01 LAB — O+P STL MICRO: NEGATIVE

## 2022-09-01 RX ORDER — NITROFURANTOIN 25; 75 MG/1; MG/1
100 CAPSULE ORAL 2 TIMES DAILY
Qty: 10 CAPSULE | Refills: 0 | Status: SHIPPED | OUTPATIENT
Start: 2022-09-01 | End: 2022-09-06

## 2022-09-02 LAB — BACTERIA UR CULT: NORMAL

## 2022-09-12 ENCOUNTER — DOCUMENTATION ONLY (OUTPATIENT)
Dept: FAMILY MEDICINE | Facility: CLINIC | Age: 52
End: 2022-09-12

## 2022-11-20 ENCOUNTER — HEALTH MAINTENANCE LETTER (OUTPATIENT)
Age: 52
End: 2022-11-20

## 2022-11-22 ENCOUNTER — MYC MEDICAL ADVICE (OUTPATIENT)
Dept: FAMILY MEDICINE | Facility: CLINIC | Age: 52
End: 2022-11-22

## 2022-12-06 ENCOUNTER — MYC MEDICAL ADVICE (OUTPATIENT)
Dept: FAMILY MEDICINE | Facility: CLINIC | Age: 52
End: 2022-12-06

## 2022-12-06 DIAGNOSIS — I10 HYPERTENSION GOAL BP (BLOOD PRESSURE) < 140/90: ICD-10-CM

## 2022-12-07 RX ORDER — LISINOPRIL 40 MG/1
20 TABLET ORAL 2 TIMES DAILY
Qty: 7 TABLET | Refills: 0 | Status: SHIPPED | OUTPATIENT
Start: 2022-12-07 | End: 2022-12-14

## 2022-12-07 NOTE — TELEPHONE ENCOUNTER
BP Readings from Last 6 Encounters:   08/30/22 138/89   06/16/21 132/85   06/03/21 115/73   05/27/21 122/88   05/14/21 132/87   05/07/21 (!) 134/95     I talked with Noe.  She is wanting to go back to taking lisinopril 20 mg instead of the current 40 mg.  On 6/16/22 she reported via My Chart that BP was high (156/102)  She says she has made some dietary adjustments and feels better.  She does not check BP at home.  I told her I can not tell her to decrease dose.  She needed refill so I did that at current dose.   She is aware that Dr Bello is out of office this week.   Asking for OK to decrease lisinopril to 20 mg  Yue Copeland RN

## 2022-12-13 RX ORDER — LISINOPRIL 20 MG/1
20 TABLET ORAL DAILY
Status: CANCELLED | OUTPATIENT
Start: 2022-12-13

## 2022-12-14 RX ORDER — LISINOPRIL 20 MG/1
20 TABLET ORAL DAILY
Qty: 90 TABLET | Refills: 3 | Status: SHIPPED | OUTPATIENT
Start: 2022-12-14 | End: 2024-01-04

## 2023-01-31 ENCOUNTER — OFFICE VISIT (OUTPATIENT)
Dept: FAMILY MEDICINE | Facility: CLINIC | Age: 53
End: 2023-01-31
Payer: COMMERCIAL

## 2023-01-31 VITALS
DIASTOLIC BLOOD PRESSURE: 89 MMHG | HEART RATE: 97 BPM | BODY MASS INDEX: 38.65 KG/M2 | TEMPERATURE: 98.7 F | WEIGHT: 232 LBS | SYSTOLIC BLOOD PRESSURE: 128 MMHG | HEIGHT: 65 IN | RESPIRATION RATE: 20 BRPM | OXYGEN SATURATION: 98 %

## 2023-01-31 DIAGNOSIS — R10.2 PELVIC PAIN IN FEMALE: ICD-10-CM

## 2023-01-31 DIAGNOSIS — K52.9 CHRONIC DIARRHEA: Primary | ICD-10-CM

## 2023-01-31 LAB
ALBUMIN UR-MCNC: NEGATIVE MG/DL
APPEARANCE UR: ABNORMAL
BACTERIA #/AREA URNS HPF: ABNORMAL /HPF
BILIRUB UR QL STRIP: NEGATIVE
COLOR UR AUTO: YELLOW
GLUCOSE UR STRIP-MCNC: NEGATIVE MG/DL
HGB UR QL STRIP: ABNORMAL
KETONES UR STRIP-MCNC: NEGATIVE MG/DL
LEUKOCYTE ESTERASE UR QL STRIP: ABNORMAL
NITRATE UR QL: NEGATIVE
PH UR STRIP: 7 [PH] (ref 5–7)
RBC #/AREA URNS AUTO: ABNORMAL /HPF
SP GR UR STRIP: <=1.005 (ref 1–1.03)
SQUAMOUS #/AREA URNS AUTO: ABNORMAL /LPF
UROBILINOGEN UR STRIP-ACNC: 0.2 E.U./DL
WBC #/AREA URNS AUTO: ABNORMAL /HPF

## 2023-01-31 PROCEDURE — 99214 OFFICE O/P EST MOD 30 MIN: CPT | Performed by: FAMILY MEDICINE

## 2023-01-31 PROCEDURE — 81001 URINALYSIS AUTO W/SCOPE: CPT | Performed by: FAMILY MEDICINE

## 2023-01-31 RX ORDER — DIPHENOXYLATE HCL/ATROPINE 2.5-.025MG
1 TABLET ORAL 4 TIMES DAILY PRN
Qty: 30 TABLET | Refills: 1 | Status: SHIPPED | OUTPATIENT
Start: 2023-01-31 | End: 2024-04-02

## 2023-01-31 ASSESSMENT — ENCOUNTER SYMPTOMS: DIARRHEA: 1

## 2023-01-31 ASSESSMENT — PAIN SCALES - GENERAL: PAINLEVEL: NO PAIN (0)

## 2023-01-31 NOTE — PROGRESS NOTES
Assessment & Plan     Chronic diarrhea  Uncertain etiology  - UA Macro with Reflex to Micro and Culture - lab collect; Future  - Adult GI  Referral - Consult Only; Future  - C. difficile Toxin B PCR with reflex to C. difficile Antigen and Toxins A/B EIA; Future  - Adult GI  Referral - Procedure Only; Future  - diphenoxylate-atropine (LOMOTIL) 2.5-0.025 MG tablet; Take 1 tablet by mouth 4 times daily as needed for diarrhea  - CT Abdomen wo & w & Pelvis w Contrast; Future  - C. difficile Toxin B PCR with reflex to C. difficile Antigen and Toxins A/B EIA  - UA Macro with Reflex to Micro and Culture - lab collect  - Urine Microscopic    Pelvic pain in female  Uncertain etiology  - CT Abdomen wo & w & Pelvis w Contrast; Future    Patient Instructions   We'll check for C dif  See [facility]    Colonoscopy if there is a delay    CT abd and pelvis           No follow-ups on file.    Abril Flores MD  [facility]    Darrius Liu is a 53 year old, presenting for the following health issues:  Diarrhea      Diarrhea    History of Present Illness       Reason for visit:  Gastro issues    She eats 2-3 servings of fruits and vegetables daily.She consumes 2 sweetened beverage(s) daily.She exercises with enough effort to increase her heart rate 10 to 19 minutes per day.  She exercises with enough effort to increase her heart rate 4 days per week.   She is taking medications regularly.     Has been off and on since August.   Has a funny smell  Did negative stool studies last year, and UA, but didn't do a colonoscopy.   Has multiple stools a day, depends on what eats, sometimes are formed. Color yellow today, can be brown. No mucus. Has liquid stools. Has had fecal incontinence.   Tried imodium, didn't seem to help.   Has pain in her pelvis, feels that something is wrong.    Review of Systems   Gastrointestinal: Positive for diarrhea.          Objective    /89 (BP Location:  "Right arm)   Pulse 97   Temp 98.7  F (37.1  C) (Tympanic)   Resp 20   Ht 1.651 m (5' 5\")   Wt 105.2 kg (232 lb)   LMP 04/05/2021   SpO2 98%   BMI 38.61 kg/m    Body mass index is 38.61 kg/m .  Physical Exam   GENERAL: healthy, alert and no distress  NECK: no adenopathy, no asymmetry, masses, or scars and thyroid normal to palpation  RESP: lungs clear to auscultation - no rales, rhonchi or wheezes  CV: regular rate and rhythm, normal S1 S2, no S3 or S4, no murmur, click or rub, no peripheral edema   ABDOMEN: soft, nontender, no hepatosplenomegaly, no masses and bowel sounds normal  MS: no gross musculoskeletal defects noted, no edema              "

## 2023-01-31 NOTE — NURSING NOTE
"Chief Complaint   Patient presents with     Diarrhea       Initial /89 (BP Location: Right arm)   Pulse 97   Temp 98.7  F (37.1  C) (Tympanic)   Resp 20   Ht 1.651 m (5' 5\")   Wt 105.2 kg (232 lb)   LMP 04/05/2021   SpO2 98%   BMI 38.61 kg/m   Estimated body mass index is 38.61 kg/m  as calculated from the following:    Height as of this encounter: 1.651 m (5' 5\").    Weight as of this encounter: 105.2 kg (232 lb).    Patient presents to the clinic using No DME    Is there anyone who you would like to be able to receive your results? No  If yes have patient fill out NATACHA    "

## 2023-02-02 LAB — C DIFF TOX B STL QL: NEGATIVE

## 2023-02-02 PROCEDURE — 87493 C DIFF AMPLIFIED PROBE: CPT | Performed by: FAMILY MEDICINE

## 2023-03-27 ENCOUNTER — MYC MEDICAL ADVICE (OUTPATIENT)
Dept: FAMILY MEDICINE | Facility: CLINIC | Age: 53
End: 2023-03-27
Payer: COMMERCIAL

## 2023-03-29 NOTE — TELEPHONE ENCOUNTER
Patient stopped in clinic. Notified that Dr. Harvey will be in clinic on 3/30. Forms printed and placed on providers folder

## 2023-03-30 NOTE — TELEPHONE ENCOUNTER
Pt notified and understood.  [employer] form faxed [phone]  Mailed original to Pt  Dorothea Cox [place] [facility]

## 2023-03-30 NOTE — TELEPHONE ENCOUNTER
Patient calling stating that these forms are due today.     Cathy Sharpe PSC on 3/30/2023 at 9:41 AM

## 2023-05-22 ENCOUNTER — MYC MEDICAL ADVICE (OUTPATIENT)
Dept: FAMILY MEDICINE | Facility: CLINIC | Age: 53
End: 2023-05-22
Payer: COMMERCIAL

## 2023-05-23 ENCOUNTER — OFFICE VISIT (OUTPATIENT)
Dept: FAMILY MEDICINE | Facility: CLINIC | Age: 53
End: 2023-05-23
Payer: COMMERCIAL

## 2023-05-23 VITALS
DIASTOLIC BLOOD PRESSURE: 104 MMHG | TEMPERATURE: 99.6 F | HEIGHT: 66 IN | BODY MASS INDEX: 36.64 KG/M2 | WEIGHT: 228 LBS | SYSTOLIC BLOOD PRESSURE: 130 MMHG | OXYGEN SATURATION: 95 % | HEART RATE: 98 BPM | RESPIRATION RATE: 20 BRPM

## 2023-05-23 DIAGNOSIS — H60.312 ACUTE DIFFUSE OTITIS EXTERNA OF LEFT EAR: Primary | ICD-10-CM

## 2023-05-23 PROCEDURE — 99213 OFFICE O/P EST LOW 20 MIN: CPT | Performed by: FAMILY MEDICINE

## 2023-05-23 RX ORDER — CIPROFLOXACIN AND DEXAMETHASONE 3; 1 MG/ML; MG/ML
4 SUSPENSION/ DROPS AURICULAR (OTIC) 2 TIMES DAILY
Qty: 7.5 ML | Refills: 0 | Status: SHIPPED | OUTPATIENT
Start: 2023-05-23 | End: 2024-04-02

## 2023-05-23 ASSESSMENT — PAIN SCALES - GENERAL: PAINLEVEL: EXTREME PAIN (8)

## 2023-05-23 NOTE — PROGRESS NOTES
"  Assessment & Plan     Acute diffuse otitis externa of left ear  Oral antibiotic and drops  May have a TM rupture, unable to see  May need to see ENT if not improving  - amoxicillin-clavulanate (AUGMENTIN) 875-125 MG tablet; Take 1 tablet by mouth 2 times daily for 7 days  - ciprofloxacin-dexamethasone (CIPRODEX) 0.3-0.1 % otic suspension; Place 4 drops Into the left ear 2 times daily    Patient Instructions   Antibiotic drops for ear  Oral antibiotics    Mychart me in 2-3 days how that is going            BMI:   Estimated body mass index is 37.36 kg/m  as calculated from the following:    Height as of this encounter: 1.664 m (5' 5.5\").    Weight as of this encounter: 103.4 kg (228 lb).           Abril Flores MD  [facility]    Darrius Liu is a 53 year old, presenting for the following health issues:  Ear Problem        5/23/2023     1:01 PM   Additional Questions   Roomed by Adwoa     History of Present Illness       Reason for visit:  Ear pain infection  Symptom onset:  3-7 days ago  Symptoms include:  Drainage,throbbing,pressure,plugged  Symptom intensity:  Severe  Symptom progression:  Worsening  Had these symptoms before:  Yes    She eats 2-3 servings of fruits and vegetables daily.She consumes 2 sweetened beverage(s) daily.She exercises with enough effort to increase her heart rate 20 to 29 minutes per day.  She exercises with enough effort to increase her heart rate 3 or less days per week.   She is taking medications regularly.     Was draining clear fluid last weekend, now more yellow. Last night started with terrible pain, throbbing, pressure. No swimming, no illness.   Had a ruptured eardrum as a kid on the other side.     Review of Systems   No runny nose       Objective    BP (!) 120/94   Pulse 98   Temp 99.6  F (37.6  C) (Tympanic)   Resp 20   Ht 1.664 m (5' 5.5\")   Wt 103.4 kg (228 lb)   LMP 04/04/2021   SpO2 95%   BMI 37.36 kg/m    Body mass index is " 37.36 kg/m .  Physical Exam   GENERAL: healthy, alert and mild distress  HEENT: right ear normal TM and canal, left TM has purulent material, with erythema and edema of the canal  NECK: no adenopathy, no asymmetry, masses, or scars and thyroid normal to palpation  RESP: lungs clear to auscultation - no rales, rhonchi or wheezes  CV: regular rate and rhythm, normal S1 S2, no S3 or S4, no murmur, click or rub

## 2023-05-23 NOTE — TELEPHONE ENCOUNTER
Pt did call back this am. No Openings this am. Did transfer pt to central scheduler to see if there are any openings [facility] or [place].  Pt is aware also that there is UC in [place] that opens at 10 to 8 pm in [facility]

## 2023-05-26 ENCOUNTER — MYC MEDICAL ADVICE (OUTPATIENT)
Dept: GASTROENTEROLOGY | Facility: CLINIC | Age: 53
End: 2023-05-26
Payer: COMMERCIAL

## 2023-05-26 NOTE — TELEPHONE ENCOUNTER
[facility]   [address] [place] [address]    To better serve you, we are sending this letter as a reminder to schedule the following procedure(s) ordered by your physician.   __x___ Colonoscopy   ______ Upper GI Endoscopy (EGD)   ______ Colonoscopy and Upper GI Endoscopy   To provide the highest quality of care, we strongly encourage you to call and schedule the prescribed test/procedure at your earliest convenience. The number to the Specialty Scheduling department is [phone] and the hours are 7:30am - 4:30pm Monday through Friday. If the  is on another line, you may leave a message and your call will be promptly returned.   We look forward to hearing from you.    Sincerely,   [facility]   [facility]

## 2023-06-09 ENCOUNTER — TELEPHONE (OUTPATIENT)
Dept: FAMILY MEDICINE | Facility: CLINIC | Age: 53
End: 2023-06-09
Payer: COMMERCIAL

## 2023-06-09 NOTE — TELEPHONE ENCOUNTER
Patient Quality Outreach    Patient is due for the following:   Colon Cancer Screening    Next Steps:   Colon screening    Type of outreach:    Sent [email or web address] message.      Questions for provider review:    None           Adwoa Jackson, TERRA

## 2023-07-08 ENCOUNTER — HEALTH MAINTENANCE LETTER (OUTPATIENT)
Age: 53
End: 2023-07-08

## 2023-09-26 ENCOUNTER — HOSPITAL ENCOUNTER (OUTPATIENT)
Dept: CT IMAGING | Facility: CLINIC | Age: 53
Discharge: HOME | End: 2023-09-26
Attending: FAMILY MEDICINE | Admitting: FAMILY MEDICINE
Payer: COMMERCIAL

## 2023-09-26 DIAGNOSIS — R10.2 PELVIC PAIN IN FEMALE: ICD-10-CM

## 2023-09-26 DIAGNOSIS — K52.9 CHRONIC DIARRHEA: ICD-10-CM

## 2023-09-26 PROCEDURE — 250N000009 HC RX 250: Performed by: FAMILY MEDICINE

## 2023-09-26 PROCEDURE — 250N000011 HC RX IP 250 OP 636: Performed by: FAMILY MEDICINE

## 2023-09-26 PROCEDURE — 74177 CT ABD & PELVIS W/CONTRAST: CPT

## 2023-09-26 RX ORDER — IOPAMIDOL 755 MG/ML
500 INJECTION, SOLUTION INTRAVASCULAR ONCE
Status: COMPLETED | OUTPATIENT
Start: 2023-09-26 | End: 2023-09-26

## 2023-09-26 RX ADMIN — SODIUM CHLORIDE 60 ML: 9 INJECTION, SOLUTION INTRAVENOUS at 13:12

## 2023-09-26 RX ADMIN — IOPAMIDOL 100 ML: 755 INJECTION, SOLUTION INTRAVENOUS at 13:12

## 2023-11-08 ENCOUNTER — MYC REFILL (OUTPATIENT)
Dept: FAMILY MEDICINE | Facility: CLINIC | Age: 53
End: 2023-11-08
Payer: COMMERCIAL

## 2023-11-08 DIAGNOSIS — I10 HYPERTENSION GOAL BP (BLOOD PRESSURE) < 140/90: ICD-10-CM

## 2023-11-08 DIAGNOSIS — I77.810 ASCENDING AORTA DILATATION (H): ICD-10-CM

## 2023-11-08 RX ORDER — METOPROLOL SUCCINATE 25 MG/1
25 TABLET, EXTENDED RELEASE ORAL DAILY
Qty: 90 TABLET | Refills: 3 | OUTPATIENT
Start: 2023-11-08

## 2023-11-08 RX ORDER — METOPROLOL SUCCINATE 25 MG/1
25 TABLET, EXTENDED RELEASE ORAL DAILY
Qty: 90 TABLET | Refills: 1 | Status: SHIPPED | OUTPATIENT
Start: 2023-11-08 | End: 2024-04-02

## 2023-11-08 NOTE — TELEPHONE ENCOUNTER
"Requested Prescriptions   Pending Prescriptions Disp Refills    metoprolol succinate ER (TOPROL XL) 25 MG 24 hr tablet [Pharmacy Med Name: Metoprolol Succinate ER 25 MG Oral Tablet Extended Release 24 Hour] 90 tablet 0     Sig: Take 1 tablet by mouth once daily       Beta-Blockers Protocol Failed - 11/8/2023  9:01 AM        Failed - Blood pressure under 140/90 in past 12 months     BP Readings from Last 3 Encounters:   05/23/23 (!) 130/104   01/31/23 128/89   08/30/22 138/89                 Passed - Patient is age 6 or older        Passed - Recent (12 mo) or future (30 days) visit within the authorizing provider's specialty     Patient has had an office visit with the authorizing provider or a provider within the authorizing providers department within the previous 12 mos or has a future within next 30 days. See \"Patient Info\" tab in inbasket, or \"Choose Columns\" in Meds & Orders section of the refill encounter.              Passed - Medication is active on med list             "

## 2023-12-29 ENCOUNTER — TELEPHONE (OUTPATIENT)
Dept: FAMILY MEDICINE | Facility: CLINIC | Age: 53
End: 2023-12-29
Payer: COMMERCIAL

## 2023-12-29 NOTE — TELEPHONE ENCOUNTER
Patient Quality Outreach    Patient is due for the following:   Hypertension -  Hypertension follow-up visit  Physical Preventive Adult Physical    Next Steps:   Schedule a Adult Preventative    Type of outreach:    Sent [employer] message.      Questions for provider review:    None           Adwoa Jackson CMA        A/P: 66yMale s/p R patallectomy/quadicepsplasty on 1/7, recovering appropriately    - Pain control  - WBAT RLE in [place]  - Prevena  - DVT ppx: lovenox  - PT/OT/OOB  - FU drain output  - FU OR cx/path A/P: 66yMale s/p R patellectomy/quadicepsplasty on 1/7, recovering appropriately    - Pain control  - WBAT RLE in [place]  - Prevena  - DVT ppx: lovenox  - PT/OT/OOB  - FU drain output  - FU OR cx/path

## 2024-01-04 DIAGNOSIS — I10 HYPERTENSION GOAL BP (BLOOD PRESSURE) < 140/90: ICD-10-CM

## 2024-01-04 RX ORDER — LISINOPRIL 20 MG/1
20 TABLET ORAL DAILY
Qty: 90 TABLET | Refills: 0 | Status: SHIPPED | OUTPATIENT
Start: 2024-01-04 | End: 2024-04-02

## 2024-01-04 RX ORDER — LISINOPRIL 20 MG/1
20 TABLET ORAL DAILY
Qty: 90 TABLET | Refills: 0 | OUTPATIENT
Start: 2024-01-04

## 2024-01-04 NOTE — TELEPHONE ENCOUNTER
Bhargavi refill given x 1, patient has upcoming appointment with Dr. Harvey on 1/25/24.  Prescription approved per [facility] Refill Protocol.  Julie Behrendt RN

## 2024-01-04 NOTE — TELEPHONE ENCOUNTER
Overdue for needed care. Please call to schedule in person visit for blood pressure check and med follow up. Once appt is scheduled, route back to the pool.     Julie Behrendt RN

## 2024-03-14 ENCOUNTER — TELEPHONE (OUTPATIENT)
Dept: FAMILY MEDICINE | Facility: CLINIC | Age: 54
End: 2024-03-14
Payer: COMMERCIAL

## 2024-03-14 NOTE — TELEPHONE ENCOUNTER
Patient Quality Outreach    Patient is due for the following:   Hypertension -  BP check    Next Steps:   Schedule a office visit for blood pressure    Type of outreach:    Sent [employer] message.      Questions for provider review:    None           Adwoa Jackson CMA

## 2024-04-02 ENCOUNTER — OFFICE VISIT (OUTPATIENT)
Dept: FAMILY MEDICINE | Facility: CLINIC | Age: 54
End: 2024-04-02
Payer: COMMERCIAL

## 2024-04-02 VITALS
TEMPERATURE: 97.4 F | HEART RATE: 73 BPM | DIASTOLIC BLOOD PRESSURE: 102 MMHG | RESPIRATION RATE: 20 BRPM | OXYGEN SATURATION: 97 % | SYSTOLIC BLOOD PRESSURE: 152 MMHG | WEIGHT: 247 LBS | HEIGHT: 66 IN | BODY MASS INDEX: 39.7 KG/M2

## 2024-04-02 DIAGNOSIS — Z12.11 SCREEN FOR COLON CANCER: ICD-10-CM

## 2024-04-02 DIAGNOSIS — Z13.6 CARDIOVASCULAR SCREENING; LDL GOAL LESS THAN 160: ICD-10-CM

## 2024-04-02 DIAGNOSIS — H00.015 HORDEOLUM EXTERNUM OF LEFT LOWER EYELID: ICD-10-CM

## 2024-04-02 DIAGNOSIS — I10 HYPERTENSION GOAL BP (BLOOD PRESSURE) < 140/90: Primary | ICD-10-CM

## 2024-04-02 DIAGNOSIS — L82.1 SEBORRHEIC KERATOSES: ICD-10-CM

## 2024-04-02 DIAGNOSIS — I77.810 ASCENDING AORTA DILATATION (H): ICD-10-CM

## 2024-04-02 LAB
ALBUMIN SERPL BCG-MCNC: 4.6 G/DL (ref 3.5–5.2)
ALP SERPL-CCNC: 85 U/L (ref 40–150)
ALT SERPL W P-5'-P-CCNC: 37 U/L (ref 0–50)
ANION GAP SERPL CALCULATED.3IONS-SCNC: 7 MMOL/L (ref 7–15)
AST SERPL W P-5'-P-CCNC: 31 U/L (ref 0–45)
BASOPHILS # BLD AUTO: 0 10E3/UL (ref 0–0.2)
BASOPHILS NFR BLD AUTO: 0 %
BILIRUB SERPL-MCNC: 0.3 MG/DL
BUN SERPL-MCNC: 14.2 MG/DL (ref 6–20)
CALCIUM SERPL-MCNC: 9.6 MG/DL (ref 8.6–10)
CHLORIDE SERPL-SCNC: 104 MMOL/L (ref 98–107)
CHOLEST SERPL-MCNC: 261 MG/DL
CREAT SERPL-MCNC: 0.96 MG/DL (ref 0.51–0.95)
DEPRECATED HCO3 PLAS-SCNC: 29 MMOL/L (ref 22–29)
EGFRCR SERPLBLD CKD-EPI 2021: 70 ML/MIN/1.73M2
EOSINOPHIL # BLD AUTO: 0.2 10E3/UL (ref 0–0.7)
EOSINOPHIL NFR BLD AUTO: 4 %
ERYTHROCYTE [DISTWIDTH] IN BLOOD BY AUTOMATED COUNT: 13 % (ref 10–15)
FASTING STATUS PATIENT QL REPORTED: YES
GLUCOSE SERPL-MCNC: 99 MG/DL (ref 70–99)
HCT VFR BLD AUTO: 43.4 % (ref 35–47)
HDLC SERPL-MCNC: 43 MG/DL
HGB BLD-MCNC: 14.1 G/DL (ref 11.7–15.7)
IMM GRANULOCYTES # BLD: 0 10E3/UL
IMM GRANULOCYTES NFR BLD: 0 %
LDLC SERPL CALC-MCNC: 181 MG/DL
LYMPHOCYTES # BLD AUTO: 2.8 10E3/UL (ref 0.8–5.3)
LYMPHOCYTES NFR BLD AUTO: 44 %
MCH RBC QN AUTO: 29.1 PG (ref 26.5–33)
MCHC RBC AUTO-ENTMCNC: 32.5 G/DL (ref 31.5–36.5)
MCV RBC AUTO: 90 FL (ref 78–100)
MONOCYTES # BLD AUTO: 0.5 10E3/UL (ref 0–1.3)
MONOCYTES NFR BLD AUTO: 7 %
NEUTROPHILS # BLD AUTO: 2.9 10E3/UL (ref 1.6–8.3)
NEUTROPHILS NFR BLD AUTO: 45 %
NONHDLC SERPL-MCNC: 218 MG/DL
PLATELET # BLD AUTO: 231 10E3/UL (ref 150–450)
POTASSIUM SERPL-SCNC: 4.6 MMOL/L (ref 3.4–5.3)
PROT SERPL-MCNC: 7.6 G/DL (ref 6.4–8.3)
RBC # BLD AUTO: 4.84 10E6/UL (ref 3.8–5.2)
SODIUM SERPL-SCNC: 140 MMOL/L (ref 135–145)
TRIGL SERPL-MCNC: 187 MG/DL
WBC # BLD AUTO: 6.4 10E3/UL (ref 4–11)

## 2024-04-02 PROCEDURE — 80061 LIPID PANEL: CPT | Performed by: FAMILY MEDICINE

## 2024-04-02 PROCEDURE — 80053 COMPREHEN METABOLIC PANEL: CPT | Performed by: FAMILY MEDICINE

## 2024-04-02 PROCEDURE — 36415 COLL VENOUS BLD VENIPUNCTURE: CPT | Performed by: FAMILY MEDICINE

## 2024-04-02 PROCEDURE — 85025 COMPLETE CBC W/AUTO DIFF WBC: CPT | Performed by: FAMILY MEDICINE

## 2024-04-02 PROCEDURE — 99214 OFFICE O/P EST MOD 30 MIN: CPT | Performed by: FAMILY MEDICINE

## 2024-04-02 RX ORDER — METOPROLOL SUCCINATE 50 MG/1
25 TABLET, EXTENDED RELEASE ORAL DAILY
Qty: 90 TABLET | Refills: 3 | Status: SHIPPED | OUTPATIENT
Start: 2024-04-02 | End: 2024-04-02

## 2024-04-02 RX ORDER — LISINOPRIL 20 MG/1
20 TABLET ORAL DAILY
Qty: 90 TABLET | Refills: 3 | Status: SHIPPED | OUTPATIENT
Start: 2024-04-02 | End: 2024-04-10

## 2024-04-02 RX ORDER — POLYMYXIN B SULFATE AND TRIMETHOPRIM 1; 10000 MG/ML; [USP'U]/ML
1-2 SOLUTION OPHTHALMIC EVERY 6 HOURS
Qty: 10 ML | Refills: 0 | Status: SHIPPED | OUTPATIENT
Start: 2024-04-02

## 2024-04-02 RX ORDER — CHLORTHALIDONE 25 MG/1
25 TABLET ORAL DAILY
Qty: 90 TABLET | Refills: 0 | Status: SHIPPED | OUTPATIENT
Start: 2024-04-02 | End: 2024-04-10 | Stop reason: SINTOL

## 2024-04-02 RX ORDER — METOPROLOL SUCCINATE 50 MG/1
50 TABLET, EXTENDED RELEASE ORAL DAILY
Qty: 90 TABLET | Refills: 3 | Status: SHIPPED | OUTPATIENT
Start: 2024-04-02

## 2024-04-02 ASSESSMENT — PAIN SCALES - GENERAL: PAINLEVEL: NO PAIN (0)

## 2024-04-02 NOTE — PROGRESS NOTES
"  Assessment & Plan     Hypertension goal BP (blood pressure) < 140/90  Not well controlled  Increase metoprolol to 50 mg and add chlorthalidone  Consider increasing lisinopril as well  - lisinopril (ZESTRIL) 20 MG tablet; Take 1 tablet (20 mg) by mouth daily  - Comprehensive metabolic panel (BMP + Alb, Alk Phos, ALT, AST, Total. Bili, TP); Future  - CBC with platelets and differential; Future  - chlorthalidone (HYGROTON) 25 MG tablet; Take 1 tablet (25 mg) by mouth daily  - metoprolol succinate ER (TOPROL XL) 50 MG 24 hr tablet; Take 1 tablet (50 mg) by mouth daily  - Comprehensive metabolic panel (BMP + Alb, Alk Phos, ALT, AST, Total. Bili, TP)  - CBC with platelets and differential    Ascending aorta dilatation (H24)  Recheck echocardiogram   - metoprolol succinate ER (TOPROL XL) 50 MG 24 hr tablet; Take 1 tablet (50 mg) by mouth daily    Hordeolum externum of left lower eyelid  Antibiotic drops  If not better, see ortho  - polymixin b-trimethoprim (POLYTRIM) 94006-2.1 UNIT/ML-% ophthalmic solution; Place 1-2 drops Into the left eye every 6 hours    CARDIOVASCULAR SCREENING; LDL GOAL LESS THAN 160  - Lipid panel reflex to direct LDL Fasting; Future  - Lipid panel reflex to direct LDL Fasting    Seborrheic keratoses  Reassurance given    Screen for colon cancer  - Colonoscopy Screening  Referral; Future    Patient Instructions   DASH diet     Increase the metoprolol to 50 mg  Add a water pill called chlorthalidone daily    Come back for recheck blood pressure          BMI  Estimated body mass index is 40.48 kg/m  as calculated from the following:    Height as of this encounter: 1.664 m (5' 5.5\").    Weight as of this encounter: 112 kg (247 lb).   DASH diet given        Subjective   Noe is a 54 year old, presenting for the following health issues:  Hypertension and Derm Problem        4/2/2024    10:32 AM   Additional Questions   Roomed by Adwoa     History of Present Illness       Reason for visit:  " "Prescription    She eats 2-3 servings of fruits and vegetables daily.She consumes 1 sweetened beverage(s) daily.She exercises with enough effort to increase her heart rate 10 to 19 minutes per day.  She exercises with enough effort to increase her heart rate 4 days per week.   She is taking medications regularly.         Hypertension Follow-up    Do you check your blood pressure regularly outside of the clinic? No   Are you following a low salt diet? Yes  On lisinopril 20 mg, metoprolol 25 mg  BP Readings from Last 6 Encounters:   04/02/24 (!) 152/102   05/23/23 (!) 130/104   01/31/23 128/89   08/30/22 138/89   06/16/21 132/85   06/03/21 115/73        Stye on left eye x over a month    Has a few skin lesions she would like me to look at      Review of Systems  Constitutional, HEENT, cardiovascular, pulmonary, gi and gu systems are negative, except as otherwise noted.      Objective    BP (!) 152/102 (BP Location: Right arm)   Pulse 73   Temp 97.4  F (36.3  C) (Tympanic)   Resp 20   Ht 1.664 m (5' 5.5\")   Wt 112 kg (247 lb)   LMP 04/04/2021   SpO2 97%   BMI 40.48 kg/m    Body mass index is 40.48 kg/m .  Physical Exam   GENERAL: alert and no distress  EYES: erythematous nodule middle edge lower eyelid  NECK: no adenopathy, no asymmetry, masses, or scars  RESP: lungs clear to auscultation - no rales, rhonchi or wheezes  CV: regular rate and rhythm, normal S1 S2, no S3 or S4, no murmur, click or rub, no peripheral edema  ABDOMEN: soft, nontender, no hepatosplenomegaly, no masses and bowel sounds normal  MS: no gross musculoskeletal defects noted, no edema  Skin: multiple seborrheic keratoses           Signed Electronically by: Abril Flores MD    "

## 2024-04-02 NOTE — PATIENT INSTRUCTIONS
DASH diet     Increase the metoprolol to 50 mg  Add a water pill called chlorthalidone daily    Come back for recheck blood pressure

## 2024-06-28 ENCOUNTER — TELEPHONE (OUTPATIENT)
Dept: FAMILY MEDICINE | Facility: CLINIC | Age: 54
End: 2024-06-28
Payer: COMMERCIAL

## 2024-06-28 DIAGNOSIS — I10 HYPERTENSION GOAL BP (BLOOD PRESSURE) < 140/90: ICD-10-CM

## 2024-06-28 RX ORDER — LISINOPRIL 40 MG/1
20 TABLET ORAL 2 TIMES DAILY
Qty: 90 TABLET | Refills: 1 | Status: SHIPPED | OUTPATIENT
Start: 2024-06-28

## 2024-06-28 NOTE — TELEPHONE ENCOUNTER
Lisinopril 20mg twice daily is not covered (insurance only cover one tablet daily).  Insurance recommends change rx to the 40mg tablet, take 1/2 tab twice daily. Please fax new rx if ok to change.

## 2024-06-28 NOTE — TELEPHONE ENCOUNTER
Pls see telephone encounter below regarding new RX needed per insurance company for lisinopril as they will not pay for BID dosing.     Patient has appointment 8/21/24 to establish care with Dr. Janae Mireles.    RX pended, message routed to provider for consideration.     Julie Behrendt RN

## 2024-06-28 NOTE — TELEPHONE ENCOUNTER
Pt would like to talk to the RN  plEASE CALL HR -[phone]. Pt says she is living [place] [facility] and has a new job that does not allow her to take time off to come to an appointment

## 2024-06-28 NOTE — TELEPHONE ENCOUNTER
Patient needs to establish care with a new provider since Dr. Harvey has retired. Needs a new RX for her lisinopril per her insurance company.    Please assist patient with scheduling care with a new provider, then will route the refill request to new PCP for consideration.     Julie Behrendt RN

## 2024-08-31 ENCOUNTER — HEALTH MAINTENANCE LETTER (OUTPATIENT)
Age: 54
End: 2024-08-31

## 2024-09-30 ENCOUNTER — HOSPITAL ENCOUNTER (OUTPATIENT)
Dept: CARDIOLOGY | Facility: CLINIC | Age: 54
Discharge: HOME | End: 2024-09-30
Attending: FAMILY MEDICINE | Admitting: FAMILY MEDICINE
Payer: COMMERCIAL

## 2024-09-30 DIAGNOSIS — I77.810 ASCENDING AORTA DILATATION (H): ICD-10-CM

## 2024-09-30 LAB — LVEF ECHO: NORMAL

## 2024-09-30 PROCEDURE — 93306 TTE W/DOPPLER COMPLETE: CPT

## 2024-09-30 PROCEDURE — 93306 TTE W/DOPPLER COMPLETE: CPT | Mod: 26 | Performed by: INTERNAL MEDICINE

## 2024-12-23 ENCOUNTER — TELEPHONE (OUTPATIENT)
Dept: FAMILY MEDICINE | Facility: CLINIC | Age: 54
End: 2024-12-23
Payer: COMMERCIAL

## 2024-12-23 ENCOUNTER — E-VISIT (OUTPATIENT)
Dept: FAMILY MEDICINE | Facility: CLINIC | Age: 54
End: 2024-12-23
Payer: COMMERCIAL

## 2024-12-23 DIAGNOSIS — R50.9 FEVER, UNSPECIFIED FEVER CAUSE: Primary | ICD-10-CM

## 2024-12-23 PROCEDURE — 99207 PR NON-BILLABLE SERV PER CHARTING: CPT | Performed by: FAMILY MEDICINE

## 2024-12-23 NOTE — TELEPHONE ENCOUNTER
Patient calling for orders for UA. She no longer has PCP here, and moved away, but here for the day.  States she has a kidney infection but is only in [place] today. She states she has lower back pain, fever, headache and nausea. Advised she should be seen in Urgent Care for evaluation. She refuses to be seen in person because she does not want to wait all day. Requesting E-visit, sent link in [employer] message. Again, re-iterated that e-visits can take 24-48 hours to be answered and she needs to be evaluated in person. She ended call.    Kelly Goins RN on 12/23/2024 at 10:51 AM

## 2024-12-23 NOTE — PATIENT INSTRUCTIONS
Dear Noe Fitzgerald,    We are sorry you are not feeling well. Based on the responses you provided, which included fever and palpitations, it is recommended that you be seen in-person in urgent care so we can better evaluate your symptoms. Please click here to find the nearest urgent care location to you.   You will not be charged for this Visit. Thank you for trusting us with your care.    Janae Mireles MD

## 2025-05-13 DIAGNOSIS — I77.810 ASCENDING AORTA DILATATION: ICD-10-CM

## 2025-05-13 DIAGNOSIS — I10 HYPERTENSION GOAL BP (BLOOD PRESSURE) < 140/90: ICD-10-CM

## 2025-05-13 RX ORDER — METOPROLOL SUCCINATE 50 MG/1
50 TABLET, EXTENDED RELEASE ORAL DAILY
Qty: 90 TABLET | Refills: 0 | OUTPATIENT
Start: 2025-05-13

## 2025-05-20 RX ORDER — LISINOPRIL 20 MG/1
20 TABLET ORAL 2 TIMES DAILY
Qty: 60 TABLET | Refills: 0 | Status: CANCELLED | OUTPATIENT
Start: 2025-05-20

## 2025-05-20 RX ORDER — LISINOPRIL 40 MG/1
20 TABLET ORAL 2 TIMES DAILY
Qty: 15 TABLET | Refills: 0 | Status: SHIPPED | OUTPATIENT
Start: 2025-05-20

## 2025-05-20 RX ORDER — METOPROLOL SUCCINATE 50 MG/1
50 TABLET, EXTENDED RELEASE ORAL DAILY
Qty: 30 TABLET | Refills: 0 | Status: SHIPPED | OUTPATIENT
Start: 2025-05-20

## 2025-05-20 NOTE — TELEPHONE ENCOUNTER
Patient scheduled an annual visit for 6/9/25. Patient is almost out of her medication requesting a bridge refill    Pending Prescriptions:                       Disp   Refills    lisinopril (ZESTRIL) 20 MG tablet         180 ta*3            Sig: Take 1 tablet (20 mg) by mouth 2 times daily.    lisinopril (ZESTRIL) 40 MG tablet         90 tab*1            Sig: Take 0.5 tablets (20 mg) by mouth 2 times daily.    metoprolol succinate ER (TOPROL XL) 50 MG*90 tab*3            Sig: Take 1 tablet (50 mg) by mouth daily.  Refused Prescriptions:                       Disp   Refills    metoprolol succinate ER (TOPROL XL) 50 MG *90 tab*0        Sig: Take 1 tablet by mouth once daily  Refused By: KATALINA COX  Reason for Refusal: Patient needs appointment    Preferred Pharmacy:  [facility] [address] [place], [place] -   [address]   Phone: [phone]    Negin Trivedi/ Patient

## (undated) DEVICE — BLADE KNIFE SURG 10 371110

## (undated) DEVICE — WIPES FOLEY CARE SURESTEP PROVON DFC100

## (undated) DEVICE — SU ETHILON 3-0 PS-1 18" 1663H

## (undated) DEVICE — GLOVE PROTEXIS BLUE W/NEU-THERA 7.0  2D73EB70

## (undated) DEVICE — SU VICRYL 3-0 SH 27" UND J416H

## (undated) DEVICE — ESU PENCIL W/COATED BLADE E2450H

## (undated) DEVICE — SUCTION MANIFOLD NEPTUNE 2 SYS 4 PORT 0702-020-000

## (undated) DEVICE — NDL 25GA 1.5" 305127

## (undated) DEVICE — DRSG KERLIX 4 1/2"X4YDS ROLL 6730

## (undated) DEVICE — SYR 50ML LL W/O NDL 309653

## (undated) DEVICE — KIT PATIENT POSITIONING PIGAZZI LATEX FREE 40580

## (undated) DEVICE — ENDO SCOPE WARMER SEAL  C3101

## (undated) DEVICE — DRSG STERI STRIP 1/2X4" R1547

## (undated) DEVICE — STRAP KNEE/BODY 31143004

## (undated) DEVICE — SU VICRYL 3-0 PS-1 18" UND J683G

## (undated) DEVICE — DRAIN JACKSON PRATT 15FR ROUND SU130-1323

## (undated) DEVICE — GLOVE PROTEXIS MICRO 6.5  2D73PM65

## (undated) DEVICE — SU MONOCRYL 4-0 PS-2 27" UND Y426H

## (undated) DEVICE — SYR BULB IRRIG 50ML LATEX FREE 0035280

## (undated) DEVICE — DRAPE MAYO STAND 23X54 8337

## (undated) DEVICE — STPL SKIN 35W APPOSE 8886803712

## (undated) DEVICE — ESU BLADE PEAK PLASMA 3.0S PS210-030S

## (undated) DEVICE — Device

## (undated) DEVICE — GLOVE PROTEXIS W/NEU-THERA 6.5  2D73TE65

## (undated) DEVICE — APPLICATORS COTTON TIP 6"X2 STERILE LF C15053-006

## (undated) DEVICE — GOWN XLG DISP 9545

## (undated) DEVICE — DRAPE SHEET MED 44X70" 9355

## (undated) DEVICE — KOH COLPOTOMIZER OCCLUDER  CPO-6

## (undated) DEVICE — SYR 10ML LL W/O NDL 302995

## (undated) DEVICE — SU DERMABOND PRINEO 22CM CLR222US

## (undated) DEVICE — PREP SCRUB CARE CHLOROXYLENOL (PCMX) 4OZ 29902-004

## (undated) DEVICE — NDL SPINAL 22GA 3.5" QUINCKE 405181

## (undated) DEVICE — PROTECTOR ARM ONE-STEP TRENDELENBURG 40418

## (undated) DEVICE — SU VICRYL 0 TIE 54" J608H

## (undated) DEVICE — SU PLAIN FAST ABSORB 5-0 PC-1 18" 1915G

## (undated) DEVICE — DRAIN JACKSON PRATT RESERVOIR 100ML SU130-1305

## (undated) DEVICE — ENDO TROCAR SLEEVE KII Z-THREADED 05X100MM CTS02

## (undated) DEVICE — ESU GROUND PAD UNIVERSAL W/O CORD

## (undated) DEVICE — LINEN TOWEL PACK X30 5481

## (undated) DEVICE — LINEN TOWEL PACK X6 WHITE 5487

## (undated) DEVICE — SPECIMEN TRAP MUCOUS 40ML LUKI C30200A

## (undated) DEVICE — NDL INSUFFLATION 13GA 120MM C2201

## (undated) DEVICE — RETR ELEV / UTERINE MANIPULATOR V-CARE MED CUP 60-6085-201A

## (undated) DEVICE — ESU LIGASURE LAPAROSCOPIC BLUNT TIP SEALER 5MMX44CM LF1844

## (undated) DEVICE — SOL WATER IRRIG 1000ML BOTTLE 2F7114

## (undated) DEVICE — SOL ADH LIQUID BENZOIN SWAB 0.6ML C1544

## (undated) DEVICE — SUCTION TIP YANKAUER STR K87

## (undated) DEVICE — LINEN DRAPE 54X72" 5467

## (undated) DEVICE — PREP CHLORAPREP 26ML TINTED ORANGE  260815

## (undated) DEVICE — SUCTION IRR STRYKERFLOW II W/TIP 250-070-520

## (undated) DEVICE — APPLICATOR COTTON TIP 6"X2 STERILE LF 6012

## (undated) DEVICE — ESU PENCIL W/SMOKE EVAC NEPTUNE STRYKER 0703-046-000

## (undated) DEVICE — SU VICRYL 3-0 FS-1 27" J442H

## (undated) DEVICE — ENDO TROCAR FIRST ENTRY KII FIOS Z-THRD 05X100MM CTF03

## (undated) DEVICE — DRSG PRIMAPORE 02X3" 7133

## (undated) DEVICE — ESU ELEC CLEANCOAT LAP L-HOOK 36CM E3773-36C

## (undated) DEVICE — BLADE KNIFE SURG 15 371115

## (undated) DEVICE — SU WND CLOSURE VLOC 180 ABS 0 9" GS-21 VLOCL0346

## (undated) DEVICE — ESU GROUND PAD ADULT W/CORD E7507

## (undated) DEVICE — DRAPE LAP TRANSVERSE 29421

## (undated) DEVICE — SOL NACL 0.9% INJ 1000ML BAG 2B1324X

## (undated) DEVICE — TUBING INSUFFLATION W/FILTER CPC TO LUER 620-030-301

## (undated) DEVICE — SOL NACL 0.9% IRRIG 1000ML BOTTLE 2F7124

## (undated) DEVICE — LIGHT HANDLE X2

## (undated) DEVICE — PEN MARKING SKIN W/LABELS 31145918

## (undated) DEVICE — CATH TRAY FOLEY SURESTEP 16FR WDRAIN BAG STLK LATEX A300316A

## (undated) DEVICE — SYR 10ML FINGER CONTROL W/O NDL 309695

## (undated) DEVICE — SPONGE LAP 18X18" X8435

## (undated) DEVICE — LINEN BACK PACK 5440

## (undated) DEVICE — JELLY LUBRICATING SURGILUBE 2OZ TUBE

## (undated) RX ORDER — HYDROMORPHONE HYDROCHLORIDE 1 MG/ML
INJECTION, SOLUTION INTRAMUSCULAR; INTRAVENOUS; SUBCUTANEOUS
Status: DISPENSED
Start: 2021-06-03

## (undated) RX ORDER — FENTANYL CITRATE 50 UG/ML
INJECTION, SOLUTION INTRAMUSCULAR; INTRAVENOUS
Status: DISPENSED
Start: 2021-06-03

## (undated) RX ORDER — LIDOCAINE HYDROCHLORIDE 20 MG/ML
INJECTION, SOLUTION EPIDURAL; INFILTRATION; INTRACAUDAL; PERINEURAL
Status: DISPENSED
Start: 2021-06-03

## (undated) RX ORDER — CEFAZOLIN SODIUM 1 G/3ML
INJECTION, POWDER, FOR SOLUTION INTRAMUSCULAR; INTRAVENOUS
Status: DISPENSED
Start: 2017-05-22

## (undated) RX ORDER — GLYCOPYRROLATE 0.2 MG/ML
INJECTION, SOLUTION INTRAMUSCULAR; INTRAVENOUS
Status: DISPENSED
Start: 2021-06-03

## (undated) RX ORDER — HEPARIN SODIUM 5000 [USP'U]/.5ML
INJECTION, SOLUTION INTRAVENOUS; SUBCUTANEOUS
Status: DISPENSED
Start: 2021-06-03

## (undated) RX ORDER — DEXAMETHASONE SODIUM PHOSPHATE 4 MG/ML
INJECTION, SOLUTION INTRA-ARTICULAR; INTRALESIONAL; INTRAMUSCULAR; INTRAVENOUS; SOFT TISSUE
Status: DISPENSED
Start: 2021-06-03

## (undated) RX ORDER — IBUPROFEN 600 MG/1
TABLET, FILM COATED ORAL
Status: DISPENSED
Start: 2021-06-03

## (undated) RX ORDER — FAMOTIDINE 20 MG/1
TABLET, FILM COATED ORAL
Status: DISPENSED
Start: 2021-06-03

## (undated) RX ORDER — LIDOCAINE HYDROCHLORIDE 20 MG/ML
INJECTION, SOLUTION EPIDURAL; INFILTRATION; INTRACAUDAL; PERINEURAL
Status: DISPENSED
Start: 2017-05-22

## (undated) RX ORDER — IBUPROFEN 200 MG
TABLET ORAL
Status: DISPENSED
Start: 2021-06-03

## (undated) RX ORDER — ONDANSETRON 2 MG/ML
INJECTION INTRAMUSCULAR; INTRAVENOUS
Status: DISPENSED
Start: 2021-06-03

## (undated) RX ORDER — LIDOCAINE HYDROCHLORIDE 10 MG/ML
INJECTION, SOLUTION EPIDURAL; INFILTRATION; INTRACAUDAL; PERINEURAL
Status: DISPENSED
Start: 2017-01-26

## (undated) RX ORDER — GLYCOPYRROLATE 0.2 MG/ML
INJECTION, SOLUTION INTRAMUSCULAR; INTRAVENOUS
Status: DISPENSED
Start: 2017-05-22

## (undated) RX ORDER — ONDANSETRON 2 MG/ML
INJECTION INTRAMUSCULAR; INTRAVENOUS
Status: DISPENSED
Start: 2017-05-22

## (undated) RX ORDER — PROPOFOL 10 MG/ML
INJECTION, EMULSION INTRAVENOUS
Status: DISPENSED
Start: 2017-05-22

## (undated) RX ORDER — PHENYLEPHRINE HCL IN 0.9% NACL 1 MG/10 ML
SYRINGE (ML) INTRAVENOUS
Status: DISPENSED
Start: 2017-05-22

## (undated) RX ORDER — PROPOFOL 10 MG/ML
INJECTION, EMULSION INTRAVENOUS
Status: DISPENSED
Start: 2021-06-03

## (undated) RX ORDER — OXYCODONE HYDROCHLORIDE 5 MG/1
TABLET ORAL
Status: DISPENSED
Start: 2017-05-22

## (undated) RX ORDER — DEXAMETHASONE SODIUM PHOSPHATE 4 MG/ML
INJECTION, SOLUTION INTRA-ARTICULAR; INTRALESIONAL; INTRAMUSCULAR; INTRAVENOUS; SOFT TISSUE
Status: DISPENSED
Start: 2017-05-22

## (undated) RX ORDER — FENTANYL CITRATE 50 UG/ML
INJECTION, SOLUTION INTRAMUSCULAR; INTRAVENOUS
Status: DISPENSED
Start: 2017-05-22

## (undated) RX ORDER — SCOLOPAMINE TRANSDERMAL SYSTEM 1 MG/1
PATCH, EXTENDED RELEASE TRANSDERMAL
Status: DISPENSED
Start: 2017-05-22

## (undated) RX ORDER — CEFAZOLIN SODIUM 2 G/100ML
INJECTION, SOLUTION INTRAVENOUS
Status: DISPENSED
Start: 2017-05-22

## (undated) RX ORDER — ACETAMINOPHEN 325 MG/1
TABLET ORAL
Status: DISPENSED
Start: 2021-06-03

## (undated) RX ORDER — PROPOFOL 10 MG/ML
VIAL (ML) INTRAVENOUS
Status: DISPENSED
Start: 2017-01-26

## (undated) RX ORDER — CEFAZOLIN SODIUM 2 G/100ML
INJECTION, SOLUTION INTRAVENOUS
Status: DISPENSED
Start: 2021-06-03

## (undated) RX ORDER — GABAPENTIN 300 MG/1
CAPSULE ORAL
Status: DISPENSED
Start: 2021-06-03

## (undated) RX ORDER — NEOSTIGMINE METHYLSULFATE 5 MG/5 ML
SYRINGE (ML) INTRAVENOUS
Status: DISPENSED
Start: 2017-05-22